# Patient Record
Sex: FEMALE | Race: ASIAN | NOT HISPANIC OR LATINO | URBAN - METROPOLITAN AREA
[De-identification: names, ages, dates, MRNs, and addresses within clinical notes are randomized per-mention and may not be internally consistent; named-entity substitution may affect disease eponyms.]

---

## 2018-01-09 ENCOUNTER — INPATIENT (INPATIENT)
Facility: HOSPITAL | Age: 58
LOS: 10 days | Discharge: HOME | End: 2018-01-20
Attending: HOSPITALIST | Admitting: HOSPITALIST
Payer: COMMERCIAL

## 2018-01-09 VITALS
HEIGHT: 62 IN | SYSTOLIC BLOOD PRESSURE: 148 MMHG | RESPIRATION RATE: 22 BRPM | WEIGHT: 160.06 LBS | HEART RATE: 140 BPM | TEMPERATURE: 102 F | DIASTOLIC BLOOD PRESSURE: 82 MMHG | OXYGEN SATURATION: 93 %

## 2018-01-09 DIAGNOSIS — A41.9 SEPSIS, UNSPECIFIED ORGANISM: ICD-10-CM

## 2018-01-09 DIAGNOSIS — F20.89 OTHER SCHIZOPHRENIA: ICD-10-CM

## 2018-01-09 DIAGNOSIS — R06.89 OTHER ABNORMALITIES OF BREATHING: ICD-10-CM

## 2018-01-09 DIAGNOSIS — R74.8 ABNORMAL LEVELS OF OTHER SERUM ENZYMES: ICD-10-CM

## 2018-01-09 DIAGNOSIS — J96.01 ACUTE RESPIRATORY FAILURE WITH HYPOXIA: ICD-10-CM

## 2018-01-09 LAB
ALBUMIN SERPL ELPH-MCNC: 2.9 G/DL — LOW (ref 3.3–5)
ALP SERPL-CCNC: 88 U/L — SIGNIFICANT CHANGE UP (ref 40–120)
ALT FLD-CCNC: 14 U/L — SIGNIFICANT CHANGE UP (ref 12–78)
ANION GAP SERPL CALC-SCNC: 4 MMOL/L — LOW (ref 5–17)
APPEARANCE UR: CLEAR — SIGNIFICANT CHANGE UP
APTT BLD: 29.2 SEC — SIGNIFICANT CHANGE UP (ref 27.5–37.4)
AST SERPL-CCNC: 26 U/L — SIGNIFICANT CHANGE UP (ref 15–37)
BACTERIA # UR AUTO: ABNORMAL
BASE EXCESS BLDA CALC-SCNC: 1.3 MMOL/L — SIGNIFICANT CHANGE UP (ref -2–2)
BASE EXCESS BLDA CALC-SCNC: 1.4 MMOL/L — SIGNIFICANT CHANGE UP (ref -2–2)
BASE EXCESS BLDA CALC-SCNC: 4.6 MMOL/L — HIGH (ref -2–2)
BASOPHILS # BLD AUTO: 0.1 K/UL — SIGNIFICANT CHANGE UP (ref 0–0.2)
BASOPHILS NFR BLD AUTO: 1.3 % — SIGNIFICANT CHANGE UP (ref 0–2)
BILIRUB SERPL-MCNC: 0.5 MG/DL — SIGNIFICANT CHANGE UP (ref 0.2–1.2)
BILIRUB UR-MCNC: NEGATIVE — SIGNIFICANT CHANGE UP
BLOOD GAS COMMENTS: SIGNIFICANT CHANGE UP
BLOOD GAS SOURCE: SIGNIFICANT CHANGE UP
BUN SERPL-MCNC: 19 MG/DL — SIGNIFICANT CHANGE UP (ref 7–23)
CALCIUM SERPL-MCNC: 7.7 MG/DL — LOW (ref 8.5–10.1)
CHLORIDE SERPL-SCNC: 100 MMOL/L — SIGNIFICANT CHANGE UP (ref 96–108)
CK MB BLD-MCNC: 1.7 % — SIGNIFICANT CHANGE UP (ref 0–3.5)
CK MB CFR SERPL CALC: 3.7 NG/ML — HIGH (ref 0.5–3.6)
CK SERPL-CCNC: 212 U/L — HIGH (ref 26–192)
CO2 SERPL-SCNC: 34 MMOL/L — HIGH (ref 22–31)
COLOR SPEC: YELLOW — SIGNIFICANT CHANGE UP
CREAT SERPL-MCNC: 0.81 MG/DL — SIGNIFICANT CHANGE UP (ref 0.5–1.3)
DIFF PNL FLD: ABNORMAL
EOSINOPHIL # BLD AUTO: 0 K/UL — SIGNIFICANT CHANGE UP (ref 0–0.5)
EOSINOPHIL NFR BLD AUTO: 0 % — SIGNIFICANT CHANGE UP (ref 0–6)
EPI CELLS # UR: SIGNIFICANT CHANGE UP
FLUAV SPEC QL CULT: NEGATIVE — SIGNIFICANT CHANGE UP
FLUBV AG SPEC QL IA: NEGATIVE — SIGNIFICANT CHANGE UP
GLUCOSE SERPL-MCNC: 173 MG/DL — HIGH (ref 70–99)
GLUCOSE UR QL: NEGATIVE MG/DL — SIGNIFICANT CHANGE UP
HCO3 BLDA-SCNC: 27 MMOL/L — SIGNIFICANT CHANGE UP (ref 21–29)
HCO3 BLDA-SCNC: 30 MMOL/L — HIGH (ref 21–29)
HCO3 BLDA-SCNC: 33 MMOL/L — HIGH (ref 21–29)
HCT VFR BLD CALC: 37.1 % — SIGNIFICANT CHANGE UP (ref 34.5–45)
HGB BLD-MCNC: 11.6 G/DL — SIGNIFICANT CHANGE UP (ref 11.5–15.5)
HOROWITZ INDEX BLDA+IHG-RTO: 35 — SIGNIFICANT CHANGE UP
HOROWITZ INDEX BLDA+IHG-RTO: 40 — SIGNIFICANT CHANGE UP
HOROWITZ INDEX BLDA+IHG-RTO: 40 — SIGNIFICANT CHANGE UP
INR BLD: 1.3 RATIO — HIGH (ref 0.88–1.16)
KETONES UR-MCNC: NEGATIVE — SIGNIFICANT CHANGE UP
LACTATE SERPL-SCNC: 1.6 MMOL/L — SIGNIFICANT CHANGE UP (ref 0.7–2)
LEUKOCYTE ESTERASE UR-ACNC: NEGATIVE — SIGNIFICANT CHANGE UP
LYMPHOCYTES # BLD AUTO: 1 K/UL — SIGNIFICANT CHANGE UP (ref 1–3.3)
LYMPHOCYTES # BLD AUTO: 11.3 % — LOW (ref 13–44)
MCHC RBC-ENTMCNC: 29.9 PG — SIGNIFICANT CHANGE UP (ref 27–34)
MCHC RBC-ENTMCNC: 31.4 GM/DL — LOW (ref 32–36)
MCV RBC AUTO: 95.4 FL — SIGNIFICANT CHANGE UP (ref 80–100)
MONOCYTES # BLD AUTO: 0.7 K/UL — SIGNIFICANT CHANGE UP (ref 0–0.9)
MONOCYTES NFR BLD AUTO: 7.7 % — SIGNIFICANT CHANGE UP (ref 2–14)
NEUTROPHILS # BLD AUTO: 7.1 K/UL — SIGNIFICANT CHANGE UP (ref 1.8–7.4)
NEUTROPHILS NFR BLD AUTO: 79.6 % — HIGH (ref 43–77)
NITRITE UR-MCNC: NEGATIVE — SIGNIFICANT CHANGE UP
NT-PROBNP SERPL-SCNC: 2330 PG/ML — HIGH (ref 0–125)
PCO2 BLDA: 49 MMHG — HIGH (ref 32–46)
PCO2 BLDA: 74 MMHG — CRITICAL HIGH (ref 32–46)
PCO2 BLDA: 79 MMHG — CRITICAL HIGH (ref 32–46)
PH BLD: 7.23 — LOW (ref 7.35–7.45)
PH BLD: 7.25 — LOW (ref 7.35–7.45)
PH BLD: 7.36 — SIGNIFICANT CHANGE UP (ref 7.35–7.45)
PH UR: 6 — SIGNIFICANT CHANGE UP (ref 5–8)
PLATELET # BLD AUTO: 152 K/UL — SIGNIFICANT CHANGE UP (ref 150–400)
PO2 BLDA: 110 MMHG — HIGH (ref 74–108)
PO2 BLDA: 146 MMHG — HIGH (ref 74–108)
PO2 BLDA: 164 MMHG — HIGH (ref 74–108)
POTASSIUM SERPL-MCNC: 3.5 MMOL/L — SIGNIFICANT CHANGE UP (ref 3.5–5.3)
POTASSIUM SERPL-SCNC: 3.5 MMOL/L — SIGNIFICANT CHANGE UP (ref 3.5–5.3)
PROT SERPL-MCNC: 7.7 GM/DL — SIGNIFICANT CHANGE UP (ref 6–8.3)
PROT UR-MCNC: 30 MG/DL
PROTHROM AB SERPL-ACNC: 14.3 SEC — HIGH (ref 9.8–12.7)
RBC # BLD: 3.89 M/UL — SIGNIFICANT CHANGE UP (ref 3.8–5.2)
RBC # FLD: 14.6 % — SIGNIFICANT CHANGE UP (ref 11–15)
RBC CASTS # UR COMP ASSIST: ABNORMAL /HPF (ref 0–4)
SAO2 % BLDA: 95 % — SIGNIFICANT CHANGE UP (ref 92–96)
SAO2 % BLDA: 97 % — HIGH (ref 92–96)
SAO2 % BLDA: 98 % — HIGH (ref 92–96)
SODIUM SERPL-SCNC: 138 MMOL/L — SIGNIFICANT CHANGE UP (ref 135–145)
SP GR SPEC: 1.02 — SIGNIFICANT CHANGE UP (ref 1.01–1.02)
TROPONIN I SERPL-MCNC: 1.29 NG/ML — HIGH (ref 0.01–0.04)
TROPONIN I SERPL-MCNC: 1.5 NG/ML — HIGH (ref 0.01–0.04)
UROBILINOGEN FLD QL: NEGATIVE MG/DL — SIGNIFICANT CHANGE UP
WBC # BLD: 9 K/UL — SIGNIFICANT CHANGE UP (ref 3.8–10.5)
WBC # FLD AUTO: 9 K/UL — SIGNIFICANT CHANGE UP (ref 3.8–10.5)
WBC UR QL: SIGNIFICANT CHANGE UP

## 2018-01-09 PROCEDURE — 71250 CT THORAX DX C-: CPT | Mod: 26

## 2018-01-09 PROCEDURE — 71045 X-RAY EXAM CHEST 1 VIEW: CPT | Mod: 26

## 2018-01-09 PROCEDURE — 99285 EMERGENCY DEPT VISIT HI MDM: CPT

## 2018-01-09 PROCEDURE — 99223 1ST HOSP IP/OBS HIGH 75: CPT

## 2018-01-09 PROCEDURE — 93010 ELECTROCARDIOGRAM REPORT: CPT

## 2018-01-09 RX ORDER — ARIPIPRAZOLE 15 MG/1
10 TABLET ORAL
Qty: 0 | Refills: 0 | Status: DISCONTINUED | OUTPATIENT
Start: 2018-01-09 | End: 2018-01-20

## 2018-01-09 RX ORDER — ARIPIPRAZOLE 15 MG/1
10 TABLET ORAL
Qty: 0 | Refills: 0 | Status: DISCONTINUED | OUTPATIENT
Start: 2018-01-09 | End: 2018-01-09

## 2018-01-09 RX ORDER — SERTRALINE 25 MG/1
100 TABLET, FILM COATED ORAL
Qty: 0 | Refills: 0 | Status: DISCONTINUED | OUTPATIENT
Start: 2018-01-09 | End: 2018-01-20

## 2018-01-09 RX ORDER — IPRATROPIUM/ALBUTEROL SULFATE 18-103MCG
3 AEROSOL WITH ADAPTER (GRAM) INHALATION
Qty: 0 | Refills: 0 | Status: COMPLETED | OUTPATIENT
Start: 2018-01-09 | End: 2018-01-09

## 2018-01-09 RX ORDER — ENOXAPARIN SODIUM 100 MG/ML
40 INJECTION SUBCUTANEOUS DAILY
Qty: 0 | Refills: 0 | Status: DISCONTINUED | OUTPATIENT
Start: 2018-01-09 | End: 2018-01-20

## 2018-01-09 RX ORDER — ACETAMINOPHEN 500 MG
975 TABLET ORAL ONCE
Qty: 0 | Refills: 0 | Status: COMPLETED | OUTPATIENT
Start: 2018-01-09 | End: 2018-01-09

## 2018-01-09 RX ORDER — PIPERACILLIN AND TAZOBACTAM 4; .5 G/20ML; G/20ML
3.38 INJECTION, POWDER, LYOPHILIZED, FOR SOLUTION INTRAVENOUS EVERY 8 HOURS
Qty: 0 | Refills: 0 | Status: DISCONTINUED | OUTPATIENT
Start: 2018-01-09 | End: 2018-01-13

## 2018-01-09 RX ORDER — DIVALPROEX SODIUM 500 MG/1
500 TABLET, DELAYED RELEASE ORAL
Qty: 0 | Refills: 0 | Status: DISCONTINUED | OUTPATIENT
Start: 2018-01-09 | End: 2018-01-20

## 2018-01-09 RX ORDER — VANCOMYCIN HCL 1 G
1000 VIAL (EA) INTRAVENOUS EVERY 12 HOURS
Qty: 0 | Refills: 0 | Status: DISCONTINUED | OUTPATIENT
Start: 2018-01-09 | End: 2018-01-11

## 2018-01-09 RX ORDER — ASPIRIN/CALCIUM CARB/MAGNESIUM 324 MG
325 TABLET ORAL ONCE
Qty: 0 | Refills: 0 | Status: COMPLETED | OUTPATIENT
Start: 2018-01-09 | End: 2018-01-09

## 2018-01-09 RX ORDER — SODIUM CHLORIDE 9 MG/ML
3 INJECTION INTRAMUSCULAR; INTRAVENOUS; SUBCUTANEOUS ONCE
Qty: 0 | Refills: 0 | Status: COMPLETED | OUTPATIENT
Start: 2018-01-09 | End: 2018-01-09

## 2018-01-09 RX ORDER — AZITHROMYCIN 500 MG/1
TABLET, FILM COATED ORAL
Qty: 0 | Refills: 0 | Status: DISCONTINUED | OUTPATIENT
Start: 2018-01-09 | End: 2018-01-11

## 2018-01-09 RX ORDER — SODIUM CHLORIDE 9 MG/ML
3000 INJECTION INTRAMUSCULAR; INTRAVENOUS; SUBCUTANEOUS ONCE
Qty: 0 | Refills: 0 | Status: COMPLETED | OUTPATIENT
Start: 2018-01-09 | End: 2018-01-09

## 2018-01-09 RX ORDER — VANCOMYCIN HCL 1 G
1000 VIAL (EA) INTRAVENOUS ONCE
Qty: 0 | Refills: 0 | Status: COMPLETED | OUTPATIENT
Start: 2018-01-09 | End: 2018-01-09

## 2018-01-09 RX ORDER — PIPERACILLIN AND TAZOBACTAM 4; .5 G/20ML; G/20ML
3.38 INJECTION, POWDER, LYOPHILIZED, FOR SOLUTION INTRAVENOUS ONCE
Qty: 0 | Refills: 0 | Status: COMPLETED | OUTPATIENT
Start: 2018-01-09 | End: 2018-01-09

## 2018-01-09 RX ORDER — BENZTROPINE MESYLATE 1 MG
2 TABLET ORAL
Qty: 0 | Refills: 0 | Status: DISCONTINUED | OUTPATIENT
Start: 2018-01-09 | End: 2018-01-20

## 2018-01-09 RX ORDER — ASPIRIN/CALCIUM CARB/MAGNESIUM 324 MG
81 TABLET ORAL DAILY
Qty: 0 | Refills: 0 | Status: DISCONTINUED | OUTPATIENT
Start: 2018-01-09 | End: 2018-01-20

## 2018-01-09 RX ORDER — AZITHROMYCIN 500 MG/1
500 TABLET, FILM COATED ORAL ONCE
Qty: 0 | Refills: 0 | Status: COMPLETED | OUTPATIENT
Start: 2018-01-09 | End: 2018-01-09

## 2018-01-09 RX ORDER — AZITHROMYCIN 500 MG/1
500 TABLET, FILM COATED ORAL EVERY 24 HOURS
Qty: 0 | Refills: 0 | Status: DISCONTINUED | OUTPATIENT
Start: 2018-01-10 | End: 2018-01-11

## 2018-01-09 RX ADMIN — SERTRALINE 100 MILLIGRAM(S): 25 TABLET, FILM COATED ORAL at 17:32

## 2018-01-09 RX ADMIN — ARIPIPRAZOLE 10 MILLIGRAM(S): 15 TABLET ORAL at 17:31

## 2018-01-09 RX ADMIN — DIVALPROEX SODIUM 500 MILLIGRAM(S): 500 TABLET, DELAYED RELEASE ORAL at 17:32

## 2018-01-09 RX ADMIN — PIPERACILLIN AND TAZOBACTAM 200 GRAM(S): 4; .5 INJECTION, POWDER, LYOPHILIZED, FOR SOLUTION INTRAVENOUS at 12:24

## 2018-01-09 RX ADMIN — Medication 3 MILLILITER(S): at 12:00

## 2018-01-09 RX ADMIN — SODIUM CHLORIDE 3 MILLILITER(S): 9 INJECTION INTRAMUSCULAR; INTRAVENOUS; SUBCUTANEOUS at 12:16

## 2018-01-09 RX ADMIN — Medication 250 MILLIGRAM(S): at 13:01

## 2018-01-09 RX ADMIN — Medication 975 MILLIGRAM(S): at 12:13

## 2018-01-09 RX ADMIN — Medication 975 MILLIGRAM(S): at 12:56

## 2018-01-09 RX ADMIN — Medication 3 MILLILITER(S): at 11:45

## 2018-01-09 RX ADMIN — Medication 3 MILLILITER(S): at 12:14

## 2018-01-09 RX ADMIN — SODIUM CHLORIDE 3000 MILLILITER(S): 9 INJECTION INTRAMUSCULAR; INTRAVENOUS; SUBCUTANEOUS at 11:45

## 2018-01-09 RX ADMIN — Medication 325 MILLIGRAM(S): at 13:01

## 2018-01-09 RX ADMIN — Medication 2 MILLIGRAM(S): at 17:32

## 2018-01-09 RX ADMIN — PIPERACILLIN AND TAZOBACTAM 25 GRAM(S): 4; .5 INJECTION, POWDER, LYOPHILIZED, FOR SOLUTION INTRAVENOUS at 22:36

## 2018-01-09 RX ADMIN — AZITHROMYCIN 255 MILLIGRAM(S): 500 TABLET, FILM COATED ORAL at 17:12

## 2018-01-09 NOTE — ED ADULT NURSE NOTE - OBJECTIVE STATEMENT
received pt to bed 18 alert and oriented times 4. complaining of persistent cough and fever. code sepsis initiated. labs obtained and sent. fluids started. pt medicated for fever. placed on monitor. respiratory treatment started.

## 2018-01-09 NOTE — H&P ADULT - HISTORY OF PRESENT ILLNESS
56yo female with pmh schizophrenia, h/o chronic lung infection, h/o TB and untreated for a long time with rt lung collapse, presents with 10 days of not feeling well, worsening of chronic cough and sob. Pt at baseline mental status.  Pt lives with sister.  Per family, pt also with chronic diarrhea for years, unchanged. Pt baseline hypoxic.     	ROS: + fever, no chills. No photophobia/eye pain/changes in vision, No ear pain/sore throat/dysphagia, No chest pain/palpitations. + SOB/cough, no stridor. No abdominal pain, N/V/D, no black/bloody bm. No dysuria/frequency/discharge, No headache. No Dizziness.  No rash.  No numbness/tingling/weakness.

## 2018-01-09 NOTE — CONSULT NOTE ADULT - ATTENDING COMMENTS
57 yoF hx schizophrenia, hx TB (chronic R lung collapse/bronchiectasis) admitted with PNA and acute/chronic hypercapnic resp failure. Pt awake and following commands. Is on bipap and now reports that her breathing is significantly improved. Would cont abx for PNA. Cont BD tx. Check sputum cx and bcx

## 2018-01-09 NOTE — H&P ADULT - NSHPREVIEWOFSYSTEMS_GEN_ALL_CORE
CONSTITUTIONAL: POSITIVE fever, no weight loss, or fatigue  EYES: No eye pain, visual disturbances, or discharge  ENMT:  No difficulty hearing, tinnitus, vertigo; No sinus or throat pain  NECK: No pain or stiffness  RESPIRATORY: see history of present illness   CARDIOVASCULAR: No chest pain, palpitations, dizziness, or leg swelling  GASTROINTESTINAL: No abdominal or epigastric pain. No nausea, vomiting, or hematemesis; No diarrhea or constipation. No melena or hematochezia.  GENITOURINARY: No dysuria, frequency, hematuria, or incontinence  NEUROLOGICAL: No headaches, memory loss, loss of strength, numbness, or tremors  SKIN: No itching, burning, rashes, or lesions   LYMPH NODES: No enlarged glands  ENDOCRINE: No heat or cold intolerance; No hair loss  MUSCULOSKELETAL: No joint pain or swelling; No muscle, back, or extremity pain  PSYCHIATRIC: No depression, anxiety, mood swings, or difficulty sleeping  HEME/LYMPH: No easy bruising, or bleeding gums  ALLERGY AND IMMUNOLOGIC: No hives or eczema

## 2018-01-09 NOTE — CONSULT NOTE ADULT - SUBJECTIVE AND OBJECTIVE BOX
Patient is a 57y old  Female who presents with a chief complaint of short of breath (09 Jan 2018 16:16)      HPI:  56yo female with pmh schizophrenia, h/o chronic lung infection, h/o TB and untreated for a long time with rt lung collapse, presents with 10 days of not feeling well, worsening of chronic cough and sob. Pt at baseline mental status.  Pt lives with sister.  Per family, pt also with chronic diarrhea for years, unchanged. Pt baseline hypoxic.     	ROS: + fever, no chills. No photophobia/eye pain/changes in vision, No ear pain/sore throat/dysphagia, No chest pain/palpitations. + SOB/cough, no stridor. No abdominal pain, N/V/D, no black/bloody bm. No dysuria/frequency/discharge, No headache. No Dizziness.  No rash.  No numbness/tingling/weakness. (09 Jan 2018 16:16)    consultation called for abnormal ABG despite bipap use.  Pt has been complaining of worsening SOB for past week with increased sputum production.  Pt also c/o weight loss for past few months and chronic diarrhea.  Pt mentating well and awake/alert      Allergies    No Known Allergies    Intolerances        MEDICATIONS  (STANDING):  ARIPiprazole 10 milliGRAM(s) Oral two times a day  aspirin enteric coated 81 milliGRAM(s) Oral daily  azithromycin  IVPB      benztropine 2 milliGRAM(s) Oral two times a day  diVALproex  milliGRAM(s) Oral two times a day  enoxaparin Injectable 40 milliGRAM(s) SubCutaneous daily  piperacillin/tazobactam IVPB. 3.375 Gram(s) IV Intermittent every 8 hours  sertraline 100 milliGRAM(s) Oral two times a day  vancomycin  IVPB 1000 milliGRAM(s) IV Intermittent every 12 hours    MEDICATIONS  (PRN):      Daily Height in cm: 157.48 (09 Jan 2018 11:34)    Daily     Drug Dosing Weight  Height (cm): 157.48 (09 Jan 2018 11:34)  Weight (kg): 72.6 (09 Jan 2018 11:34)  BMI (kg/m2): 29.3 (09 Jan 2018 11:34)  BSA (m2): 1.74 (09 Jan 2018 11:34)    PAST MEDICAL & SURGICAL HISTORY:  Schizophrenia  Tuberculosis  Lung disease  Autism  Hypoxia  No significant past surgical history      FAMILY HISTORY:  No pertinent family history in first degree relatives      SOCIAL HISTORY:    ADVANCE DIRECTIVES:    REVIEW OF SYSTEMS:    CONSTITUTIONAL: No fever, ++weight loss, or fatigue  RESPIRATORY: +cough,+heezing, ++shortness of breath  CARDIOVASCULAR: No chest pain,  or leg swelling  GASTROINTESTINAL: No abdominal or epigastric pain. No nausea, vomiting,++diarrhea GENITOURINARY: No dysuria, frequency, hematuria, or incontinence       Vital Signs Last 24 Hrs  T(C): 37.2 (09 Jan 2018 13:21), Max: 39.1 (09 Jan 2018 11:34)  T(F): 99 (09 Jan 2018 13:21), Max: 102.3 (09 Jan 2018 11:34)  HR: 67 (09 Jan 2018 20:47) (67 - 140)  BP: 98/61 (09 Jan 2018 20:47) (93/53 - 148/82)  BP(mean): --  ABP: --  ABP(mean): --  RR: 18 (09 Jan 2018 20:37) (18 - 22)  SpO2: 100% (09 Jan 2018 20:37) (93% - 100%)      ABG - ( 09 Jan 2018 20:27 )  pH: x     /  pCO2: 49    /  pO2: 146   / HCO3: 27    / Base Excess: 1.4   /  SaO2: 98                  I&O's Detail      PHYSICAL EXAM:    GENERAL: NAD, well-groomed, well-developed  HEAD:  Atraumatic, Normocephalic  EYES: conjunctiva and sclera clear  NECK: Supple,   NERVOUS SYSTEM:  Alert & Oriented X3, Good concentration; Motor Strength 5/5 B/L upper and lower extremities; DTRs 2+ intact and symmetric  CHEST/LUNG: decreased BS right base and bilat rhonchi  HEART: Regular rate and rhythm; No murmurs,  ABDOMEN: Soft, Nontender, Nondistended; Bowel sounds present  EXTREMITIES:  No clubbing, cyanosis, or edema    LABS:  CBC Full  -  ( 09 Jan 2018 12:12 )  WBC Count : 9.0 K/uL  Hemoglobin : 11.6 g/dL  Hematocrit : 37.1 %  Platelet Count - Automated : 152 K/uL  Mean Cell Volume : 95.4 fl  Mean Cell Hemoglobin : 29.9 pg  Mean Cell Hemoglobin Concentration : 31.4 gm/dL  Auto Neutrophil # : 7.1 K/uL  Auto Lymphocyte # : 1.0 K/uL  Auto Monocyte # : 0.7 K/uL  Auto Eosinophil # : 0.0 K/uL  Auto Basophil # : 0.1 K/uL  Auto Neutrophil % : 79.6 %  Auto Lymphocyte % : 11.3 %  Auto Monocyte % : 7.7 %  Auto Eosinophil % : 0.0 %  Auto Basophil % : 1.3 %    01-09    138  |  100  |  19  ----------------------------<  173<H>  3.5   |  34<H>  |  0.81    Ca    7.7<L>      09 Jan 2018 12:12    TPro  7.7  /  Alb  2.9<L>  /  TBili  0.5  /  DBili  x   /  AST  26  /  ALT  14  /  AlkPhos  88  01-09    CAPILLARY BLOOD GLUCOSE        PT/INR - ( 09 Jan 2018 12:12 )   PT: 14.3 sec;   INR: 1.30 ratio         PTT - ( 09 Jan 2018 12:12 )  PTT:29.2 sec    CARDIAC MARKERS ( 09 Jan 2018 12:12 )  1.500 ng/mL / x     / 212 U/L / x     / 3.7 ng/mL Patient is a 57y old  Female who presents with a chief complaint of short of breath (09 Jan 2018 16:16)      HPI:  58yo female with pmh schizophrenia, h/o chronic lung infection, h/o TB and untreated for a long time with rt lung collapse, presents with 10 days of not feeling well, worsening of chronic cough and sob. Pt at baseline mental status.  Pt lives with sister.  Per family, pt also with chronic diarrhea for years, unchanged. Pt baseline hypoxic.  (09 Jan 2018 16:16)    consultation called for abnormal ABG despite bipap use.  Pt has been complaining of worsening SOB for past week with increased sputum production.  Pt also c/o weight loss for past few months and chronic diarrhea.  Pt mentating well and awake/alert      Allergies    No Known Allergies    Intolerances        MEDICATIONS  (STANDING):  ARIPiprazole 10 milliGRAM(s) Oral two times a day  aspirin enteric coated 81 milliGRAM(s) Oral daily  azithromycin  IVPB      benztropine 2 milliGRAM(s) Oral two times a day  diVALproex  milliGRAM(s) Oral two times a day  enoxaparin Injectable 40 milliGRAM(s) SubCutaneous daily  piperacillin/tazobactam IVPB. 3.375 Gram(s) IV Intermittent every 8 hours  sertraline 100 milliGRAM(s) Oral two times a day  vancomycin  IVPB 1000 milliGRAM(s) IV Intermittent every 12 hours    MEDICATIONS  (PRN):      Daily Height in cm: 157.48 (09 Jan 2018 11:34)    Daily     Drug Dosing Weight  Height (cm): 157.48 (09 Jan 2018 11:34)  Weight (kg): 72.6 (09 Jan 2018 11:34)  BMI (kg/m2): 29.3 (09 Jan 2018 11:34)  BSA (m2): 1.74 (09 Jan 2018 11:34)    PAST MEDICAL & SURGICAL HISTORY:  Schizophrenia  Tuberculosis  Lung disease  Autism  Hypoxia  No significant past surgical history      FAMILY HISTORY:  No pertinent family history in first degree relatives      SOCIAL HISTORY:    ADVANCE DIRECTIVES: FULL code    REVIEW OF SYSTEMS:    CONSTITUTIONAL: No fever, ++weight loss, or fatigue  RESPIRATORY: +cough,+heezing, ++shortness of breath  CARDIOVASCULAR: No chest pain,  or leg swelling  GASTROINTESTINAL: No abdominal or epigastric pain. No nausea, vomiting,++diarrhea GENITOURINARY: No dysuria, frequency, hematuria, or incontinence       Vital Signs Last 24 Hrs  T(C): 37.2 (09 Jan 2018 13:21), Max: 39.1 (09 Jan 2018 11:34)  T(F): 99 (09 Jan 2018 13:21), Max: 102.3 (09 Jan 2018 11:34)  HR: 67 (09 Jan 2018 20:47) (67 - 140)  BP: 98/61 (09 Jan 2018 20:47) (93/53 - 148/82)  BP(mean): --  ABP: --  ABP(mean): --  RR: 18 (09 Jan 2018 20:37) (18 - 22)  SpO2: 100% (09 Jan 2018 20:37) (93% - 100%)      ABG - ( 09 Jan 2018 20:27 )  pH: x     /  pCO2: 49    /  pO2: 146   / HCO3: 27    / Base Excess: 1.4   /  SaO2: 98                  I&O's Detail      PHYSICAL EXAM:    GENERAL: NAD, well-groomed, well-developed  HEAD:  Atraumatic, Normocephalic  EYES: conjunctiva and sclera clear  NECK: Supple,   NERVOUS SYSTEM:  Alert & Oriented X3, Good concentration; Motor Strength 5/5 B/L upper and lower extremities; DTRs 2+ intact and symmetric  CHEST/LUNG: decreased BS right base and bilat rhonchi  HEART: Regular rate and rhythm; No murmurs,  ABDOMEN: Soft, Nontender, Nondistended; Bowel sounds present  EXTREMITIES:  No clubbing, cyanosis, or edema    LABS:  CBC Full  -  ( 09 Jan 2018 12:12 )  WBC Count : 9.0 K/uL  Hemoglobin : 11.6 g/dL  Hematocrit : 37.1 %  Platelet Count - Automated : 152 K/uL  Mean Cell Volume : 95.4 fl  Mean Cell Hemoglobin : 29.9 pg  Mean Cell Hemoglobin Concentration : 31.4 gm/dL  Auto Neutrophil # : 7.1 K/uL  Auto Lymphocyte # : 1.0 K/uL  Auto Monocyte # : 0.7 K/uL  Auto Eosinophil # : 0.0 K/uL  Auto Basophil # : 0.1 K/uL  Auto Neutrophil % : 79.6 %  Auto Lymphocyte % : 11.3 %  Auto Monocyte % : 7.7 %  Auto Eosinophil % : 0.0 %  Auto Basophil % : 1.3 %    01-09    138  |  100  |  19  ----------------------------<  173<H>  3.5   |  34<H>  |  0.81    Ca    7.7<L>      09 Jan 2018 12:12    TPro  7.7  /  Alb  2.9<L>  /  TBili  0.5  /  DBili  x   /  AST  26  /  ALT  14  /  AlkPhos  88  01-09    CAPILLARY BLOOD GLUCOSE        PT/INR - ( 09 Jan 2018 12:12 )   PT: 14.3 sec;   INR: 1.30 ratio         PTT - ( 09 Jan 2018 12:12 )  PTT:29.2 sec    CARDIAC MARKERS ( 09 Jan 2018 12:12 )  1.500 ng/mL / x     / 212 U/L / x     / 3.7 ng/mL

## 2018-01-09 NOTE — ED PROVIDER NOTE - MEDICAL DECISION MAKING DETAILS
Pt hypoxic, but unclear baseline as likely normally hypoxic given history (pt discharged with pulse ox of 88%). Pt also though noted to be hypercpneic but pt is awake and well appearing. Bipap placed. Abx ordered. CT done. PT with elev trop in setting of normal EKG and low CK, though symptoms have been ongoing x 10 days, will need to be trended. Pt hypoxic, but unclear baseline as likely normally hypoxic given history (pt discharged with pulse ox of 88%). Pt also though noted to be hypercpneic but pt is awake and well appearing. Bipap placed. Abx ordered. CT done. PT with elev trop in setting of normal EKG and low CK, though symptoms have been ongoing x 10 days, will need to be trended. ICU consulted, not for ICU at this time. d/w dr. joseph for admission.

## 2018-01-09 NOTE — H&P ADULT - NSHPPHYSICALEXAM_GEN_ALL_CORE
GENERAL:  well-developed mild respiratory distress on bipap   HEAD:  Atraumatic, Normocephalic  EYES: EOMI, PERRLA, conjunctiva and sclera clear  ENMT: No tonsillar erythema, exudates, or enlargement; Moist mucous membranes, Good dentition, No lesions  NECK: Supple, No JVD, Normal thyroid  NERVOUS SYSTEM:  Alert & Oriented X3, Good concentration; Motor Strength 5/5 B/L upper and lower extremities; DTRs 2+ intact and symmetric  CHEST/LUNG: bronchial breath sounds on right -coarse sounds on left   HEART: Regular rate and rhythm; No murmurs, rubs, or gallops  ABDOMEN: Soft, Nontender, Nondistended; Bowel sounds present  EXTREMITIES:  2+ Peripheral Pulses, No clubbing, cyanosis, or edema  LYMPH: No lymphadenopathy   SKIN: No rashes or lesions

## 2018-01-09 NOTE — CONSULT NOTE ADULT - ASSESSMENT
56yo female with pmh schizophrenia, h/o chronic lung infection, h/o TB and untreated for a long time with rt lung collapse, presents with 10 days of not feeling well, worsening of chronic cough and sob.  Pt does not need ICU admission at this time.  She is hemodynamically stable and mentating.  She is protecting her airway.

## 2018-01-09 NOTE — CONSULT NOTE ADULT - SUBJECTIVE AND OBJECTIVE BOX
CHIEF COMPLAINT:  Patient is a 57y old  Female who presents with a chief complaint of short of breath (2018 16:16)      HPI:  56yo female with pmh schizophrenia, h/o chronic lung infection, h/o TB and untreated for a long time with rt lung collapse, presents with 10 days of not feeling well, worsening of chronic cough and sob. Pt at baseline mental status.  Pt lives with sister.  Per family, pt also with chronic diarrhea for years, unchanged. Pt baseline hypoxic.     ROS: + fever, no chills. No photophobia/eye pain/changes in vision, No ear pain/sore throat/dysphagia, No chest pain/palpitations. + SOB/cough, no stridor. No abdominal pain, N/V/D, no black/bloody bm. No dysuria/frequency/discharge, No headache. No Dizziness.  No rash.  No numbness/tingling/weakness. (2018 16:16)    ALLERGIES:  No Known Allergies    Home Medications:  ARIPiprazole 10 mg oral tablet, disintegratin tab(s) orally once a day (2018 12:23)  benztropine 2 mg oral tablet: 1 tab(s) orally 2 times a day (2018 12:23)  divalproex sodium 500 mg oral delayed release tablet: 1 tab(s) orally 2 times a day (2018 12:23)  sertraline 100 mg oral tablet: 1 tab(s) orally once a day (2018 12:23)    PAST MEDICAL & SURGICAL HISTORY:  Schizophrenia  Tuberculosis  Lung disease  Autism  Hypoxia  No significant past surgical history      FAMILY HISTORY:  No pertinent family history in first degree relatives      SOCIAL HISTORY:  No tobacco reported.    REVIEW OF SYSTEMS:  General:  No wt loss, fevers, chills, night sweats; positive fever  Eyes:  Good vision, no reported pain  ENT:  No sore throat, pain, runny nose, dysphagia  CV:  No pain, palpitations, hypo/hypertension  Resp:  No tachypnea, wheezing; positive SOB/cough  GI:  No pain, nausea, vomiting, diarrhea, constipation  :  No pain, bleeding, incontinence, nocturia  Muscle:  No pain, weakness  Breast:  No pain, abscess, mass, discharge  Neuro:  No weakness, tingling, memory problems  Psych:  No fatigue, insomnia, mood problems, depression  Endocrine:  No polyuria, polydipsia, cold/heat intolerance  Heme:  No petechiae, ecchymosis, easy bruisability  Skin:  No rash, tattoos, scars, edema    PHYSICAL EXAM:  Vital Signs:  Vital Signs Last 24 Hrs  T(C): 37.2 (2018 13:21), Max: 39.1 (2018 11:34)  T(F): 99 (2018 13:21), Max: 102.3 (2018 11:34)  HR: 96 (2018 13:21) (96 - 140)  BP: 105/54 (2018 13:21) (105/54 - 148/82)  RR: 20 (2018 13:21) (20 - 22)  SpO2: 100% (2018 12:52) (93% - 100%)  I&O's Summary    General:  Appears stated age, well-groomed, well-nourished, no distress  HEENT:  NC/AT, patent nares w/ pink mucosa, OP clear w/o lesions, conjunctivae clear, no thyromegaly, nodules, adenopathy, no JVD  Chest:  Full & symmetric excursion, no increased effort, breath sounds clear  Cardiovascular:  Regular rhythm, S1, S2, no murmur/rub/S3/S4, radial/pedal pulses 2+  Abdomen:  Soft, non-tender, non-distended, normoactive bowel sounds  Extremities: no edema  Skin:  No rash/erythema. Skin is warm/dry  Musculoskeletal:  N/A  Neuro/Psych:  N/A    LABORATORY:                          11.6   9.0   )-----------( 152      ( 2018 12:12 )             37.1         138  |  100  |  19  ----------------------------<  173<H>  3.5   |  34<H>  |  0.81    Ca    7.7<L>      2018 12:12    TPro  7.7  /  Alb  2.9<L>  /  TBili  0.5  /  DBili  x   /  AST  26  /  ALT  14  /  AlkPhos  88      ABG - ( 2018 14:41 )  pH: x     /  pCO2: 74    /  pO2: 110   / HCO3: 30    / Base Excess: 1.3   /  SaO2: 95            CARDIAC MARKERS ( 2018 12:12 )  1.500 ng/mL / x     / 212 U/L / x     / 3.7 ng/mL      LIVER FUNCTIONS - ( 2018 12:12 )  Alb: 2.9 g/dL / Pro: 7.7 gm/dL / ALK PHOS: 88 U/L / ALT: 14 U/L / AST: 26 U/L / GGT: x           PT/INR - ( 2018 12:12 )   PT: 14.3 sec;   INR: 1.30 ratio         PTT - ( 2018 12:12 )  PTT:29.2 sec    BNPSerum Pro-Brain Natriuretic Peptide: 2330 pg/mL (18 @ 12:12)      IMAGING:  ecg:    < from: Xray Chest 1 View AP/PA. (18 @ 12:18) >  Chronic abnormalities of the right hemithorax, grossly unchanged. Left   lung is clear. CT imaging of the chest recommended.    < end of copied text >    < from: CT Chest No Cont (18 @ 13:40) >  Groundglass and small nodular opacities throughout the left lung, likely   infectious in etiology.    Chronic volume loss of the right lung with cystic bronchiectasis.    < end of copied text >    ASSESSMENT:  56yo female with pmh schizophrenia, h/o chronic lung infection, h/o TB and untreated for a long time with rt lung collapse, presents with 10 days of not feeling well, worsening of chronic cough and sob. Pt at baseline mental status.  Pt lives with sister.  Per family, pt also with chronic diarrhea for years, unchanged. Pt baseline hypoxic.     ROS: + fever, no chills. No photophobia/eye pain/changes in vision, No ear pain/sore throat/dysphagia, No chest pain/palpitations. + SOB/cough, no stridor. No abdominal pain, N/V/D, no black/bloody bm. No dysuria/frequency/discharge, No headache. No Dizziness.  No rash.  No numbness/tingling/weakness.      PLAN:    Bipap/oxygen.    MEDICATIONS  (STANDING):  ARIPiprazole 10 milliGRAM(s) Oral two times a day  aspirin enteric coated 81 milliGRAM(s) Oral daily  azithromycin  IVPB 500 milliGRAM(s) IV Intermittent once  benztropine 2 milliGRAM(s) Oral two times a day  diVALproex  milliGRAM(s) Oral two times a day  enoxaparin Injectable 40 milliGRAM(s) SubCutaneous daily  piperacillin/tazobactam IVPB. 3.375 Gram(s) IV Intermittent every 8 hours  sertraline 100 milliGRAM(s) Oral two times a day  vancomycin  IVPB 1000 milliGRAM(s) IV Intermittent every 12 hours    F/u troponin trend.  Check Echo.  Supportive care.    Abdi Kelly MD, FACC, NATALYA ROSALES, FACP  Director, Heart Failure Services  [facility]  , Department of Cardiology  [facility] CHIEF COMPLAINT:  Patient is a 57y old  Female who presents with a chief complaint of short of breath (2018 16:16)      HPI:  56yo female with pmh schizophrenia, h/o chronic lung infection, h/o TB and untreated for a long time with rt lung collapse, presents with 10 days of not feeling well, worsening of chronic cough and sob. Pt at baseline mental status.  Pt lives with sister.  Per family, pt also with chronic diarrhea for years, unchanged. Pt baseline hypoxic.     ROS: + fever, no chills. No photophobia/eye pain/changes in vision, No ear pain/sore throat/dysphagia, No chest pain/palpitations. + SOB/cough, no stridor. No abdominal pain, N/V/D, no black/bloody bm. No dysuria/frequency/discharge, No headache. No Dizziness.  No rash.  No numbness/tingling/weakness. (2018 16:16)    ALLERGIES:  No Known Allergies    Home Medications:  ARIPiprazole 10 mg oral tablet, disintegratin tab(s) orally once a day (2018 12:23)  benztropine 2 mg oral tablet: 1 tab(s) orally 2 times a day (2018 12:23)  divalproex sodium 500 mg oral delayed release tablet: 1 tab(s) orally 2 times a day (2018 12:23)  sertraline 100 mg oral tablet: 1 tab(s) orally once a day (2018 12:23)    PAST MEDICAL & SURGICAL HISTORY:  Schizophrenia  Tuberculosis  Lung disease  Autism  Hypoxia  No significant past surgical history      FAMILY HISTORY:  No pertinent family history in first degree relatives      SOCIAL HISTORY:  No tobacco reported.    REVIEW OF SYSTEMS:  General:  No wt loss, fevers, chills, night sweats; positive fever  Eyes:  Good vision, no reported pain  ENT:  No sore throat, pain, runny nose, dysphagia  CV:  No pain, palpitations, hypo/hypertension  Resp:  No tachypnea, wheezing; positive SOB/cough  GI:  No pain, nausea, vomiting, diarrhea, constipation  :  No pain, bleeding, incontinence, nocturia  Muscle:  No pain, weakness  Breast:  No pain, abscess, mass, discharge  Neuro:  No weakness, tingling, memory problems  Psych:  No fatigue, insomnia, mood problems, depression  Endocrine:  No polyuria, polydipsia, cold/heat intolerance  Heme:  No petechiae, ecchymosis, easy bruisability  Skin:  No rash, tattoos, scars, edema    PHYSICAL EXAM:  Vital Signs:  Vital Signs Last 24 Hrs  T(C): 37.2 (2018 13:21), Max: 39.1 (2018 11:34)  T(F): 99 (2018 13:21), Max: 102.3 (2018 11:34)  HR: 96 (2018 13:21) (96 - 140)  BP: 105/54 (2018 13:21) (105/54 - 148/82)  RR: 20 (2018 13:21) (20 - 22)  SpO2: 100% (2018 12:52) (93% - 100%)  I&O's Summary    Tele: SR  General:  Appears stated age, well-groomed, well-nourished, no distress  HEENT:  NC/AT, patent nares w/ pink mucosa, OP clear w/o lesions, conjunctivae clear, no thyromegaly, nodules, adenopathy, no JVD  Chest:  Full & symmetric excursion, no increased effort, breath sounds clear  Cardiovascular:  Regular rhythm, S1, S2, no murmur/rub/S3/S4, radial/pedal pulses 2+  Abdomen:  Soft, non-tender, non-distended, normoactive bowel sounds  Extremities: no edema  Skin:  No rash/erythema. Skin is warm/dry  Musculoskeletal:  N/A  Neuro/Psych:  N/A    LABORATORY:                          11.6   9.0   )-----------( 152      ( 2018 12:12 )             37.1         138  |  100  |  19  ----------------------------<  173<H>  3.5   |  34<H>  |  0.81    Ca    7.7<L>      2018 12:12    TPro  7.7  /  Alb  2.9<L>  /  TBili  0.5  /  DBili  x   /  AST  26  /  ALT  14  /  AlkPhos  88      ABG - ( 2018 14:41 )  pH: x     /  pCO2: 74    /  pO2: 110   / HCO3: 30    / Base Excess: 1.3   /  SaO2: 95            CARDIAC MARKERS ( 2018 12:12 )  1.500 ng/mL / x     / 212 U/L / x     / 3.7 ng/mL      LIVER FUNCTIONS - ( 2018 12:12 )  Alb: 2.9 g/dL / Pro: 7.7 gm/dL / ALK PHOS: 88 U/L / ALT: 14 U/L / AST: 26 U/L / GGT: x           PT/INR - ( 2018 12:12 )   PT: 14.3 sec;   INR: 1.30 ratio         PTT - ( 2018 12:12 )  PTT:29.2 sec    BNPSerum Pro-Brain Natriuretic Peptide: 2330 pg/mL (18 @ 12:12)      IMAGING:  ecg: SR, wnL.    < from: Xray Chest 1 View AP/PA. (18 @ 12:18) >  Chronic abnormalities of the right hemithorax, grossly unchanged. Left   lung is clear. CT imaging of the chest recommended.    < end of copied text >    < from: CT Chest No Cont (18 @ 13:40) >  Groundglass and small nodular opacities throughout the left lung, likely   infectious in etiology.    Chronic volume loss of the right lung with cystic bronchiectasis.    < end of copied text >    ASSESSMENT:  56yo female with pmh schizophrenia, h/o chronic lung infection, h/o TB and untreated for a long time with rt lung collapse, presents with 10 days of not feeling well, worsening of chronic cough and sob. Pt at baseline mental status.  Pt lives with sister.  Per family, pt also with chronic diarrhea for years, unchanged. Pt baseline hypoxic.     ROS: + fever, no chills. No photophobia/eye pain/changes in vision, No ear pain/sore throat/dysphagia, No chest pain/palpitations. + SOB/cough, no stridor. No abdominal pain, N/V/D, no black/bloody bm. No dysuria/frequency/discharge, No headache. No Dizziness.  No rash.  No numbness/tingling/weakness.    Likely ischemic demand troponin release and no clear evidence of ACS at present.    PLAN:    Bipap/oxygen.    MEDICATIONS  (STANDING):  ARIPiprazole 10 milliGRAM(s) Oral two times a day  aspirin enteric coated 81 milliGRAM(s) Oral daily  azithromycin  IVPB 500 milliGRAM(s) IV Intermittent once  benztropine 2 milliGRAM(s) Oral two times a day  diVALproex  milliGRAM(s) Oral two times a day  enoxaparin Injectable 40 milliGRAM(s) SubCutaneous daily  piperacillin/tazobactam IVPB. 3.375 Gram(s) IV Intermittent every 8 hours  sertraline 100 milliGRAM(s) Oral two times a day  vancomycin  IVPB 1000 milliGRAM(s) IV Intermittent every 12 hours    F/u troponin trend.  Check Echo.  Supportive care. d/w pt's family at bedside.    Abdi Kelly MD, FACC, FASSHERYL, NATALYA, FACP  Director, Heart Failure Services  [facility]  , Department of Cardiology  [facility]

## 2018-01-09 NOTE — H&P ADULT - ASSESSMENT
59f with history of schizo phrenia and chronic right lung collapse presents with fever and short of breath on bipap     IMPROVE VTE Individual Risk Assessment          RISK                                                          Points    [  ] Previous VTE                                                3    [  ] Thrombophilia                                             2    [  ] Lower limb paralysis                                    2        (unable to hold up >15 seconds)      [  ] Current Cancer                                             2         (within 6 months)    [  x] Immobilization > 24 hrs                              1    [  ] ICU/CCU stay > 24 hours                            1    [  ] Age > 60                                                    1    IMPROVE VTE Score __1_______

## 2018-01-09 NOTE — H&P ADULT - NSHPLABSRESULTS_GEN_ALL_CORE
11.6   9.0   )-----------( 152      ( 09 Jan 2018 12:12 )             37.1   01-09    138  |  100  |  19  ----------------------------<  173<H>  3.5   |  34<H>  |  0.81    Ca    7.7<L>      09 Jan 2018 12:12    TPro  7.7  /  Alb  2.9<L>  /  TBili  0.5  /  DBili  x   /  AST  26  /  ALT  14  /  AlkPhos  88  01-09  < from: CT Chest No Cont (01.09.18 @ 13:40) >    Groundglass and small nodular opacities throughout the left lung, likely   infectious in etiology.    < from: Xray Chest 1 View AP/PA. (01.09.18 @ 12:18) >    Chronic abnormalities of the right hemithorax, grossly unchanged. Left   lung is clear. CT imaging of the chest recommended.

## 2018-01-09 NOTE — ED PROVIDER NOTE - PHYSICAL EXAMINATION
Gen: Alert, Well appearing. NAD    Head: NC, AT, PERRL, EOMI, normal lids/conjunctiva   ENT: Bilateral TM WNL, normal hearing, patent oropharynx without erythema/exudate, uvula midline  Neck: supple, no tenderness/meningismus/JVD   Pulm: diffuse wheeze  CV: + tachy, no M/R/G, +dist pulses   Abd: soft, NT/ND, +BS, no guarding/rebound tenderness  Mskel: no edema/erythema/cyanosis   Skin: no rash   Neuro: Alert, no sensory/motor deficits, CN 2-12 intact Gen: Alert, Well appearing. NAD    Head: NC, AT, PERRL, EOMI, normal lids/conjunctiva   ENT: Bilateral TM WNL, normal hearing, patent oropharynx without erythema/exudate, uvula midline  Neck: supple, no tenderness/meningismus/JVD   Pulm: dec ae on rt, lt diffuse wheeze  CV: + tachy, no M/R/G, +dist pulses   Abd: soft, NT/ND, +BS, no guarding/rebound tenderness  Mskel: no edema/erythema/cyanosis   Skin: no rash   Neuro: Alert, no sensory/motor deficits, CN 2-12 intact

## 2018-01-09 NOTE — ED PROVIDER NOTE - OBJECTIVE STATEMENT
56yo female with pmh schizophrenia, h/o chronic lung infection, h/o TB and ? pthx with surgery presents with 10 days of not feeling well, worsening of chronic cough and sob. Pt at baseline mental status.  Pt lives with sister.  Per family, pt also with chronic diarrhea for years, unchanged.    ROS: + fever, no chills. No photophobia/eye pain/changes in vision, No ear pain/sore throat/dysphagia, No chest pain/palpitations. + SOB/cough, no stridor. No abdominal pain, N/V/D, no black/bloody bm. No dysuria/frequency/discharge, No headache. No Dizziness.  No rash.  No numbness/tingling/weakness. 56yo female with pmh schizophrenia, h/o chronic lung infection, h/o TB and untreated for a long time with rt lung collapse, presents with 10 days of not feeling well, worsening of chronic cough and sob. Pt at baseline mental status.  Pt lives with sister.  Per family, pt also with chronic diarrhea for years, unchanged. Pt baseline hypoxic.     ROS: + fever, no chills. No photophobia/eye pain/changes in vision, No ear pain/sore throat/dysphagia, No chest pain/palpitations. + SOB/cough, no stridor. No abdominal pain, N/V/D, no black/bloody bm. No dysuria/frequency/discharge, No headache. No Dizziness.  No rash.  No numbness/tingling/weakness.

## 2018-01-09 NOTE — ED PROVIDER NOTE - CARE PLAN
Principal Discharge DX:	Sepsis Principal Discharge DX:	Sepsis  Secondary Diagnosis:	Pneumonia Principal Discharge DX:	Sepsis  Secondary Diagnosis:	Pneumonia  Secondary Diagnosis:	Hypercapnemia

## 2018-01-09 NOTE — CONSULT NOTE ADULT - PROBLEM SELECTOR RECOMMENDATION 9
acute respiratory failure due to hypercapnia.  Would continue bipap at adjusted settings of 12/6/40% and repeat ABG.  Treat for possibility of infection with BS iv abx. check procalcitonin and pancultures.  check urine legionella,

## 2018-01-10 DIAGNOSIS — R74.8 ABNORMAL LEVELS OF OTHER SERUM ENZYMES: ICD-10-CM

## 2018-01-10 DIAGNOSIS — J96.02 ACUTE RESPIRATORY FAILURE WITH HYPERCAPNIA: ICD-10-CM

## 2018-01-10 LAB
ANION GAP SERPL CALC-SCNC: 6 MMOL/L — SIGNIFICANT CHANGE UP (ref 5–17)
BASE EXCESS BLDA CALC-SCNC: 5 MMOL/L — HIGH (ref -2–2)
BLOOD GAS COMMENTS: SIGNIFICANT CHANGE UP
BLOOD GAS COMMENTS: SIGNIFICANT CHANGE UP
BLOOD GAS SOURCE: SIGNIFICANT CHANGE UP
BUN SERPL-MCNC: 14 MG/DL — SIGNIFICANT CHANGE UP (ref 7–23)
CALCIUM SERPL-MCNC: 8 MG/DL — LOW (ref 8.5–10.1)
CHLORIDE SERPL-SCNC: 102 MMOL/L — SIGNIFICANT CHANGE UP (ref 96–108)
CO2 SERPL-SCNC: 33 MMOL/L — HIGH (ref 22–31)
CREAT SERPL-MCNC: 0.58 MG/DL — SIGNIFICANT CHANGE UP (ref 0.5–1.3)
GLUCOSE SERPL-MCNC: 98 MG/DL — SIGNIFICANT CHANGE UP (ref 70–99)
HCO3 BLDA-SCNC: 34 MMOL/L — HIGH (ref 21–29)
HCT VFR BLD CALC: 38.1 % — SIGNIFICANT CHANGE UP (ref 34.5–45)
HGB BLD-MCNC: 11.9 G/DL — SIGNIFICANT CHANGE UP (ref 11.5–15.5)
HOROWITZ INDEX BLDA+IHG-RTO: 35 — SIGNIFICANT CHANGE UP
MCHC RBC-ENTMCNC: 30.5 PG — SIGNIFICANT CHANGE UP (ref 27–34)
MCHC RBC-ENTMCNC: 31.3 GM/DL — LOW (ref 32–36)
MCV RBC AUTO: 97.3 FL — SIGNIFICANT CHANGE UP (ref 80–100)
PCO2 BLDA: 77 MMHG — CRITICAL HIGH (ref 32–46)
PH BLD: 7.26 — LOW (ref 7.35–7.45)
PLATELET # BLD AUTO: 122 K/UL — LOW (ref 150–400)
PO2 BLDA: 90 MMHG — SIGNIFICANT CHANGE UP (ref 74–108)
POTASSIUM SERPL-MCNC: 4.4 MMOL/L — SIGNIFICANT CHANGE UP (ref 3.5–5.3)
POTASSIUM SERPL-SCNC: 4.4 MMOL/L — SIGNIFICANT CHANGE UP (ref 3.5–5.3)
RBC # BLD: 3.92 M/UL — SIGNIFICANT CHANGE UP (ref 3.8–5.2)
RBC # FLD: 15 % — SIGNIFICANT CHANGE UP (ref 11–15)
SAO2 % BLDA: 95 % — SIGNIFICANT CHANGE UP (ref 92–96)
SODIUM SERPL-SCNC: 141 MMOL/L — SIGNIFICANT CHANGE UP (ref 135–145)
TROPONIN I SERPL-MCNC: 0.52 NG/ML — HIGH (ref 0.01–0.04)
TROPONIN I SERPL-MCNC: 0.73 NG/ML — HIGH (ref 0.01–0.04)
WBC # BLD: 7.3 K/UL — SIGNIFICANT CHANGE UP (ref 3.8–10.5)
WBC # FLD AUTO: 7.3 K/UL — SIGNIFICANT CHANGE UP (ref 3.8–10.5)

## 2018-01-10 PROCEDURE — 99233 SBSQ HOSP IP/OBS HIGH 50: CPT

## 2018-01-10 PROCEDURE — 99291 CRITICAL CARE FIRST HOUR: CPT

## 2018-01-10 RX ADMIN — DIVALPROEX SODIUM 500 MILLIGRAM(S): 500 TABLET, DELAYED RELEASE ORAL at 05:57

## 2018-01-10 RX ADMIN — PIPERACILLIN AND TAZOBACTAM 25 GRAM(S): 4; .5 INJECTION, POWDER, LYOPHILIZED, FOR SOLUTION INTRAVENOUS at 21:44

## 2018-01-10 RX ADMIN — DIVALPROEX SODIUM 500 MILLIGRAM(S): 500 TABLET, DELAYED RELEASE ORAL at 20:58

## 2018-01-10 RX ADMIN — ARIPIPRAZOLE 10 MILLIGRAM(S): 15 TABLET ORAL at 05:57

## 2018-01-10 RX ADMIN — ARIPIPRAZOLE 10 MILLIGRAM(S): 15 TABLET ORAL at 21:43

## 2018-01-10 RX ADMIN — PIPERACILLIN AND TAZOBACTAM 25 GRAM(S): 4; .5 INJECTION, POWDER, LYOPHILIZED, FOR SOLUTION INTRAVENOUS at 07:42

## 2018-01-10 RX ADMIN — ENOXAPARIN SODIUM 40 MILLIGRAM(S): 100 INJECTION SUBCUTANEOUS at 11:54

## 2018-01-10 RX ADMIN — Medication 81 MILLIGRAM(S): at 11:54

## 2018-01-10 RX ADMIN — AZITHROMYCIN 255 MILLIGRAM(S): 500 TABLET, FILM COATED ORAL at 16:36

## 2018-01-10 RX ADMIN — Medication 2 MILLIGRAM(S): at 20:58

## 2018-01-10 RX ADMIN — Medication 250 MILLIGRAM(S): at 05:57

## 2018-01-10 RX ADMIN — SERTRALINE 100 MILLIGRAM(S): 25 TABLET, FILM COATED ORAL at 21:09

## 2018-01-10 RX ADMIN — SERTRALINE 100 MILLIGRAM(S): 25 TABLET, FILM COATED ORAL at 05:57

## 2018-01-10 RX ADMIN — PIPERACILLIN AND TAZOBACTAM 25 GRAM(S): 4; .5 INJECTION, POWDER, LYOPHILIZED, FOR SOLUTION INTRAVENOUS at 15:36

## 2018-01-10 RX ADMIN — Medication 250 MILLIGRAM(S): at 21:44

## 2018-01-10 RX ADMIN — Medication 2 MILLIGRAM(S): at 05:57

## 2018-01-10 NOTE — PROGRESS NOTE ADULT - SUBJECTIVE AND OBJECTIVE BOX
Patient is a 57y old  Female who presents with a chief complaint of short of breath (2018 16:16)       OVERNIGHT EVENTS:    MEDICATIONS  (STANDING):  ARIPiprazole 10 milliGRAM(s) Oral two times a day  aspirin enteric coated 81 milliGRAM(s) Oral daily  azithromycin  IVPB 500 milliGRAM(s) IV Intermittent every 24 hours  azithromycin  IVPB      benztropine 2 milliGRAM(s) Oral two times a day  diVALproex  milliGRAM(s) Oral two times a day  enoxaparin Injectable 40 milliGRAM(s) SubCutaneous daily  piperacillin/tazobactam IVPB. 3.375 Gram(s) IV Intermittent every 8 hours  sertraline 100 milliGRAM(s) Oral two times a day  vancomycin  IVPB 1000 milliGRAM(s) IV Intermittent every 12 hours    MEDICATIONS  (PRN):         Vital Signs Last 24 Hrs  T(C): 36.5 (10 Tin 2018 11:13), Max: 37.2 (2018 22:36)  T(F): 97.7 (10 Tin 2018 11:13), Max: 98.9 (2018 22:36)  HR: 85 (10 Tin 2018 13:25) (67 - 87)  BP: 114/77 (10 Tin 2018 11:13) (93/53 - 132/70)  BP(mean): --  RR: 18 (10 Tin 2018 11:13) (16 - 24)  SpO2: 98% (10 Tin 2018 13:25) (95% - 100%)    PHYSICAL EXAM:  GENERAL: NAD, well-groomed, well-developed, sister Freddie at bedside, on BIPAP  HEAD:  Atraumatic, Normocephalic  EYES: EOMI, PERRLA, conjunctiva and sclera clear  ENMT: No tonsillar erythema, exudates, or enlargement; Moist mucous membranes   NECK: Supple, No JVD   NERVOUS SYSTEM: lethargic, opens her eyes to verbal stimuli ,responds to Qs for short span  CHEST/LUNG: +wheezing , decreased BS Right lung  HEART: Regular rate and rhythm; No murmurs, rubs, or gallops  ABDOMEN: Soft, Nontender, Nondistended; Bowel sounds present  EXTREMITIES:  2+ Peripheral Pulses, No clubbing, cyanosis, or edema  LYMPH: No lymphadenopathy noted  SKIN: No rashes or lesions    LABS:                        11.9   7.3   )-----------( 122      ( 10 Tin 2018 04:22 )             38.1     01-10    141  |  102  |  14  ----------------------------<  98  4.4   |  33<H>  |  0.58    Ca    8.0<L>      10 Tin 2018 04:22    TPro  7.7  /  Alb  2.9<L>  /  TBili  0.5  /  DBili  x   /  AST  26  /  ALT  14  /  AlkPhos  88  09    PT/INR - ( 2018 12:12 )   PT: 14.3 sec;   INR: 1.30 ratio         PTT - ( 2018 12:12 )  PTT:29.2 sec   cardiac markers Troponin .733  CK --  Troponin 1.290  CK --    Urinalysis Basic - ( 2018 22:14 )    Color: Yellow / Appearance: Clear / S.020 / pH: x  Gluc: x / Ketone: Negative  / Bili: Negative / Urobili: Negative mg/dL   Blood: x / Protein: 30 mg/dL / Nitrite: Negative   Leuk Esterase: Negative / RBC: 11-25 /HPF / WBC 0-2   Sq Epi: x / Non Sq Epi: Occasional / Bacteria: Occasional      CAPILLARY BLOOD GLUCOSE        Cultures    RADIOLOGY & ADDITIONAL TESTS:    Imaging Personally Reviewed:  [ x] YES  [ ] NO    Consultant(s) Notes Reviewed:  [ x] YES  [ ] NO    Care Discussed with Consultants/Other Providers [x ] YES  [ ] NO

## 2018-01-10 NOTE — CONSULT NOTE ADULT - ASSESSMENT
RSV+  Low suspicion for bacterial coinfection/superinfection  Respiratory failure, with R lung probably nonfunctional  ABG remains worrisome.   Hx TB, no suspicion for active disease.      Suggestions--  Hope to limit systemic antibiotics  Contact precautions for RSV  No role for antiviral Rx with ribavirin  Close follow up of respiratory status  Follow up culture data    Thank you for the courtesy of this referral.    Manuel Mccarty MD  [phone]

## 2018-01-10 NOTE — PROGRESS NOTE ADULT - ASSESSMENT
56yo female w/schizophrenia, h/o TB and untreated for a long time, R lung collapse, bronchiectasis, now admitted for acute on chronic hypercapnic respiratory failure. Possible fluid overload. Patient also RSV positive which may have triggered acute decompensation. Less likely bacterial PNA.    Neuro: Continue home meds for schizophrenia    Resp: Acute on chronic hypercapnic respiratory failure.  - Continue BiPAP, wean as tolerated  - Currently no indication for intubation, however will intubate if respiratory status worsens  - Albuterol PRN    CV: NSTEMI, vs demand ischemia. Troponins downtrending. Possible fluid overload  - Diurese as tolerated goal net negative 1L    ID: Will continue empiric antibiotics for now, check procalcitonin    Renal: LOC    GI: LOC    FEN: NPO, replete lytes PRN    PPx: DVT prophylaxis    Attending critical care time 40 minutes

## 2018-01-10 NOTE — PROGRESS NOTE ADULT - ASSESSMENT
58yo female with pmh schizophrenia, h/o chronic lung infection, h/o TB and untreated for a long time with rt lung collapse, presents with 10 days of not feeling well, worsening of chronic cough and sob.

## 2018-01-10 NOTE — CONSULT NOTE ADULT - SUBJECTIVE AND OBJECTIVE BOX
[facility], Division of Infectious Diseases  NASIR Nj A. Lee    JADEN, GUY  57y, Female  [id number]    HPI--  Patient is a non-historian presently on NIPPV, does not respond to questions/commands appropriately. No family at bedside. Patient brought to hospital with shortness of breath and cough.   Patient with distant history tuberculosis with chronic atelectasis/scarring as a consequence, chronic diarrhea as well of unclear etiology.    Patient admitted with concern of pneumonia.  Now known to be + for RSV.  Patient with hypercarbic respiratory failure, placed on NIPPV. Patient evaluated by ICU and not deemed an ICU candidate.      PMH/PSH--  Schizophrenia  Tuberculosis  Lung disease  Autism  Hypoxia  No significant past surgical history      Allergies-- NKDA per chart      Medications--  Antibiotics: azithromycin  IVPB 500 milliGRAM(s) IV Intermittent every 24 hours  azithromycin  IVPB      piperacillin/tazobactam IVPB. 3.375 Gram(s) IV Intermittent every 8 hours  vancomycin  IVPB 1000 milliGRAM(s) IV Intermittent every 12 hours    Immunologic:   Other: ARIPiprazole  aspirin enteric coated  benztropine  diVALproex DR  enoxaparin Injectable  sertraline    Social History--  EtOH: .unknown  Tobacco: unknown  Drug Use: unknown    Family/Marital History--  No pertinent family history in first degree relatives    Remainder not relevant to clinical concern.    Travel/Environmental/Occupational History:  Unknown    Review of Systems:  Review of systems unable secondary to clinical condition.     Physical Exam--  Vital Signs: T(F): 97.7 (01-10-18 @ 11:13), Max: 99 (01-09-18 @ 13:21)  HR: 87 (01-10-18 @ 11:13)  BP: 114/77 (01-10-18 @ 11:13)  RR: 18 (01-10-18 @ 11:13)  SpO2: 97% (01-10-18 @ 11:13)  Wt(kg): --  General: Nontoxic-appearing Female in no acute distress.  HEENT: Pupils/EOM unable secondary to patient compliance. Oropharynx/dentition unable secondary to patient compliance. Anicteric. Conjunctiva pink and moist.  Neck: Not rigid. No sense of mass.  Nodes: None palpable.  Lungs: Rhonchi bilaterally.  Heart: Regular rate and rhythm. No Murmur. No rub. No gallop. No palpable thrill.  Abdomen: Bowel sounds present and normoactive. Soft. Nondistended. Nontender. No sense of mass. No organomegaly.  Back: No spinal tenderness. No costovertebral angle tenderness.   Extremities: No cyanosis or clubbing. No edema.   Skin: Warm. Dry. Good turgor.   Psychiatric: Unable      Laboratory & Imaging Data--  CBC                        11.9   7.3   )-----------( 122      ( 10 Tin 2018 04:22 )             38.1   WBC Count: 9.0 K/uL (01.09.18 @ 12:12)        Chemistries  01-10    141  |  102  |  14  ----------------------------<  98  4.4   |  33<H>  |  0.58    Ca    8.0<L>      10 Tin 2018 04:22    TPro  7.7  /  Alb  2.9<L>  /  TBili  0.5  /  DBili  x   /  AST  26  /  ALT  14  /  AlkPhos  88  01-09    Blood Gas Profile - Arterial in AM (01.10.18 @ 10:35)    Blood Gas Source: Arterial    Blood Gas Comments: 10 min.    pH, Blood: 7.26    pCO2, Arterial: 77: TYPE:(C=Critical, N=Notification, A=Abnormal) c  TESTS: _pco2  DATE/TIME CALLED: _01/10/18 10:39  CALLED TO: cindy trejo RN  READ BACK (2 Patient Identifiers)(Y/N): _y/v  READ BACK VALUES (Y/N): _y/v  CALLED BY: candis darby rt mmHg    pO2, Arterial: 90 mmHg    HCO3, Arterial: 34 mmol/L    Base Excess, Arterial: 5.0 mmol/L    Oxygen Saturation, Arterial: 95 %    FIO2, Arterial: 35.0    Rapid Respiratory Viral Panel (01.09.18 @ 13:47)    Rapid RVP Result: Detected: This Respiratory Panel uses polymerase chain reaction (PCR) to detect for  adenovirus; coronavirus (HKU1, NL63, 229E, OC43); human metapneumovirus  (hMPV); human enterovirus/rhinovirus (Entero/RV); influenza A; influenza  A/H1; influenza A/H3; influenza A/H1-2009; influenza B; parainfluenza  viruses 1, 2, 3, 4; respiratory syncytial virus; Mycoplasma pneumoniae;  and Chlamydophila pneumoniae.    Resp Syncytial Virus (RapRVP): Detected    CT Chest (personally reviewed) < from: CT Chest No Cont (01.09.18 @ 13:40) >  IMPRESSION:     Groundglass and small nodular opacities throughout the left lung, likely   infectious in etiology.    Chronic volume loss of the right lung with cystic bronchiectasis.  < end of copied text >      Culture Data  None as of yet

## 2018-01-10 NOTE — PROGRESS NOTE ADULT - PROBLEM SELECTOR PLAN 1
- On BIPAP now  - ICU was consulted yesterday   - as per pt's sister Freddie and her brother (oncologist in [place])[phone] pt has been  SOB at baseline for a while, and also more lethargic lately .  she does not see a Pulmonologist outpt , family opting for full code   - ABG= hypercapnea, RVP +, doubt bacterial superimposed infection, PCT pending  -ID on board, c/w current abx , f/u PCT, Blood Cx  -Pulm was consulted yesterday by , were again notified today

## 2018-01-11 DIAGNOSIS — F20.9 SCHIZOPHRENIA, UNSPECIFIED: ICD-10-CM

## 2018-01-11 DIAGNOSIS — A15.9 RESPIRATORY TUBERCULOSIS UNSPECIFIED: ICD-10-CM

## 2018-01-11 DIAGNOSIS — B97.4 RESPIRATORY SYNCYTIAL VIRUS AS THE CAUSE OF DISEASES CLASSIFIED ELSEWHERE: ICD-10-CM

## 2018-01-11 LAB
ALBUMIN SERPL ELPH-MCNC: 2.6 G/DL — LOW (ref 3.3–5)
ALP SERPL-CCNC: 72 U/L — SIGNIFICANT CHANGE UP (ref 40–120)
ALT FLD-CCNC: 19 U/L — SIGNIFICANT CHANGE UP (ref 12–78)
ANION GAP SERPL CALC-SCNC: 7 MMOL/L — SIGNIFICANT CHANGE UP (ref 5–17)
AST SERPL-CCNC: 21 U/L — SIGNIFICANT CHANGE UP (ref 15–37)
BASE EXCESS BLDA CALC-SCNC: 10.3 MMOL/L — HIGH (ref -2–2)
BILIRUB SERPL-MCNC: 0.4 MG/DL — SIGNIFICANT CHANGE UP (ref 0.2–1.2)
BLOOD GAS COMMENTS: SIGNIFICANT CHANGE UP
BLOOD GAS COMMENTS: SIGNIFICANT CHANGE UP
BLOOD GAS SOURCE: SIGNIFICANT CHANGE UP
BUN SERPL-MCNC: 9 MG/DL — SIGNIFICANT CHANGE UP (ref 7–23)
CALCIUM SERPL-MCNC: 8.2 MG/DL — LOW (ref 8.5–10.1)
CHLORIDE SERPL-SCNC: 99 MMOL/L — SIGNIFICANT CHANGE UP (ref 96–108)
CO2 SERPL-SCNC: 34 MMOL/L — HIGH (ref 22–31)
CREAT SERPL-MCNC: 0.42 MG/DL — LOW (ref 0.5–1.3)
CULTURE RESULTS: NO GROWTH — SIGNIFICANT CHANGE UP
GLUCOSE SERPL-MCNC: 86 MG/DL — SIGNIFICANT CHANGE UP (ref 70–99)
HCO3 BLDA-SCNC: 40 MMOL/L — HIGH (ref 21–29)
HCT VFR BLD CALC: 38.3 % — SIGNIFICANT CHANGE UP (ref 34.5–45)
HGB BLD-MCNC: 11.8 G/DL — SIGNIFICANT CHANGE UP (ref 11.5–15.5)
HOROWITZ INDEX BLDA+IHG-RTO: 36 — SIGNIFICANT CHANGE UP
LEGIONELLA AG UR QL: NEGATIVE — SIGNIFICANT CHANGE UP
MAGNESIUM SERPL-MCNC: 1.9 MG/DL — SIGNIFICANT CHANGE UP (ref 1.6–2.6)
MCHC RBC-ENTMCNC: 29.7 PG — SIGNIFICANT CHANGE UP (ref 27–34)
MCHC RBC-ENTMCNC: 30.7 GM/DL — LOW (ref 32–36)
MCV RBC AUTO: 96.7 FL — SIGNIFICANT CHANGE UP (ref 80–100)
PCO2 BLDA: 90 MMHG — CRITICAL HIGH (ref 32–46)
PH BLD: 7.27 — LOW (ref 7.35–7.45)
PHOSPHATE SERPL-MCNC: 2.3 MG/DL — LOW (ref 2.5–4.5)
PLATELET # BLD AUTO: 131 K/UL — LOW (ref 150–400)
PO2 BLDA: 204 MMHG — HIGH (ref 74–108)
POTASSIUM SERPL-MCNC: 4.3 MMOL/L — SIGNIFICANT CHANGE UP (ref 3.5–5.3)
POTASSIUM SERPL-SCNC: 4.3 MMOL/L — SIGNIFICANT CHANGE UP (ref 3.5–5.3)
PROCALCITONIN SERPL-MCNC: 0.15 NG/ML — HIGH (ref 0–0.04)
PROT SERPL-MCNC: 7.2 GM/DL — SIGNIFICANT CHANGE UP (ref 6–8.3)
RBC # BLD: 3.96 M/UL — SIGNIFICANT CHANGE UP (ref 3.8–5.2)
RBC # FLD: 14.6 % — SIGNIFICANT CHANGE UP (ref 11–15)
SAO2 % BLDA: 97 % — HIGH (ref 92–96)
SODIUM SERPL-SCNC: 140 MMOL/L — SIGNIFICANT CHANGE UP (ref 135–145)
SPECIMEN SOURCE: SIGNIFICANT CHANGE UP
TROPONIN I SERPL-MCNC: 0.33 NG/ML — HIGH (ref 0.01–0.04)
VANCOMYCIN TROUGH SERPL-MCNC: 15.6 UG/ML — SIGNIFICANT CHANGE UP (ref 10–20)
VANCOMYCIN TROUGH SERPL-MCNC: 9 UG/ML — LOW (ref 10–20)
WBC # BLD: 5.3 K/UL — SIGNIFICANT CHANGE UP (ref 3.8–10.5)
WBC # FLD AUTO: 5.3 K/UL — SIGNIFICANT CHANGE UP (ref 3.8–10.5)

## 2018-01-11 PROCEDURE — 99291 CRITICAL CARE FIRST HOUR: CPT

## 2018-01-11 RX ORDER — ALBUTEROL 90 UG/1
1 AEROSOL, METERED ORAL EVERY 4 HOURS
Qty: 0 | Refills: 0 | Status: COMPLETED | OUTPATIENT
Start: 2018-01-11 | End: 2018-12-10

## 2018-01-11 RX ORDER — IPRATROPIUM/ALBUTEROL SULFATE 18-103MCG
3 AEROSOL WITH ADAPTER (GRAM) INHALATION EVERY 6 HOURS
Qty: 0 | Refills: 0 | Status: DISCONTINUED | OUTPATIENT
Start: 2018-01-11 | End: 2018-01-13

## 2018-01-11 RX ORDER — NOREPINEPHRINE BITARTRATE/D5W 8 MG/250ML
0.05 PLASTIC BAG, INJECTION (ML) INTRAVENOUS
Qty: 8 | Refills: 0 | Status: DISCONTINUED | OUTPATIENT
Start: 2018-01-11 | End: 2018-01-11

## 2018-01-11 RX ORDER — INFLUENZA VIRUS VACCINE 15; 15; 15; 15 UG/.5ML; UG/.5ML; UG/.5ML; UG/.5ML
0.5 SUSPENSION INTRAMUSCULAR ONCE
Qty: 0 | Refills: 0 | Status: DISCONTINUED | OUTPATIENT
Start: 2018-01-11 | End: 2018-01-20

## 2018-01-11 RX ORDER — TIOTROPIUM BROMIDE 18 UG/1
1 CAPSULE ORAL; RESPIRATORY (INHALATION) DAILY
Qty: 0 | Refills: 0 | Status: COMPLETED | OUTPATIENT
Start: 2018-01-11 | End: 2018-12-10

## 2018-01-11 RX ORDER — NOREPINEPHRINE BITARTRATE/D5W 8 MG/250ML
0.2 PLASTIC BAG, INJECTION (ML) INTRAVENOUS
Qty: 8 | Refills: 0 | Status: DISCONTINUED | OUTPATIENT
Start: 2018-01-11 | End: 2018-01-11

## 2018-01-11 RX ORDER — VANCOMYCIN HCL 1 G
1000 VIAL (EA) INTRAVENOUS EVERY 8 HOURS
Qty: 0 | Refills: 0 | Status: DISCONTINUED | OUTPATIENT
Start: 2018-01-11 | End: 2018-01-12

## 2018-01-11 RX ADMIN — DIVALPROEX SODIUM 500 MILLIGRAM(S): 500 TABLET, DELAYED RELEASE ORAL at 17:18

## 2018-01-11 RX ADMIN — PIPERACILLIN AND TAZOBACTAM 25 GRAM(S): 4; .5 INJECTION, POWDER, LYOPHILIZED, FOR SOLUTION INTRAVENOUS at 13:34

## 2018-01-11 RX ADMIN — PIPERACILLIN AND TAZOBACTAM 25 GRAM(S): 4; .5 INJECTION, POWDER, LYOPHILIZED, FOR SOLUTION INTRAVENOUS at 06:34

## 2018-01-11 RX ADMIN — ARIPIPRAZOLE 10 MILLIGRAM(S): 15 TABLET ORAL at 17:18

## 2018-01-11 RX ADMIN — ARIPIPRAZOLE 10 MILLIGRAM(S): 15 TABLET ORAL at 06:34

## 2018-01-11 RX ADMIN — SERTRALINE 100 MILLIGRAM(S): 25 TABLET, FILM COATED ORAL at 06:35

## 2018-01-11 RX ADMIN — Medication 62.5 MILLIMOLE(S): at 09:16

## 2018-01-11 RX ADMIN — DIVALPROEX SODIUM 500 MILLIGRAM(S): 500 TABLET, DELAYED RELEASE ORAL at 06:34

## 2018-01-11 RX ADMIN — Medication 2 MILLIGRAM(S): at 17:18

## 2018-01-11 RX ADMIN — Medication 3 MILLILITER(S): at 11:10

## 2018-01-11 RX ADMIN — SERTRALINE 100 MILLIGRAM(S): 25 TABLET, FILM COATED ORAL at 17:18

## 2018-01-11 RX ADMIN — Medication 250 MILLIGRAM(S): at 17:26

## 2018-01-11 RX ADMIN — Medication 2 MILLIGRAM(S): at 06:34

## 2018-01-11 RX ADMIN — Medication 3 MILLILITER(S): at 17:34

## 2018-01-11 RX ADMIN — Medication 81 MILLIGRAM(S): at 11:52

## 2018-01-11 RX ADMIN — ENOXAPARIN SODIUM 40 MILLIGRAM(S): 100 INJECTION SUBCUTANEOUS at 11:51

## 2018-01-11 RX ADMIN — Medication 250 MILLIGRAM(S): at 10:46

## 2018-01-11 RX ADMIN — PIPERACILLIN AND TAZOBACTAM 25 GRAM(S): 4; .5 INJECTION, POWDER, LYOPHILIZED, FOR SOLUTION INTRAVENOUS at 21:36

## 2018-01-11 NOTE — PROGRESS NOTE ADULT - SUBJECTIVE AND OBJECTIVE BOX
58yo F w/schizophrenia, h/o TB w/ secondary R lung bronchiectasis admitted with acute on chronic hypercapnic respiratory failure and PNA and NSTEMI. RVP positive for RSV.    Pt intermittently lethargic this AM even though has used bipap overnight.    Has intermittent cough/phlegm  No fever.     ROS: denies CP, abd, nausea, vomiting, hemoptysis                        11.8   5.3   )-----------( 131      ( 11 Jan 2018 04:06 )             38.3   01-11    140  |  99  |  9   ----------------------------<  86  4.3   |  34<H>  |  0.42<L>    Ca    8.2<L>      11 Jan 2018 04:06  Phos  2.3     01-11  Mg     1.9     01-11    TPro  7.2  /  Alb  2.6<L>  /  TBili  0.4  /  DBili  x   /  AST  21  /  ALT  19  /  AlkPhos  72  01-11      MEDICATIONS  (STANDING):  ALBUTerol    90 MICROgram(s) HFA Inhaler 1 Puff(s) Inhalation every 4 hours  ALBUTerol/ipratropium for Nebulization 3 milliLiter(s) Nebulizer every 6 hours  ARIPiprazole 10 milliGRAM(s) Oral two times a day  aspirin enteric coated 81 milliGRAM(s) Oral daily  benztropine 2 milliGRAM(s) Oral two times a day  diVALproex  milliGRAM(s) Oral two times a day  enoxaparin Injectable 40 milliGRAM(s) SubCutaneous daily  influenza   Vaccine 0.5 milliLiter(s) IntraMuscular once  piperacillin/tazobactam IVPB. 3.375 Gram(s) IV Intermittent every 8 hours  sertraline 100 milliGRAM(s) Oral two times a day  tiotropium 18 MICROgram(s) Capsule 1 Capsule(s) Inhalation daily  vancomycin  IVPB 1000 milliGRAM(s) IV Intermittent every 8 hours    ICU Vital Signs Last 24 Hrs  T(C): 36.8 (11 Jan 2018 11:38), Max: 37.1 (10 Tin 2018 16:15)  T(F): 98.3 (11 Jan 2018 11:38), Max: 98.8 (10 Tin 2018 16:15)  HR: 65 (11 Jan 2018 13:09) (60 - 91)  BP: 59/38 (11 Jan 2018 12:30) (59/38 - 131/80)  BP(mean): 43 (11 Jan 2018 12:30) (43 - 103)  ABP: --  ABP(mean): --  RR: 25 (11 Jan 2018 12:30) (15 - 29)  SpO2: 100% (11 Jan 2018 13:09) (95% - 100%)     NAD, occ lethargic  dec BS R side, L sided exp rhonchi/wheezing  s1s2 reg no murmurs  soft nontender  no edema

## 2018-01-11 NOTE — PROGRESS NOTE ADULT - ASSESSMENT
56yo F w/schizophrenia, h/o TB w/ secondary R lung bronchiectasis admitted with acute on chronic hypercapnic respiratory failure and PNA and NSTEMI. RVP positive for RSV.    Neuro  at baseline mental status  cont psych meds    Pulm  pt with improvements and setbacks in hyerpcapnia likely due to not wearing it thru aprts of the day and mask leak  cont bipap  start BD tx  cont abx    CVS  trop trending down and pt asx  cont asa    Critical Care Time 40min

## 2018-01-11 NOTE — PROGRESS NOTE ADULT - ASSESSMENT
Respiratory failure, with R lung probably nonfunctional  RSV+  ABG with Resp acidosis likely superimposed on chronic metabolic alkalosis  Low suspicion for bacterial coinfection/superinfection  Procalcitonin not as specific or sensitive as one would hope, only minimally elevated at that.   This being said, BP remains labile and as such I have no objection to continuing antibiotics  Legionella antigen negative, would stop azithromycin  Hx TB, no suspicion for active disease.

## 2018-01-11 NOTE — PROGRESS NOTE ADULT - SUBJECTIVE AND OBJECTIVE BOX
[facility], Division of Infectious Diseases  NASIR Nj A. Lee    Name: GUY STANLEY  Age: 57y  Gender: Female  MRN: [id number]    Interval History--  Notes reviewed    Past Medical History--  Schizophrenia  Tuberculosis  Lung disease  Autism  Hypoxia  No significant past surgical history      For details regarding the patient's social history, family history, and other miscellaneous elements, please refer the initial infectious diseases consultation and/or the admitting history and physical examination for this admission.    Allergies    No Known Allergies    Intolerances        Medications--  Antibiotics:  azithromycin  IVPB 500 milliGRAM(s) IV Intermittent every 24 hours  azithromycin  IVPB      piperacillin/tazobactam IVPB. 3.375 Gram(s) IV Intermittent every 8 hours  vancomycin  IVPB 1000 milliGRAM(s) IV Intermittent every 12 hours    Immunologic:  influenza   Vaccine 0.5 milliLiter(s) IntraMuscular once    Other:  ARIPiprazole  aspirin enteric coated  benztropine  diVALproex DR  enoxaparin Injectable  sertraline  sodium phosphate IVPB      Review of Systems--  Review of systems unable secondary to clinical condition.     Physical Examination--  Vital Signs: T(F): 97.8 (01-11-18 @ 04:29), Max: 98.8 (01-10-18 @ 16:15)  HR: 64 (01-11-18 @ 07:30)  BP: 86/60 (01-11-18 @ 07:30)  RR: 15 (01-11-18 @ 07:30)  SpO2: 100% (01-11-18 @ 07:30)  Wt(kg): --  General: Nontoxic-appearing Female in no acute distress. Rouses briefly. on NIPPV  HEENT: Pupils/EOM unable secondary to patient compliance. Oropharynx/dentition unable secondary to patient compliance. Anicteric. Conjunctiva pink and moist.  Neck: Not rigid. No sense of mass.  Nodes: None palpable.  Lungs: Rhonchi bilaterally, prob mostly transmitted BS on R.   Heart: Regular rate and rhythm. No Murmur. No rub. No gallop. No palpable thrill.  Abdomen: Bowel sounds present and normoactive. Soft. Nondistended. Nontender. No sense of mass. No organomegaly.  Extremities: No cyanosis or clubbing. Trace edema.   Skin: Warm. Dry. Good turgor.   Psychiatric: Unable      Laboratory Studies--  CBC                        11.8   5.3   )-----------( 131      ( 11 Jan 2018 04:06 )             38.3       Chemistries  01-11    140  |  99  |  9   ----------------------------<  86  4.3   |  34<H>  |  0.42<L>    Ca    8.2<L>      11 Jan 2018 04:06  Phos  2.3     01-11  Mg     1.9     01-11    TPro  7.2  /  Alb  2.6<L>  /  TBili  0.4  /  DBili  x   /  AST  21  /  ALT  19  /  AlkPhos  72  01-11    Procalcitonin, Serum (01.11.18 @ 00:02)    Procalcitonin, Serum: 0.15    Culture Data  Culture - Blood (01.09.18 @ 13:51)    Specimen Source: .Blood Blood-Peripheral    Culture Results:   No growth to date.    Culture - Blood (01.09.18 @ 13:50)    Specimen Source: .Blood Blood-Peripheral    Culture Results:   No growth to date.

## 2018-01-12 LAB
ANION GAP SERPL CALC-SCNC: 4 MMOL/L — LOW (ref 5–17)
BASE EXCESS BLDA CALC-SCNC: 14.5 MMOL/L — HIGH (ref -2–2)
BLOOD GAS COMMENTS: SIGNIFICANT CHANGE UP
BLOOD GAS SOURCE: SIGNIFICANT CHANGE UP
BUN SERPL-MCNC: 7 MG/DL — SIGNIFICANT CHANGE UP (ref 7–23)
CALCIUM SERPL-MCNC: 8.3 MG/DL — LOW (ref 8.5–10.1)
CHLORIDE SERPL-SCNC: 98 MMOL/L — SIGNIFICANT CHANGE UP (ref 96–108)
CO2 SERPL-SCNC: 40 MMOL/L — HIGH (ref 22–31)
CREAT SERPL-MCNC: 0.51 MG/DL — SIGNIFICANT CHANGE UP (ref 0.5–1.3)
GLUCOSE SERPL-MCNC: 84 MG/DL — SIGNIFICANT CHANGE UP (ref 70–99)
HCO3 BLDA-SCNC: 42 MMOL/L — HIGH (ref 21–29)
HCT VFR BLD CALC: 34.4 % — LOW (ref 34.5–45)
HGB BLD-MCNC: 10.8 G/DL — LOW (ref 11.5–15.5)
HOROWITZ INDEX BLDA+IHG-RTO: 32 — SIGNIFICANT CHANGE UP
MAGNESIUM SERPL-MCNC: 2.1 MG/DL — SIGNIFICANT CHANGE UP (ref 1.6–2.6)
MCHC RBC-ENTMCNC: 30.6 PG — SIGNIFICANT CHANGE UP (ref 27–34)
MCHC RBC-ENTMCNC: 31.4 GM/DL — LOW (ref 32–36)
MCV RBC AUTO: 97.3 FL — SIGNIFICANT CHANGE UP (ref 80–100)
PCO2 BLDA: 69 MMHG — HIGH (ref 32–46)
PH BLD: 7.4 — SIGNIFICANT CHANGE UP (ref 7.35–7.45)
PHOSPHATE SERPL-MCNC: 2.5 MG/DL — SIGNIFICANT CHANGE UP (ref 2.5–4.5)
PLATELET # BLD AUTO: 143 K/UL — LOW (ref 150–400)
PO2 BLDA: 121 MMHG — HIGH (ref 74–108)
POTASSIUM SERPL-MCNC: 4.2 MMOL/L — SIGNIFICANT CHANGE UP (ref 3.5–5.3)
POTASSIUM SERPL-SCNC: 4.2 MMOL/L — SIGNIFICANT CHANGE UP (ref 3.5–5.3)
RBC # BLD: 3.54 M/UL — LOW (ref 3.8–5.2)
RBC # FLD: 14.6 % — SIGNIFICANT CHANGE UP (ref 11–15)
SAO2 % BLDA: 97 % — HIGH (ref 92–96)
SODIUM SERPL-SCNC: 142 MMOL/L — SIGNIFICANT CHANGE UP (ref 135–145)
VANCOMYCIN TROUGH SERPL-MCNC: 26.5 UG/ML — CRITICAL HIGH (ref 10–20)
WBC # BLD: 6.6 K/UL — SIGNIFICANT CHANGE UP (ref 3.8–10.5)
WBC # FLD AUTO: 6.6 K/UL — SIGNIFICANT CHANGE UP (ref 3.8–10.5)

## 2018-01-12 PROCEDURE — 99232 SBSQ HOSP IP/OBS MODERATE 35: CPT

## 2018-01-12 RX ADMIN — Medication 2 MILLIGRAM(S): at 05:19

## 2018-01-12 RX ADMIN — Medication 2 MILLIGRAM(S): at 17:51

## 2018-01-12 RX ADMIN — Medication 81 MILLIGRAM(S): at 12:28

## 2018-01-12 RX ADMIN — SERTRALINE 100 MILLIGRAM(S): 25 TABLET, FILM COATED ORAL at 17:51

## 2018-01-12 RX ADMIN — Medication 3 MILLILITER(S): at 00:49

## 2018-01-12 RX ADMIN — Medication 250 MILLIGRAM(S): at 09:02

## 2018-01-12 RX ADMIN — PIPERACILLIN AND TAZOBACTAM 25 GRAM(S): 4; .5 INJECTION, POWDER, LYOPHILIZED, FOR SOLUTION INTRAVENOUS at 22:03

## 2018-01-12 RX ADMIN — Medication 250 MILLIGRAM(S): at 17:51

## 2018-01-12 RX ADMIN — PIPERACILLIN AND TAZOBACTAM 25 GRAM(S): 4; .5 INJECTION, POWDER, LYOPHILIZED, FOR SOLUTION INTRAVENOUS at 05:19

## 2018-01-12 RX ADMIN — ARIPIPRAZOLE 10 MILLIGRAM(S): 15 TABLET ORAL at 05:19

## 2018-01-12 RX ADMIN — ARIPIPRAZOLE 10 MILLIGRAM(S): 15 TABLET ORAL at 17:51

## 2018-01-12 RX ADMIN — DIVALPROEX SODIUM 500 MILLIGRAM(S): 500 TABLET, DELAYED RELEASE ORAL at 17:51

## 2018-01-12 RX ADMIN — Medication 3 MILLILITER(S): at 05:55

## 2018-01-12 RX ADMIN — PIPERACILLIN AND TAZOBACTAM 25 GRAM(S): 4; .5 INJECTION, POWDER, LYOPHILIZED, FOR SOLUTION INTRAVENOUS at 14:16

## 2018-01-12 RX ADMIN — ENOXAPARIN SODIUM 40 MILLIGRAM(S): 100 INJECTION SUBCUTANEOUS at 12:28

## 2018-01-12 RX ADMIN — SERTRALINE 100 MILLIGRAM(S): 25 TABLET, FILM COATED ORAL at 05:19

## 2018-01-12 RX ADMIN — Medication 3 MILLILITER(S): at 11:44

## 2018-01-12 RX ADMIN — Medication 3 MILLILITER(S): at 18:46

## 2018-01-12 RX ADMIN — DIVALPROEX SODIUM 500 MILLIGRAM(S): 500 TABLET, DELAYED RELEASE ORAL at 05:19

## 2018-01-12 RX ADMIN — Medication 250 MILLIGRAM(S): at 02:36

## 2018-01-12 NOTE — DIETITIAN INITIAL EVALUATION ADULT. - PT NOT SOURCE
Schizophrenic, cognitive limitations/other (specify) cognitive limitations/alert/confused/other (specify)

## 2018-01-12 NOTE — PROGRESS NOTE ADULT - ASSESSMENT
Respiratory failure, with R lung probably nonfunctional  RSV+  ABG with Resp acidosis likely superimposed on chronic metabolic alkalosis  Low suspicion for bacterial coinfection/superinfection  Procalcitonin not as specific or sensitive as one would hope, only minimally elevated at that.   Hx TB, no suspicion for active disease.  Hypercarbic respiratory failure the main issue here  I remain unconvinced regarding bacterial coinfection/superinfection at present  Patient has not deteriorated despite subtherapeutic levels of vancomycin. If she had MRSA pneumonia that we had treated suboptimally, I would expect that she would have been intubated by now. CT findings more consitent with RSV rather than bacterial pneumonia, too.

## 2018-01-12 NOTE — PROGRESS NOTE ADULT - PROBLEM SELECTOR PLAN 2
Supportive care  Rx for any reactive airway disease as per CCM/Pulm  No role for ribavirin here  Precautions per hospital protocol

## 2018-01-12 NOTE — PROGRESS NOTE ADULT - ASSESSMENT
56yo F w/schizophrenia, h/o TB w/ secondary R lung bronchiectasis admitted with acute on chronic hypercapnic respiratory failure and PNA and NSTEMI. RVP positive for RSV.    Neuro  at baseline mental status  cont psych meds- if remains lethargic will decrease/ discontinue some psych meds    Pulm  cont bipap at night and in daytime prn  keep o2 sat approx 90% only  cont BD tx  cont abx    CVS  trop trending down and pt asx  cont asa  Cardiology eval    Critical Care Time 40min

## 2018-01-12 NOTE — PROGRESS NOTE ADULT - SUBJECTIVE AND OBJECTIVE BOX
HPI:  58yo female with pmh schizophrenia, h/o chronic lung infection, h/o TB and untreated for a long time with rt lung collapse, presents with 10 days of not feeling well, worsening of chronic cough and sob. Pt at baseline mental status.  Pt lives with sister.  Per family, pt also with chronic diarrhea for years, unchanged. Pt baseline hypoxic. SOB better. Off Bipap now.      REVIEW OF SYSTEMS:  Resting comfortably. No distress. No cp/sob at present.    PHYSICAL EXAM:  Vital Signs Last 24 Hrs  T(C): 37 (12 Jan 2018 11:33), Max: 37 (12 Jan 2018 11:33)  T(F): 98.6 (12 Jan 2018 11:33), Max: 98.6 (12 Jan 2018 11:33)  HR: 81 (12 Jan 2018 12:30) (54 - 81)  BP: 109/73 (12 Jan 2018 12:30) (87/54 - 172/125)  BP(mean): 83 (12 Jan 2018 12:30) (59 - 137)  RR: 20 (12 Jan 2018 12:30) (12 - 35)  SpO2: 83% (12 Jan 2018 12:30) (83% - 100%)    Tele: SR  General:  Appears stated age, well-groomed, well-nourished, no distress  HEENT:  NC/AT, patent nares w/ pink mucosa, OP clear w/o lesions, conjunctivae clear, no thyromegaly, nodules, adenopathy, no JVD  Chest:  Full & symmetric excursion, no increased effort, breath sounds clear  Cardiovascular:  Regular rhythm, S1, S2, no murmur/rub/S3/S4, radial/pedal pulses 2+  Abdomen:  Soft, non-tender, non-distended, normoactive bowel sounds  Extremities: no edema  Skin:  No rash/erythema. Skin is warm/dry  Musculoskeletal:  N/A  Neuro/Psych:  N/A    LABORATORY:                          10.8   6.6   )-----------( 143      ( 12 Jan 2018 03:59 )             34.4     01-12    142  |  98  |  7   ----------------------------<  84  4.2   |  40<H>  |  0.51    Ca    8.3<L>      12 Jan 2018 03:59  Phos  2.5     01-12  Mg     2.1     01-12    TPro  7.2  /  Alb  2.6<L>  /  TBili  0.4  /  DBili  x   /  AST  21  /  ALT  19  /  AlkPhos  72  01-11              CARDIAC MARKERS ( 09 Jan 2018 12:12 )  1.500 ng/mL / x     / 212 U/L / x     / 3.7 ng/mL      LIVER FUNCTIONS - ( 09 Jan 2018 12:12 )  Alb: 2.9 g/dL / Pro: 7.7 gm/dL / ALK PHOS: 88 U/L / ALT: 14 U/L / AST: 26 U/L / GGT: x           PT/INR - ( 09 Jan 2018 12:12 )   PT: 14.3 sec;   INR: 1.30 ratio         PTT - ( 09 Jan 2018 12:12 )  PTT:29.2 sec    BNPSerum Pro-Brain Natriuretic Peptide: 2330 pg/mL (01-09-18 @ 12:12)      IMAGING:  ecg: SR, wnL.    < from: Xray Chest 1 View AP/PA. (01.09.18 @ 12:18) >  Chronic abnormalities of the right hemithorax, grossly unchanged. Left   lung is clear. CT imaging of the chest recommended.    < end of copied text >    < from: CT Chest No Cont (01.09.18 @ 13:40) >  Groundglass and small nodular opacities throughout the left lung, likely   infectious in etiology.    Chronic volume loss of the right lung with cystic bronchiectasis.    < end of copied text >    ASSESSMENT:  58yo female with pmh schizophrenia, h/o chronic lung infection, h/o TB and untreated for a long time with rt lung collapse, presents with 10 days of not feeling well, worsening of chronic cough and sob. Pt at baseline mental status.  Pt lives with sister.  Per family, pt also with chronic diarrhea for years, unchanged. Pt baseline hypoxic.     ROS: + fever, no chills. No photophobia/eye pain/changes in vision, No ear pain/sore throat/dysphagia, No chest pain/palpitations. + SOB/cough, no stridor. No abdominal pain, N/V/D, no black/bloody bm. No dysuria/frequency/discharge, No headache. No Dizziness.  No rash.  No numbness/tingling/weakness.    Ischemic demand troponin release and no clear evidence of ACS at present.    PLAN:    MEDICATIONS  (STANDING):  ALBUTerol    90 MICROgram(s) HFA Inhaler 1 Puff(s) Inhalation every 4 hours  ALBUTerol/ipratropium for Nebulization 3 milliLiter(s) Nebulizer every 6 hours  ARIPiprazole 10 milliGRAM(s) Oral two times a day  aspirin enteric coated 81 milliGRAM(s) Oral daily  benztropine 2 milliGRAM(s) Oral two times a day  diVALproex  milliGRAM(s) Oral two times a day  enoxaparin Injectable 40 milliGRAM(s) SubCutaneous daily  influenza   Vaccine 0.5 milliLiter(s) IntraMuscular once  piperacillin/tazobactam IVPB. 3.375 Gram(s) IV Intermittent every 8 hours  sertraline 100 milliGRAM(s) Oral two times a day  tiotropium 18 MICROgram(s) Capsule 1 Capsule(s) Inhalation daily  vancomycin  IVPB 1000 milliGRAM(s) IV Intermittent every 8 hours    Echo pending.    Abdi Kelly MD, FACC, BOBBY, NATALYA, FACP  Director, Heart Failure Services  [facility]  , Department of Cardiology  [facility] [place]

## 2018-01-12 NOTE — PROGRESS NOTE ADULT - PROBLEM SELECTOR PLAN 3
Would favor stopping vancomycin  Unless stronger support for its use emerges, would limit Zosyn to 5-7 days maximum

## 2018-01-12 NOTE — DIETITIAN INITIAL EVALUATION ADULT. - PERTINENT LABORATORY DATA
01-12 Na142 mmol/L Glu 84 mg/dL K+ 4.2 mmol/L Cr  0.51 mg/dL BUN 7 mg/dL Phos 2.5 mg/dL Alb n/a   PAB n/a

## 2018-01-12 NOTE — PROGRESS NOTE ADULT - SUBJECTIVE AND OBJECTIVE BOX
56yo F w/schizophrenia, h/o TB w/ secondary R lung bronchiectasis admitted with acute on chronic hypercapnic respiratory failure and PNA and NSTEMI. RVP positive for RSV.    Pt more awake this AM  Use bipap overnight.  Now on NC and appears comfortable so far.    Has intermittent cough/phlegm  No fever.     ROS: denies CP, abd, nausea, vomiting, hemoptysis                              10.8   6.6   )-----------( 143      ( 12 Jan 2018 03:59 )             34.4   01-12    142  |  98  |  7   ----------------------------<  84  4.2   |  40<H>  |  0.51    Ca    8.3<L>      12 Jan 2018 03:59  Phos  2.5     01-12  Mg     2.1     01-12    TPro  7.2  /  Alb  2.6<L>  /  TBili  0.4  /  DBili  x   /  AST  21  /  ALT  19  /  AlkPhos  72  01-11         MEDICATIONS  (STANDING):  ALBUTerol    90 MICROgram(s) HFA Inhaler 1 Puff(s) Inhalation every 4 hours  ALBUTerol/ipratropium for Nebulization 3 milliLiter(s) Nebulizer every 6 hours  ARIPiprazole 10 milliGRAM(s) Oral two times a day  aspirin enteric coated 81 milliGRAM(s) Oral daily  benztropine 2 milliGRAM(s) Oral two times a day  diVALproex  milliGRAM(s) Oral two times a day  enoxaparin Injectable 40 milliGRAM(s) SubCutaneous daily  influenza   Vaccine 0.5 milliLiter(s) IntraMuscular once  piperacillin/tazobactam IVPB. 3.375 Gram(s) IV Intermittent every 8 hours  sertraline 100 milliGRAM(s) Oral two times a day  tiotropium 18 MICROgram(s) Capsule 1 Capsule(s) Inhalation daily  vancomycin  IVPB 1000 milliGRAM(s) IV Intermittent every 8 hours    ICU Vital Signs Last 24 Hrs  T(C): 37 (12 Jan 2018 11:33), Max: 37 (12 Jan 2018 11:33)  T(F): 98.6 (12 Jan 2018 11:33), Max: 98.6 (12 Jan 2018 11:33)  HR: 81 (12 Jan 2018 12:30) (54 - 81)  BP: 109/73 (12 Jan 2018 12:30) (87/54 - 172/125)  BP(mean): 83 (12 Jan 2018 12:30) (59 - 137)  ABP: --  ABP(mean): --  RR: 20 (12 Jan 2018 12:30) (12 - 35)  SpO2: 83% (12 Jan 2018 12:30) (83% - 100%)           NAD, occ lethargic  dec BS R side, L sided decreased exp rhonchi/wheezing  s1s2 reg no murmurs  soft nontender  no edema

## 2018-01-12 NOTE — PROGRESS NOTE ADULT - SUBJECTIVE AND OBJECTIVE BOX
[facility], Division of Infectious Diseases  NASIR Nj A. Lee    Name: GUY STANLEY  Age: 57y  Gender: Female  MRN: [id number]    Interval History--  Notes reviewed. Remain in CCU on NIPPV. Oxygenation ok despite malpositioning of mask (I alerted staff).  Rouses briefly.  Vanco trough low, dose adjusted by CCM. Azithro stopped.    Past Medical History--  Schizophrenia  Tuberculosis  Lung disease  Autism  Hypoxia  No significant past surgical history      For details regarding the patient's social history, family history, and other miscellaneous elements, please refer the initial infectious diseases consultation and/or the admitting history and physical examination for this admission.    Allergies    No Known Allergies    Intolerances      Medications--  Antibiotics:  piperacillin/tazobactam IVPB. 3.375 Gram(s) IV Intermittent every 8 hours  vancomycin  IVPB 1000 milliGRAM(s) IV Intermittent every 8 hours    Immunologic:  influenza   Vaccine 0.5 milliLiter(s) IntraMuscular once    Other:  ALBUTerol    90 MICROgram(s) HFA Inhaler  ALBUTerol/ipratropium for Nebulization  ARIPiprazole  aspirin enteric coated  benztropine  diVALproex DR  enoxaparin Injectable  sertraline  tiotropium 18 MICROgram(s) Capsule      Review of Systems--  Review of systems unable secondary to clinical condition.     Physical Examination--  Vital Signs: T(F): 98.3 (01-12-18 @ 08:00), Max: 98.3 (01-11-18 @ 11:38)  HR: 65 (01-12-18 @ 08:00)  BP: 110/68 (01-12-18 @ 08:00)  RR: 25 (01-12-18 @ 08:00)  SpO2: 99% (01-12-18 @ 08:00)  Wt(kg): --  General: Nontoxic-appearing Female in no acute distress. Rouses briefly. on NIPPV  HEENT: Pupils/EOM unable secondary to patient compliance. Oropharynx/dentition unable secondary to patient compliance. Anicteric. Conjunctiva pink and moist.  Neck: Not rigid. No sense of mass.  Nodes: None palpable.  Lungs: Rhonchi bilaterally, prob mostly transmitted BS on R.   Heart: Regular rate and rhythm. No Murmur. No rub. No gallop. No palpable thrill.  Abdomen: Bowel sounds present and normoactive. Soft. Nondistended. Nontender. No sense of mass. No organomegaly.  Extremities: No cyanosis or clubbing. Trace edema.   Skin: Warm. Dry. Good turgor.   Psychiatric: Unable      Laboratory Studies--  CBC                        10.8   6.6   )-----------( 143      ( 12 Jan 2018 03:59 )             34.4       Chemistries  01-12    142  |  98  |  7   ----------------------------<  84  4.2   |  40<H>  |  0.51    Ca    8.3<L>      12 Jan 2018 03:59  Phos  2.5     01-12  Mg     2.1     01-12    TPro  7.2  /  Alb  2.6<L>  /  TBili  0.4  /  DBili  x   /  AST  21  /  ALT  19  /  AlkPhos  72  01-11    Vancomycin Level, Trough -Pre 4th Dose, order if dosed q6/8/12h (01.11.18 @ 11:33)    Vancomycin Level, Trough: 9.0: Vancomycin trough levels should be rapidly reached and maintained at  15-20 ug/ml for life threatening MRSA  infections such as sepsis, endocarditis, osteomyelitis and pneumonia. A  first trough level should be drawn  before the 3rd or 4th dose.  Risk of renal toxicity is increased for levels >15 ug/ml, in patients on  other nephrotoxic drugs, who are  hemodynamically unstable, have unstable renal function, or are on  Vancomycin therapy for >14 days. Renal function with  creatinine levels should be monitored for those patients. ug/mL        Culture Data  Culture - Urine (01.10.18 @ 08:38)    Specimen Source: .Urine Clean Catch (Midstream)    Culture Results:   No growth    Culture - Blood (01.09.18 @ 13:51)    Specimen Source: .Blood Blood-Peripheral    Culture Results:   No growth to date.    Culture - Blood (01.09.18 @ 13:50)    Specimen Source: .Blood Blood-Peripheral    Culture Results:   No growth to date.

## 2018-01-12 NOTE — DIETITIAN INITIAL EVALUATION ADULT. - OTHER INFO
Pt seen today due to CCU admission. Pt lives at home c her sister whom does the food shopping/cooking. Pt has frequent bowel movements at home, has never had a colostomy or followed a lactose free diet.  Pt's sister states that pt has lost weight over the years but weight has remained stable over the past couple of years. She eats Halal foods and fish. She is on a Regular diet c minimal p.o. intake as per RN. % Intake not documented.

## 2018-01-13 LAB
ANION GAP SERPL CALC-SCNC: 5 MMOL/L — SIGNIFICANT CHANGE UP (ref 5–17)
BASE EXCESS BLDA CALC-SCNC: 17.3 MMOL/L — SIGNIFICANT CHANGE UP (ref -2–2)
BLOOD GAS COMMENTS: SIGNIFICANT CHANGE UP
BLOOD GAS COMMENTS: SIGNIFICANT CHANGE UP
BLOOD GAS SOURCE: SIGNIFICANT CHANGE UP
BUN SERPL-MCNC: 7 MG/DL — SIGNIFICANT CHANGE UP (ref 7–23)
CALCIUM SERPL-MCNC: 8.5 MG/DL — SIGNIFICANT CHANGE UP (ref 8.5–10.1)
CHLORIDE SERPL-SCNC: 101 MMOL/L — SIGNIFICANT CHANGE UP (ref 96–108)
CO2 SERPL-SCNC: 41 MMOL/L — HIGH (ref 22–31)
CREAT SERPL-MCNC: 0.78 MG/DL — SIGNIFICANT CHANGE UP (ref 0.5–1.3)
GLUCOSE SERPL-MCNC: 90 MG/DL — SIGNIFICANT CHANGE UP (ref 70–99)
HCO3 BLDA-SCNC: 44 MMOL/L — HIGH (ref 21–29)
HCT VFR BLD CALC: 36 % — SIGNIFICANT CHANGE UP (ref 34.5–45)
HGB BLD-MCNC: 11.3 G/DL — LOW (ref 11.5–15.5)
HOROWITZ INDEX BLDA+IHG-RTO: 24 — SIGNIFICANT CHANGE UP
MAGNESIUM SERPL-MCNC: 2.3 MG/DL — SIGNIFICANT CHANGE UP (ref 1.6–2.6)
MCHC RBC-ENTMCNC: 29.8 PG — SIGNIFICANT CHANGE UP (ref 27–34)
MCHC RBC-ENTMCNC: 31.4 GM/DL — LOW (ref 32–36)
MCV RBC AUTO: 94.8 FL — SIGNIFICANT CHANGE UP (ref 80–100)
PCO2 BLDA: 61 MMHG — HIGH (ref 32–46)
PH BLD: 7.47 — HIGH (ref 7.35–7.45)
PHOSPHATE SERPL-MCNC: 3.1 MG/DL — SIGNIFICANT CHANGE UP (ref 2.5–4.5)
PLATELET # BLD AUTO: 170 K/UL — SIGNIFICANT CHANGE UP (ref 150–400)
PO2 BLDA: 68 MMHG — LOW (ref 74–108)
POTASSIUM SERPL-MCNC: 3.6 MMOL/L — SIGNIFICANT CHANGE UP (ref 3.5–5.3)
POTASSIUM SERPL-SCNC: 3.6 MMOL/L — SIGNIFICANT CHANGE UP (ref 3.5–5.3)
RBC # BLD: 3.8 M/UL — SIGNIFICANT CHANGE UP (ref 3.8–5.2)
RBC # FLD: 14.6 % — SIGNIFICANT CHANGE UP (ref 11–15)
SAO2 % BLDA: SIGNIFICANT CHANGE UP % (ref 92–96)
SODIUM SERPL-SCNC: 147 MMOL/L — HIGH (ref 135–145)
VANCOMYCIN TROUGH SERPL-MCNC: 30 UG/ML — CRITICAL HIGH (ref 10–20)
WBC # BLD: 6.8 K/UL — SIGNIFICANT CHANGE UP (ref 3.8–10.5)
WBC # FLD AUTO: 6.8 K/UL — SIGNIFICANT CHANGE UP (ref 3.8–10.5)

## 2018-01-13 PROCEDURE — 99291 CRITICAL CARE FIRST HOUR: CPT

## 2018-01-13 PROCEDURE — 70450 CT HEAD/BRAIN W/O DYE: CPT | Mod: 26

## 2018-01-13 PROCEDURE — 71045 X-RAY EXAM CHEST 1 VIEW: CPT | Mod: 26

## 2018-01-13 PROCEDURE — 99231 SBSQ HOSP IP/OBS SF/LOW 25: CPT

## 2018-01-13 RX ORDER — IPRATROPIUM/ALBUTEROL SULFATE 18-103MCG
3 AEROSOL WITH ADAPTER (GRAM) INHALATION EVERY 4 HOURS
Qty: 0 | Refills: 0 | Status: DISCONTINUED | OUTPATIENT
Start: 2018-01-13 | End: 2018-01-18

## 2018-01-13 RX ORDER — PIPERACILLIN AND TAZOBACTAM 4; .5 G/20ML; G/20ML
3.38 INJECTION, POWDER, LYOPHILIZED, FOR SOLUTION INTRAVENOUS EVERY 8 HOURS
Qty: 0 | Refills: 0 | Status: DISCONTINUED | OUTPATIENT
Start: 2018-01-13 | End: 2018-01-20

## 2018-01-13 RX ADMIN — SERTRALINE 100 MILLIGRAM(S): 25 TABLET, FILM COATED ORAL at 17:39

## 2018-01-13 RX ADMIN — DIVALPROEX SODIUM 500 MILLIGRAM(S): 500 TABLET, DELAYED RELEASE ORAL at 05:08

## 2018-01-13 RX ADMIN — PIPERACILLIN AND TAZOBACTAM 12.5 GRAM(S): 4; .5 INJECTION, POWDER, LYOPHILIZED, FOR SOLUTION INTRAVENOUS at 22:08

## 2018-01-13 RX ADMIN — ARIPIPRAZOLE 10 MILLIGRAM(S): 15 TABLET ORAL at 05:08

## 2018-01-13 RX ADMIN — PIPERACILLIN AND TAZOBACTAM 12.5 GRAM(S): 4; .5 INJECTION, POWDER, LYOPHILIZED, FOR SOLUTION INTRAVENOUS at 14:27

## 2018-01-13 RX ADMIN — Medication 81 MILLIGRAM(S): at 12:23

## 2018-01-13 RX ADMIN — DIVALPROEX SODIUM 500 MILLIGRAM(S): 500 TABLET, DELAYED RELEASE ORAL at 17:39

## 2018-01-13 RX ADMIN — Medication 2 MILLIGRAM(S): at 05:08

## 2018-01-13 RX ADMIN — Medication 3 MILLILITER(S): at 17:02

## 2018-01-13 RX ADMIN — Medication 3 MILLILITER(S): at 11:17

## 2018-01-13 RX ADMIN — Medication 3 MILLILITER(S): at 05:29

## 2018-01-13 RX ADMIN — PIPERACILLIN AND TAZOBACTAM 25 GRAM(S): 4; .5 INJECTION, POWDER, LYOPHILIZED, FOR SOLUTION INTRAVENOUS at 05:08

## 2018-01-13 RX ADMIN — Medication 2 MILLIGRAM(S): at 17:39

## 2018-01-13 RX ADMIN — ENOXAPARIN SODIUM 40 MILLIGRAM(S): 100 INJECTION SUBCUTANEOUS at 12:23

## 2018-01-13 RX ADMIN — Medication 3 MILLILITER(S): at 00:18

## 2018-01-13 RX ADMIN — SERTRALINE 100 MILLIGRAM(S): 25 TABLET, FILM COATED ORAL at 05:08

## 2018-01-13 RX ADMIN — Medication 3 MILLILITER(S): at 22:41

## 2018-01-13 RX ADMIN — Medication 100 MILLIGRAM(S): at 12:22

## 2018-01-13 RX ADMIN — ARIPIPRAZOLE 10 MILLIGRAM(S): 15 TABLET ORAL at 17:39

## 2018-01-13 NOTE — PROGRESS NOTE ADULT - SUBJECTIVE AND OBJECTIVE BOX
HPI:  58yo female with pmh schizophrenia, h/o chronic lung infection, h/o TB and untreated for a long time with rt lung collapse, presents with 10 days of not feeling well, worsening of chronic cough and sob. Pt at baseline mental status.  Pt lives with sister.  Per family, pt also with chronic diarrhea for years, unchanged. Pt baseline hypoxic. SOB better. Off Bipap now.      REVIEW OF SYSTEMS:  Resting comfortably. No distress. No cp/sob at present.    PHYSICAL EXAM:  Vital Signs Last 24 Hrs  Vital Signs Last 24 Hrs  T(C): 36.9 (13 Jan 2018 08:00), Max: 37.1 (13 Jan 2018 00:00)  T(F): 98.5 (13 Jan 2018 08:00), Max: 98.8 (13 Jan 2018 00:00)  HR: 67 (13 Jan 2018 16:00) (56 - 77)  BP: 123/64 (13 Jan 2018 16:00) (97/54 - 144/69)  BP(mean): 78 (13 Jan 2018 16:00) (65 - 98)  RR: 27 (13 Jan 2018 16:00) (14 - 38)  SpO2: 100% (13 Jan 2018 16:00) (89% - 100%)    General:  Appears stated age, well-groomed, well-nourished, no distress  HEENT:  NC/AT, patent nares w/ pink mucosa, OP clear w/o lesions, conjunctivae clear, no thyromegaly, nodules, adenopathy, no JVD  Chest:  Full & symmetric excursion, no increased effort, breath sounds clear  Cardiovascular:  Regular rhythm, S1, S2, no murmur/rub/S3/S4, radial/pedal pulses 2+  Abdomen:  Soft, non-tender, non-distended, normoactive bowel sounds  Extremities: no edema  Skin:  No rash/erythema. Skin is warm/dry  Musculoskeletal:  N/A  Neuro/Psych:  N/A    LABORATORY:                          11.3   6.8   )-----------( 170      ( 13 Jan 2018 04:29 )             36.0     01-13    147<H>  |  101  |  7   ----------------------------<  90  3.6   |  41<H>  |  0.78    Ca    8.5      13 Jan 2018 04:29  Phos  3.1     01-13  Mg     2.3     01-13      < from: TTE Echo Doppler w/o Cont (01.12.18 @ 19:07) >   1. Left ventricular ejection fraction, by visual estimation, is 55 to   60%.   2. Normal left ventricular internal cavity size.   3. Normal right ventricular size and function.   4. The left atrium is normal in size.   5. The right atrium is normal in size.   6. There is no evidence of pericardial effusion.   7. Structurally normal mitral valve, with normal leaflet excursion.   8. Trace mitral valve regurgitation.   9. Structurally normal tricuspid valve, with normal leaflet excursion.  10. Normal trileaflet aortic valve with normal opening.    < end of copied text >        IMAGING:  ecg: SR, wnL.    < from: Xray Chest 1 View AP/PA. (01.09.18 @ 12:18) >  Chronic abnormalities of the right hemithorax, grossly unchanged. Left   lung is clear. CT imaging of the chest recommended.    < end of copied text >    < from: CT Chest No Cont (01.09.18 @ 13:40) >  Groundglass and small nodular opacities throughout the left lung, likely   infectious in etiology.    Chronic volume loss of the right lung with cystic bronchiectasis.    < end of copied text >    ASSESSMENT:  58yo female with pmh schizophrenia, h/o chronic lung infection, h/o TB with rt lung collapse, presents with 10 days of not feeling well, worsening of chronic cough and sob.     Ischemic demand troponin release and normal EF.    PLAN:    MEDICATIONS  (STANDING):  ALBUTerol    90 MICROgram(s) HFA Inhaler 1 Puff(s) Inhalation every 4 hours  ALBUTerol/ipratropium for Nebulization 3 milliLiter(s) Nebulizer every 4 hours  ARIPiprazole 10 milliGRAM(s) Oral two times a day  aspirin enteric coated 81 milliGRAM(s) Oral daily  benztropine 2 milliGRAM(s) Oral two times a day  diVALproex  milliGRAM(s) Oral two times a day  enoxaparin Injectable 40 milliGRAM(s) SubCutaneous daily  piperacillin/tazobactam IVPB. 3.375 Gram(s) IV Intermittent every 8 hours  sertraline 100 milliGRAM(s) Oral two times a day  tiotropium 18 MICROgram(s) Capsule 1 Capsule(s) Inhalation daily    ICU care management.  Signing off, please call back prn.    Abdi Kelly MD, FACC, NATALYA ROSALES, FACP  Director, Heart Failure Services  [facility]  , Department of Cardiology  [facility]

## 2018-01-13 NOTE — PROGRESS NOTE ADULT - ASSESSMENT
57F PMH schizophrenia, h/o TB w/ secondary R lung bronchiectasis presents for fever, SOB, worsening chronic cough. Admitted with acute on chronic hypercapnic respiratory failure with hypoxic respiratory failure on non invasive ventilation, PNA and elevated troponin. RVP positive for RSV.    1. NEURO  - pt without any motor deficits, however with a more discernable left facial droop today  - will check CT head r/o CVA  - lethargy may be in part due to underlying psych meds  - blood gas checked today with improved CO2 ruling out hypercapnia as main source of lethargy    2. PULM  - supportive care for RSV infection  - continue empiric zosyn for PNA, d/c vanco as cultures negative for any staph and pt also with supra therapeutic vanco level, complete 5 day course IV antibiotics (day 4 of 5) to cover potential superimposed gram negative bacterial PNA, though this can be viral PNA due to RSV  - cont with oxygen supplementation to maintain O2 sat > 90%  - BiPAP at night and prn for chronic hypercapnia  - cont bronchodilators for wheeze, if wheeze worsens will consider steroids    3. CV  - elevated troponin now down trending  - appreciate cardiology input: trop likely in setting of demand ischemia- cont with ASA    4. Electrolytes  - mild hypernatremia  - encourage water intake with meals  - monitor lytes    5. GEN  - physical therapy, OOB to chair  - if remains stable in next 24 hours will transfer to medical floor  - DVT prophylaxis    Total Critical Care Time: 32 min

## 2018-01-13 NOTE — PROGRESS NOTE ADULT - SUBJECTIVE AND OBJECTIVE BOX
HPI:  57F PMH schizophrenia, h/o TB w/ secondary R lung bronchiectasis presents for fever, SOB, worsening chronic cough. Admitted with acute on chronic hypercapnic respiratory failure on non invasive ventilation, PNA and elevated troponin. RVP positive for RSV.    24 hr events:  follows commands, lethargic but arousable  weaned off BiPAP yesterday  pt refused BiPAP overnight  requires small amount oxygen supplementation 1L NC to maintain O2 sat > 90, desaturates on RA to the 80s      ## ROS:  [x] limited due to lethargy  CONSTITUTIONAL: No fever, no chills  EYES: denies visual changes  RESPIRATORY: + cough, no SOB  CARDIOVASCULAR: No chest pain  GASTROINTESTINAL: No abdominal pain  NEUROLOGICAL: No headaches      ## Labs:  CBC:                        11.3   6.8   )-----------( 170      ( 13 Jan 2018 04:29 )             36.0     Chem:  01-13    147<H>  |  101  |  7   ----------------------------<  90  3.6   |  41<H>  |  0.78    Ca    8.5      13 Jan 2018 04:29  Phos  3.1     01-13  Mg     2.3     01-13    Rapid Respiratory Viral Panel (01.09.18 @ 13:47)    Rapid RVP Result:  Resp Syncytial Virus (RapRVP): Detected    Culture - Urine (01.10.18 @ 08:38)    Specimen Source: .Urine Clean Catch (Midstream)    Culture Results: No growth    Culture - Blood (01.09.18 @ 13:51)    Specimen Source: .Blood Blood-Peripheral    Culture Results: No growth to date.    Culture - Blood (01.09.18 @ 13:50)    Specimen Source: .Blood Blood-Peripheral    Culture Results: No growth to date.      ## Imaging:  CT chest < from: CT Chest No Cont (01.09.18 @ 13:40) >  Groundglass and small nodular opacities throughout the left lung, likely   infectious in etiology. Chronic volume loss of the right lung with cystic bronchiectasis.    CXR < from: Xray Chest 1 View AP/PA. (01.13.18 @ 09:06) >  There is right volume loss. There is mediastinal shift to the right. Only   a portion of the upper right lung appears aerated and is very poorly   evaluated on this exam. The overall appearance is unchanged from the   prior exam.       ## Medications:  piperacillin/tazobactam IVPB. 3.375 Gram(s) IV Intermittent every 8 hours    ALBUTerol    90 MICROgram(s) HFA Inhaler 1 Puff(s) Inhalation every 4 hours  ALBUTerol/ipratropium for Nebulization 3 milliLiter(s) Nebulizer every 6 hours  guaiFENesin   Syrup  (Sugar-Free) 100 milliGRAM(s) Oral every 6 hours PRN  tiotropium 18 MICROgram(s) Capsule 1 Capsule(s) Inhalation daily      aspirin enteric coated 81 milliGRAM(s) Oral daily  enoxaparin Injectable 40 milliGRAM(s) SubCutaneous daily      ARIPiprazole 10 milliGRAM(s) Oral two times a day  benztropine 2 milliGRAM(s) Oral two times a day  diVALproex  milliGRAM(s) Oral two times a day  sertraline 100 milliGRAM(s) Oral two times a day      ## Vitals:  T(C): 36.9 (01-13-18 @ 08:00), Max: 37.1 (01-12-18 @ 15:30)  HR: 64 (01-13-18 @ 14:00) (56 - 80)  BP: 108/59 (01-13-18 @ 14:00) (97/54 - 144/69)  BP(mean): 72 (01-13-18 @ 14:00) (65 - 98)  RR: 16 (01-13-18 @ 14:00) (14 - 38)  SpO2: 98% (01-13-18 @ 14:00) (89% - 100%)  Wt(kg): 74.4  ABG: ABG - ( 13 Jan 2018 08:59 )  pH: 7.47   /  pCO2: 61    /  pO2: 68    / HCO3: 44    / Base Excess: 17.3  /  SaO2: ?91.7         01-12 @ 07:01  -  01-13 @ 07:00  --------------------------------------------------------  IN: 1090 mL / OUT: 2250 mL / NET: -1160 mL    01-13 @ 07:01 - 01-13 @ 15:20  --------------------------------------------------------  IN: 200 mL / OUT: 300 mL / NET: -100 mL        ## P/E:  Gen: out of bed sitting in chair, no distress, mildly lethargic but easily arousable  HEENT: PERRL, EOMI  Resp: bilateral expiratory wheeze, scattered rhonchi bilaterally  CVS: S1S2, RRR  Abd: soft NT/ND +BS  Ext: no c/c/e  Neuro: mildly lethargic but easily arousable, follows commands, generalized weakness but strength in upper and lower extremities symmetrical 3-4/5 motor strength; left facial droop    CENTRAL LINE: [ ] YES [x] NO      COELHO: [x] YES [ ] NO    REMOVE:  [x] YES [ ] NO  - trial of void, inserted for urinary retention    A-LINE:  [ ] YES [x] NO      GLOBAL ISSUE/BEST PRACTICE:  Analgesia: n/a  Sedation: n/a  HOB elevation: yes  Stress ulcer prophylaxis: n/a  VTE prophylaxis: lovenox  Oral Care: n/a  Glycemic control: n/a  Nutrition: regular diet    CODE STATUS: [x] full code  [ ] DNR  [ ] DNI  [ ] [facility]  Goals of care discussion: [x] yes

## 2018-01-14 LAB
ANION GAP SERPL CALC-SCNC: 6 MMOL/L — SIGNIFICANT CHANGE UP (ref 5–17)
BASE EXCESS BLDA CALC-SCNC: 16.8 MMOL/L — HIGH (ref -2–2)
BLOOD GAS COMMENTS: SIGNIFICANT CHANGE UP
BLOOD GAS COMMENTS: SIGNIFICANT CHANGE UP
BLOOD GAS SOURCE: SIGNIFICANT CHANGE UP
BUN SERPL-MCNC: 9 MG/DL — SIGNIFICANT CHANGE UP (ref 7–23)
CALCIUM SERPL-MCNC: 8.2 MG/DL — LOW (ref 8.5–10.1)
CHLORIDE SERPL-SCNC: 100 MMOL/L — SIGNIFICANT CHANGE UP (ref 96–108)
CO2 SERPL-SCNC: 40 MMOL/L — HIGH (ref 22–31)
CREAT SERPL-MCNC: 0.88 MG/DL — SIGNIFICANT CHANGE UP (ref 0.5–1.3)
CULTURE RESULTS: SIGNIFICANT CHANGE UP
CULTURE RESULTS: SIGNIFICANT CHANGE UP
GLUCOSE BLDC GLUCOMTR-MCNC: 88 MG/DL — SIGNIFICANT CHANGE UP (ref 70–99)
GLUCOSE SERPL-MCNC: 83 MG/DL — SIGNIFICANT CHANGE UP (ref 70–99)
HCO3 BLDA-SCNC: 44 MMOL/L — HIGH (ref 21–29)
HCT VFR BLD CALC: 36.4 % — SIGNIFICANT CHANGE UP (ref 34.5–45)
HGB BLD-MCNC: 11.5 G/DL — SIGNIFICANT CHANGE UP (ref 11.5–15.5)
HOROWITZ INDEX BLDA+IHG-RTO: 28 — SIGNIFICANT CHANGE UP
MAGNESIUM SERPL-MCNC: 2.4 MG/DL — SIGNIFICANT CHANGE UP (ref 1.6–2.6)
MCHC RBC-ENTMCNC: 29.9 PG — SIGNIFICANT CHANGE UP (ref 27–34)
MCHC RBC-ENTMCNC: 31.4 GM/DL — LOW (ref 32–36)
MCV RBC AUTO: 95.1 FL — SIGNIFICANT CHANGE UP (ref 80–100)
PCO2 BLDA: 64 MMHG — HIGH (ref 32–46)
PH BLD: 7.45 — SIGNIFICANT CHANGE UP (ref 7.35–7.45)
PHOSPHATE SERPL-MCNC: 4 MG/DL — SIGNIFICANT CHANGE UP (ref 2.5–4.5)
PLATELET # BLD AUTO: 152 K/UL — SIGNIFICANT CHANGE UP (ref 150–400)
PO2 BLDA: 82 MMHG — SIGNIFICANT CHANGE UP (ref 74–108)
POTASSIUM SERPL-MCNC: 3.6 MMOL/L — SIGNIFICANT CHANGE UP (ref 3.5–5.3)
POTASSIUM SERPL-SCNC: 3.6 MMOL/L — SIGNIFICANT CHANGE UP (ref 3.5–5.3)
RBC # BLD: 3.83 M/UL — SIGNIFICANT CHANGE UP (ref 3.8–5.2)
RBC # FLD: 15 % — SIGNIFICANT CHANGE UP (ref 11–15)
SAO2 % BLDA: 94 % — SIGNIFICANT CHANGE UP (ref 92–96)
SODIUM SERPL-SCNC: 146 MMOL/L — HIGH (ref 135–145)
SPECIMEN SOURCE: SIGNIFICANT CHANGE UP
SPECIMEN SOURCE: SIGNIFICANT CHANGE UP
WBC # BLD: 7.3 K/UL — SIGNIFICANT CHANGE UP (ref 3.8–10.5)
WBC # FLD AUTO: 7.3 K/UL — SIGNIFICANT CHANGE UP (ref 3.8–10.5)

## 2018-01-14 PROCEDURE — 99233 SBSQ HOSP IP/OBS HIGH 50: CPT

## 2018-01-14 RX ORDER — ACETAMINOPHEN 500 MG
650 TABLET ORAL ONCE
Qty: 0 | Refills: 0 | Status: COMPLETED | OUTPATIENT
Start: 2018-01-14 | End: 2018-01-14

## 2018-01-14 RX ADMIN — ARIPIPRAZOLE 10 MILLIGRAM(S): 15 TABLET ORAL at 05:22

## 2018-01-14 RX ADMIN — Medication 2 MILLIGRAM(S): at 18:03

## 2018-01-14 RX ADMIN — SERTRALINE 100 MILLIGRAM(S): 25 TABLET, FILM COATED ORAL at 18:04

## 2018-01-14 RX ADMIN — Medication 3 MILLILITER(S): at 05:49

## 2018-01-14 RX ADMIN — Medication 3 MILLILITER(S): at 22:09

## 2018-01-14 RX ADMIN — Medication 650 MILLIGRAM(S): at 23:22

## 2018-01-14 RX ADMIN — ENOXAPARIN SODIUM 40 MILLIGRAM(S): 100 INJECTION SUBCUTANEOUS at 12:51

## 2018-01-14 RX ADMIN — PIPERACILLIN AND TAZOBACTAM 12.5 GRAM(S): 4; .5 INJECTION, POWDER, LYOPHILIZED, FOR SOLUTION INTRAVENOUS at 21:40

## 2018-01-14 RX ADMIN — Medication 81 MILLIGRAM(S): at 12:51

## 2018-01-14 RX ADMIN — SERTRALINE 100 MILLIGRAM(S): 25 TABLET, FILM COATED ORAL at 05:22

## 2018-01-14 RX ADMIN — Medication 2 MILLIGRAM(S): at 05:22

## 2018-01-14 RX ADMIN — ARIPIPRAZOLE 10 MILLIGRAM(S): 15 TABLET ORAL at 18:03

## 2018-01-14 RX ADMIN — DIVALPROEX SODIUM 500 MILLIGRAM(S): 500 TABLET, DELAYED RELEASE ORAL at 18:03

## 2018-01-14 RX ADMIN — PIPERACILLIN AND TAZOBACTAM 12.5 GRAM(S): 4; .5 INJECTION, POWDER, LYOPHILIZED, FOR SOLUTION INTRAVENOUS at 13:05

## 2018-01-14 RX ADMIN — Medication 3 MILLILITER(S): at 18:21

## 2018-01-14 RX ADMIN — DIVALPROEX SODIUM 500 MILLIGRAM(S): 500 TABLET, DELAYED RELEASE ORAL at 05:22

## 2018-01-14 RX ADMIN — Medication 100 MILLIGRAM(S): at 22:27

## 2018-01-14 RX ADMIN — Medication 3 MILLILITER(S): at 10:24

## 2018-01-14 RX ADMIN — PIPERACILLIN AND TAZOBACTAM 12.5 GRAM(S): 4; .5 INJECTION, POWDER, LYOPHILIZED, FOR SOLUTION INTRAVENOUS at 05:17

## 2018-01-14 RX ADMIN — Medication 3 MILLILITER(S): at 14:27

## 2018-01-14 RX ADMIN — Medication 3 MILLILITER(S): at 01:33

## 2018-01-14 RX ADMIN — Medication 650 MILLIGRAM(S): at 22:40

## 2018-01-14 NOTE — PHYSICAL THERAPY INITIAL EVALUATION ADULT - LEVEL OF CONSCIOUSNESS, REHAB EVAL
alert/confused Pt. initiated attention but has difficulty sustaining attention. Pt. is able to follow one-step commands but inconsistently. Pt. may functional automatically in over-learned behaviors. Pt. does not initiate activities and may wander if left unsupervised./confused/alert

## 2018-01-14 NOTE — PHYSICAL THERAPY INITIAL EVALUATION ADULT - PLANNED THERAPY INTERVENTIONS, PT EVAL
balance training/bed mobility training/gait training/stair negotiation/transfer training/strengthening

## 2018-01-14 NOTE — PHYSICAL THERAPY INITIAL EVALUATION ADULT - ORIENTATION, REHAB EVAL
not oriented to person, place, time or situation/pt able to recall name & month of birthday but unable to answer day/year. PT reoriented pt to current date, location, & reason for hospital admission. Pt with history of Autism.

## 2018-01-14 NOTE — PHYSICAL THERAPY INITIAL EVALUATION ADULT - ADDITIONAL COMMENTS
PT spoke with pt's sister Freddie over the phone to obtain social history/prior level of function ([phone]). Pt lives with her mother & sister in a private home, 2 entry steps (no rails). Pt sleeps on 1st floor inside home. Prior to admission pt was independent with all functional mobility including community ambulation without a device. Pt is right hand dominant. Pt able to perform all ADL's, cooking, & cleaning prior to admission. No home O2 prior to admission. Goal of therapy (per sister) is for pt to return to her prior level of function & return home.

## 2018-01-14 NOTE — PROGRESS NOTE ADULT - ASSESSMENT
57F PMH schizophrenia, h/o TB w/ secondary R lung bronchiectasis presents for fever, SOB, worsening chronic cough. Admitted with acute on chronic hypercapnic respiratory failure with hypoxic respiratory failure on non invasive ventilation, PNA and elevated troponin. RVP positive for RSV.    1. NEURO  - pt without any motor deficits, left facial droop ?baseline  (CT head negative for acute infarct)  - lethargy may be in part due to underlying psych meds but is easily arousable and follows commands  - blood gas checked today with improved CO2 ruling out hypercapnia as main source of lethargy    2. PULM  - supportive care for RSV infection  - continue empiric zosyn for PNA, d/c antibiotics after today's course to complete 5 day course IV antibiotics (day 5 of 5) covering potential superimposed gram negative bacterial PNA, though this can be viral PNA due to RSV  - cont with oxygen supplementation to maintain O2 sat > 90%, wean to room air as tolerates  - BiPAP at night and prn for chronic hypercapnia  - cont bronchodilators for wheeze, frequency increased to q4 hours yesterday with improvement today    3. CV  - elevated troponin now down trending  - appreciate cardiology input: trop likely in setting of demand ischemia- cont with ASA    4. Electrolytes  - mild hypernatremia  - cont to encourage water intake with meals  - monitor lytes    5. GEN  - physical therapy, OOB to chair  - DVT prophylaxis  - stable for transfer to medical floor

## 2018-01-14 NOTE — CHART NOTE - NSCHARTNOTEFT_GEN_A_CORE
[facility] NP NOTE    HPI    58yo female w/schizophrenia, h/o TB and untreated for a long time, R lung collapse, bronchiectasis, now admitted for acute on chronic hypercapnic respiratory failure. Possible fluid overload. Patient also RSV positive which may have triggered acute decompensation.  Admitted to ICU for acute on chronic hypercapnic respiratory failure with R lung probably nonfunctional with RSV+ Initially on BiPAP, weaned to  BiPAP at night and prn for chronic hypercapnia now . Continue albuterol now q4 hours and PRN,  requires small amount oxygen supplementation 1L NC to maintain O2 sat > 90, desaturates on RA to the 80s. NSTEMI, vs demand ischemia. Troponins downtrending. Cardiology on case Dr. Kelly.  Diurese as tolerated goal net negative.   Patient hx of schizophrenia, continue Abilify, Cogentin, Depakote and Zoloft. Patient remains stable for transfer to medicine service.    Report given to Dr. uGillaume, hospitalist service.    Marta Altamirano, AYAD-C  critical care

## 2018-01-14 NOTE — PHYSICAL THERAPY INITIAL EVALUATION ADULT - MODIFIED CLINICAL TEST OF SENSORY INTEGRATION IN BALANCE TEST
Barthel Index: Feeding Score _5/10__, Bathing Score _0/5__, Grooming Score _0/5__, Dressing Score _5/10__, Bowels Score __10/10_, Bladder Score _10/10__, Toilet Score _5/10__, Transfers Score _10/15__, Mobility Score __0/15_, Stairs Score _0/10__,     Total Score ___45/100

## 2018-01-14 NOTE — PROGRESS NOTE ADULT - SUBJECTIVE AND OBJECTIVE BOX
HPI:  56yo female with pmh schizophrenia, h/o chronic lung infection, h/o TB and untreated for a long time with rt lung collapse, presents with 10 days of not feeling well, worsening of chronic cough and sob. Pt at baseline mental status.  Pt lives with sister.  Per family, pt also with chronic diarrhea for years, unchanged. Pt baseline hypoxic.     	ROS: + fever, no chills. No photophobia/eye pain/changes in vision, No ear pain/sore throat/dysphagia, No chest pain/palpitations. + SOB/cough, no stridor. No abdominal pain, N/V/D, no black/bloody bm. No dysuria/frequency/discharge, No headache. No Dizziness.  No rash.  No numbness/tingling/weakness. (09 Jan 2018 16:16)      24 hr events:      ## ROS:  [ ] unable to obtain  CONSTITUTIONAL: No fever, weight loss, or fatigue  EYES: No eye pain, visual disturbances, or discharge  ENMT:  No difficulty hearing, tinnitus, vertigo; No sinus or throat pain  NECK: No pain or stiffness  RESPIRATORY: No cough, wheezing, chills or hemoptysis; No shortness of breath  CARDIOVASCULAR: No chest pain, palpitations, dizziness, or leg swelling  GASTROINTESTINAL: No abdominal or epigastric pain. No nausea, vomiting, or hematemesis; No diarrhea or constipation. No melena or hematochezia.  GENITOURINARY: No dysuria, frequency, hematuria, or incontinence  NEUROLOGICAL: No headaches, memory loss, loss of strength, numbness, or tremors  SKIN: No itching, burning, rashes, or lesions   LYMPH NODES: No enlarged glands  ENDOCRINE: No heat or cold intolerance; No hair loss  MUSCULOSKELETAL: No joint pain or swelling; No muscle, back, or extremity pain  PSYCHIATRIC: No depression, anxiety, mood swings, or difficulty sleeping  HEME/LYMPH: No easy bruising, or bleeding gums  ALLERGY AND IMMUNOLOGIC: No hives or eczema    ## Labs:  CBC:                        11.5   7.3   )-----------( 152      ( 14 Jan 2018 04:25 )             36.4     Chem:  01-14    146<H>  |  100  |  9   ----------------------------<  83  3.6   |  40<H>  |  0.88    Ca    8.2<L>      14 Jan 2018 04:25  Phos  4.0     01-14  Mg     2.4     01-14      Coags:          ## Imaging:    ## Medications:  piperacillin/tazobactam IVPB. 3.375 Gram(s) IV Intermittent every 8 hours      ALBUTerol    90 MICROgram(s) HFA Inhaler 1 Puff(s) Inhalation every 4 hours  ALBUTerol/ipratropium for Nebulization 3 milliLiter(s) Nebulizer every 4 hours  guaiFENesin   Syrup  (Sugar-Free) 100 milliGRAM(s) Oral every 6 hours PRN  tiotropium 18 MICROgram(s) Capsule 1 Capsule(s) Inhalation daily      aspirin enteric coated 81 milliGRAM(s) Oral daily  enoxaparin Injectable 40 milliGRAM(s) SubCutaneous daily      ARIPiprazole 10 milliGRAM(s) Oral two times a day  benztropine 2 milliGRAM(s) Oral two times a day  diVALproex  milliGRAM(s) Oral two times a day  sertraline 100 milliGRAM(s) Oral two times a day      ## Vitals:  T(C): 36.8 (01-14-18 @ 19:28), Max: 36.8 (01-14-18 @ 08:00)  HR: 69 (01-14-18 @ 18:26) (54 - 90)  BP: 112/96 (01-14-18 @ 16:00) (101/64 - 148/66)  BP(mean): 100 (01-14-18 @ 16:00) (68 - 100)  RR: 20 (01-14-18 @ 16:00) (12 - 34)  SpO2: 96% (01-14-18 @ 18:26) (86% - 100%)  Wt(kg): --  Vent:   ABG: ABG - ( 14 Jan 2018 08:30 )  pH: x     /  pCO2: 64    /  pO2: 82    / HCO3: 44    / Base Excess: 16.8  /  SaO2: 94                    01-13 @ 07:01  -  01-14 @ 07:00  --------------------------------------------------------  IN: 1100 mL / OUT: 650 mL / NET: 450 mL    01-14 @ 07:01  - 01-14 @ 20:10  --------------------------------------------------------  IN: 530 mL / OUT: 0 mL / NET: 530 mL          ## P/E:  Gen: lying comfortably in bed in no apparent distress  HEENT: PERRL, EOMI  Resp: CTA B/L no c/r/w  CVS: S1S2 no m/r/g  Abd: soft NT/ND +BS  Ext: no c/c/e  Neuro: A&Ox3    CENTRAL LINE: [ ] YES [ ] NO  LOCATION:   DATE INSERTED:  REMOVE: [ ] YES [ ] NO      COELHO: [ ] YES [ ] NO    DATE INSERTED:  REMOVE:  [ ] YES [ ] NO      A-LINE:  [ ] YES [ ] NO  LOCATION:   DATE INSERTED:  REMOVE:  [ ] YES [ ] NO  EXPLAIN:    GLOBAL ISSUE/BEST PRACTICE:  Analgesia:  Sedation:  HOB elevation: yes  Stress ulcer prophylaxis:  VTE prophylaxis:  Oral Care:  Glycemic control:  Nutrition:    CODE STATUS: [ ] full code  [ ] DNR  [ ] DNI  [ ] [facility]  Goals of care discussion: [ ] yes HPI:  57F PMH schizophrenia, h/o TB w/ secondary R lung bronchiectasis presents for fever, SOB, worsening chronic cough. Admitted with acute on chronic hypercapnic respiratory failure on non invasive ventilation, PNA and elevated troponin. RVP positive for RSV.    24 hr events:  tolerated nocturnal BiPAP  sitting in chair today  still at times with lethargy but arousable to follow commands  refusing suctioning by staff      ## ROS:  [x] limited due to pt's lethargy  no fever, no chills  + cough  no CP  no HA  no abdominal pain  no SOB    ## Labs:  CBC:                        11.5   7.3   )-----------( 152      ( 14 Jan 2018 04:25 )             36.4     Chem:  01-14    146<H>  |  100  |  9   ----------------------------<  83  3.6   |  40<H>  |  0.88    Ca    8.2<L>      14 Jan 2018 04:25  Phos  4.0     01-14  Mg     2.4     01-14      Rapid Respiratory Viral Panel (01.09.18 @ 13:47)    Rapid RVP Result:  Resp Syncytial Virus (RapRVP): Detected    Culture - Urine (01.10.18 @ 08:38)    Specimen Source: .Urine Clean Catch (Midstream)    Culture Results: No growth    Culture - Blood (01.09.18 @ 13:51)    Specimen Source: .Blood Blood-Peripheral    Culture Results: No growth to date.    Culture - Blood (01.09.18 @ 13:50)    Specimen Source: .Blood Blood-Peripheral    Culture Results: No growth to date.        ## Imaging:  CT head < from: CT Head No Cont (01.13.18 @ 15:47) >  No gross intracranial hemorrhage, mass effect, or midline shift.  No CT evidence of acute territorial infarct.        ## Medications:  piperacillin/tazobactam IVPB. 3.375 Gram(s) IV Intermittent every 8 hours      ALBUTerol    90 MICROgram(s) HFA Inhaler 1 Puff(s) Inhalation every 4 hours  ALBUTerol/ipratropium for Nebulization 3 milliLiter(s) Nebulizer every 4 hours  guaiFENesin   Syrup  (Sugar-Free) 100 milliGRAM(s) Oral every 6 hours PRN  tiotropium 18 MICROgram(s) Capsule 1 Capsule(s) Inhalation daily      aspirin enteric coated 81 milliGRAM(s) Oral daily  enoxaparin Injectable 40 milliGRAM(s) SubCutaneous daily      ARIPiprazole 10 milliGRAM(s) Oral two times a day  benztropine 2 milliGRAM(s) Oral two times a day  diVALproex  milliGRAM(s) Oral two times a day  sertraline 100 milliGRAM(s) Oral two times a day      ## Vitals:  T(C): 36.8 (01-14-18 @ 19:28), Max: 36.8 (01-14-18 @ 08:00)  HR: 69 (01-14-18 @ 18:26) (54 - 90)  BP: 112/96 (01-14-18 @ 16:00) (101/64 - 148/66)  BP(mean): 100 (01-14-18 @ 16:00) (68 - 100)  RR: 20 (01-14-18 @ 16:00) (12 - 34)  SpO2: 96% (01-14-18 @ 18:26) (86% - 100%)  Wt(kg): 74.9  ABG: ABG - ( 14 Jan 2018 08:30 )  pH: 7.45   /  pCO2: 64    /  pO2: 82    / HCO3: 44    / Base Excess: 16.8  /  SaO2: 94            01-13 @ 07:01 - 01-14 @ 07:00  --------------------------------------------------------  IN: 1100 mL / OUT: 650 mL / NET: 450 mL    01-14 @ 07:01 - 01-14 @ 20:10  --------------------------------------------------------  IN: 530 mL / OUT: 0 mL / NET: 530 mL          ## P/E:  Gen: sitting in chair, no distress, mildly lethargic but easily arousable  HEENT: PERRL, EOMI  Resp: bilateral expiratory wheeze improved, scattered rhonchi bilaterally  CVS: S1S2, RRR  Abd: soft NT/ND +BS  Ext: no c/c/e  Neuro: mildly lethargic but easily arousable, follows commands, generalized weakness but strength in upper and lower extremities symmetrical; left facial droop    CENTRAL LINE: [ ] YES [x] NO    COELHO: [ ] YES [x] NO      A-LINE:  [ ] YES [x] NO      GLOBAL ISSUE/BEST PRACTICE:  Analgesia: n/a  Sedation: n/a  HOB elevation: yes  Stress ulcer prophylaxis: n/a  VTE prophylaxis: lovenox  Oral Care: n/a  Glycemic control: n/a  Nutrition: regular diet    CODE STATUS: [x] full code  [ ] DNR  [ ] DNI  [ ] [facility]  Goals of care discussion: [x] yes

## 2018-01-14 NOTE — PHYSICAL THERAPY INITIAL EVALUATION ADULT - IMPAIRMENTS FOUND, PT EVAL
bed mobility, transfers, stair negotiation/gait, locomotion, and balance/aerobic capacity/endurance/muscle strength

## 2018-01-15 LAB
ALBUMIN SERPL ELPH-MCNC: 2.4 G/DL — LOW (ref 3.3–5)
ALP SERPL-CCNC: 63 U/L — SIGNIFICANT CHANGE UP (ref 40–120)
ALT FLD-CCNC: 13 U/L — SIGNIFICANT CHANGE UP (ref 12–78)
ANION GAP SERPL CALC-SCNC: 6 MMOL/L — SIGNIFICANT CHANGE UP (ref 5–17)
AST SERPL-CCNC: 12 U/L — LOW (ref 15–37)
BILIRUB SERPL-MCNC: 0.4 MG/DL — SIGNIFICANT CHANGE UP (ref 0.2–1.2)
BUN SERPL-MCNC: 11 MG/DL — SIGNIFICANT CHANGE UP (ref 7–23)
CALCIUM SERPL-MCNC: 8.3 MG/DL — LOW (ref 8.5–10.1)
CHLORIDE SERPL-SCNC: 99 MMOL/L — SIGNIFICANT CHANGE UP (ref 96–108)
CO2 SERPL-SCNC: 40 MMOL/L — HIGH (ref 22–31)
CREAT SERPL-MCNC: 0.86 MG/DL — SIGNIFICANT CHANGE UP (ref 0.5–1.3)
GLUCOSE SERPL-MCNC: 115 MG/DL — HIGH (ref 70–99)
HCT VFR BLD CALC: 33.2 % — LOW (ref 34.5–45)
HCT VFR BLD CALC: 33.4 % — LOW (ref 34.5–45)
HGB BLD-MCNC: 10.1 G/DL — LOW (ref 11.5–15.5)
HGB BLD-MCNC: 10.2 G/DL — LOW (ref 11.5–15.5)
MAGNESIUM SERPL-MCNC: 2.6 MG/DL — SIGNIFICANT CHANGE UP (ref 1.6–2.6)
MCHC RBC-ENTMCNC: 29.4 PG — SIGNIFICANT CHANGE UP (ref 27–34)
MCHC RBC-ENTMCNC: 29.7 PG — SIGNIFICANT CHANGE UP (ref 27–34)
MCHC RBC-ENTMCNC: 30.3 GM/DL — LOW (ref 32–36)
MCHC RBC-ENTMCNC: 30.8 GM/DL — LOW (ref 32–36)
MCV RBC AUTO: 96.4 FL — SIGNIFICANT CHANGE UP (ref 80–100)
MCV RBC AUTO: 96.9 FL — SIGNIFICANT CHANGE UP (ref 80–100)
PHOSPHATE SERPL-MCNC: 4.9 MG/DL — HIGH (ref 2.5–4.5)
PLATELET # BLD AUTO: 165 K/UL — SIGNIFICANT CHANGE UP (ref 150–400)
PLATELET # BLD AUTO: 180 K/UL — SIGNIFICANT CHANGE UP (ref 150–400)
POTASSIUM SERPL-MCNC: 3.4 MMOL/L — LOW (ref 3.5–5.3)
POTASSIUM SERPL-SCNC: 3.4 MMOL/L — LOW (ref 3.5–5.3)
PROT SERPL-MCNC: 6.9 GM/DL — SIGNIFICANT CHANGE UP (ref 6–8.3)
RBC # BLD: 3.44 M/UL — LOW (ref 3.8–5.2)
RBC # BLD: 3.44 M/UL — LOW (ref 3.8–5.2)
RBC # FLD: 14.8 % — SIGNIFICANT CHANGE UP (ref 11–15)
RBC # FLD: 14.9 % — SIGNIFICANT CHANGE UP (ref 11–15)
SODIUM SERPL-SCNC: 145 MMOL/L — SIGNIFICANT CHANGE UP (ref 135–145)
WBC # BLD: 7.6 K/UL — SIGNIFICANT CHANGE UP (ref 3.8–10.5)
WBC # BLD: 7.8 K/UL — SIGNIFICANT CHANGE UP (ref 3.8–10.5)
WBC # FLD AUTO: 7.6 K/UL — SIGNIFICANT CHANGE UP (ref 3.8–10.5)
WBC # FLD AUTO: 7.8 K/UL — SIGNIFICANT CHANGE UP (ref 3.8–10.5)

## 2018-01-15 PROCEDURE — 99232 SBSQ HOSP IP/OBS MODERATE 35: CPT

## 2018-01-15 RX ORDER — POTASSIUM CHLORIDE 20 MEQ
20 PACKET (EA) ORAL ONCE
Qty: 0 | Refills: 0 | Status: COMPLETED | OUTPATIENT
Start: 2018-01-15 | End: 2018-01-15

## 2018-01-15 RX ORDER — POTASSIUM CHLORIDE 20 MEQ
20 PACKET (EA) ORAL ONCE
Qty: 0 | Refills: 0 | Status: DISCONTINUED | OUTPATIENT
Start: 2018-01-15 | End: 2018-01-15

## 2018-01-15 RX ADMIN — Medication 20 MILLIEQUIVALENT(S): at 06:01

## 2018-01-15 RX ADMIN — Medication 3 MILLILITER(S): at 18:05

## 2018-01-15 RX ADMIN — PIPERACILLIN AND TAZOBACTAM 12.5 GRAM(S): 4; .5 INJECTION, POWDER, LYOPHILIZED, FOR SOLUTION INTRAVENOUS at 05:01

## 2018-01-15 RX ADMIN — Medication 100 MILLIGRAM(S): at 12:12

## 2018-01-15 RX ADMIN — Medication 3 MILLILITER(S): at 09:03

## 2018-01-15 RX ADMIN — Medication 3 MILLILITER(S): at 21:10

## 2018-01-15 RX ADMIN — Medication 3 MILLILITER(S): at 01:30

## 2018-01-15 RX ADMIN — SERTRALINE 100 MILLIGRAM(S): 25 TABLET, FILM COATED ORAL at 05:01

## 2018-01-15 RX ADMIN — ARIPIPRAZOLE 10 MILLIGRAM(S): 15 TABLET ORAL at 17:29

## 2018-01-15 RX ADMIN — ENOXAPARIN SODIUM 40 MILLIGRAM(S): 100 INJECTION SUBCUTANEOUS at 12:11

## 2018-01-15 RX ADMIN — ARIPIPRAZOLE 10 MILLIGRAM(S): 15 TABLET ORAL at 05:01

## 2018-01-15 RX ADMIN — Medication 3 MILLILITER(S): at 14:02

## 2018-01-15 RX ADMIN — SERTRALINE 100 MILLIGRAM(S): 25 TABLET, FILM COATED ORAL at 17:29

## 2018-01-15 RX ADMIN — Medication 2 MILLIGRAM(S): at 05:01

## 2018-01-15 RX ADMIN — DIVALPROEX SODIUM 500 MILLIGRAM(S): 500 TABLET, DELAYED RELEASE ORAL at 05:01

## 2018-01-15 RX ADMIN — Medication 81 MILLIGRAM(S): at 12:12

## 2018-01-15 RX ADMIN — PIPERACILLIN AND TAZOBACTAM 12.5 GRAM(S): 4; .5 INJECTION, POWDER, LYOPHILIZED, FOR SOLUTION INTRAVENOUS at 14:01

## 2018-01-15 RX ADMIN — PIPERACILLIN AND TAZOBACTAM 12.5 GRAM(S): 4; .5 INJECTION, POWDER, LYOPHILIZED, FOR SOLUTION INTRAVENOUS at 21:20

## 2018-01-15 RX ADMIN — Medication 100 MILLIGRAM(S): at 05:19

## 2018-01-15 RX ADMIN — Medication 3 MILLILITER(S): at 06:02

## 2018-01-15 RX ADMIN — DIVALPROEX SODIUM 500 MILLIGRAM(S): 500 TABLET, DELAYED RELEASE ORAL at 17:29

## 2018-01-15 RX ADMIN — Medication 2 MILLIGRAM(S): at 17:29

## 2018-01-15 NOTE — PROGRESS NOTE ADULT - ASSESSMENT
57F PMH schizophrenia, h/o TB w/ secondary R lung bronchiectasis presents for fever, SOB, worsening chronic cough. Admitted with acute on chronic hypercapnic respiratory failure with hypoxic respiratory failure on non invasive ventilation, PNA and elevated troponin. RVP positive for RSV.    1. NEURO   left facial droop  - lethargy may be in part due to underlying psych meds but is easily arousable and follows commands    2. PULM  - supportive care for RSV infection  - continue empiric zosyn for PNA  - cont with oxygen supplementation to maintain O2 sat > 90%, wean to room air as tolerates  - BiPAP at night and prn for chronic hypercapnia        3 Electrolytes  - mild hypokalemia  K  suppliment   repeat   BMP   in AM   5. GEN  - physical therapy, OOB to chair  - DVT prophylaxis

## 2018-01-15 NOTE — CONSULT NOTE ADULT - CONSULT REASON
SOB/Troponin elevated
Pneumonia
SOB
chronic compensated respiratory failure   consult dictated [phone]

## 2018-01-15 NOTE — PROGRESS NOTE ADULT - SUBJECTIVE AND OBJECTIVE BOX
Patient is a 57y old  Female who presents with a chief complaint of short of breath (09 Jan 2018 16:16)      INTERVAL HPI/OVERNIGHT EVENTS:  pt   seen   in   CCU   Sitting   on   chair  Comfortably  case   D/W   Family / sister   at  bed  side   MEDICATIONS  (STANDING):  ALBUTerol    90 MICROgram(s) HFA Inhaler 1 Puff(s) Inhalation every 4 hours  ALBUTerol/ipratropium for Nebulization 3 milliLiter(s) Nebulizer every 4 hours  ARIPiprazole 10 milliGRAM(s) Oral two times a day  aspirin enteric coated 81 milliGRAM(s) Oral daily  benztropine 2 milliGRAM(s) Oral two times a day  diVALproex  milliGRAM(s) Oral two times a day  enoxaparin Injectable 40 milliGRAM(s) SubCutaneous daily  influenza   Vaccine 0.5 milliLiter(s) IntraMuscular once  piperacillin/tazobactam IVPB. 3.375 Gram(s) IV Intermittent every 8 hours  sertraline 100 milliGRAM(s) Oral two times a day  tiotropium 18 MICROgram(s) Capsule 1 Capsule(s) Inhalation daily    MEDICATIONS  (PRN):  guaiFENesin   Syrup  (Sugar-Free) 100 milliGRAM(s) Oral every 6 hours PRN Cough      Allergies    No Known Allergies    Intolerances          Vital Signs Last 24 Hrs  T(C): 36.8 (15 Tin 2018 11:54), Max: 37 (15 Tin 2018 02:30)  T(F): 98.3 (15 Tin 2018 11:54), Max: 98.6 (15 Tin 2018 02:30)  HR: 76 (15 Tin 2018 12:09) (62 - 79)  BP: 104/66 (15 Tin 2018 12:09) (103/66 - 134/80)  BP(mean): 74 (15 Tin 2018 12:09) (73 - 100)  RR: 24 (15 Tin 2018 12:09) (13 - 31)  SpO2: 99% (15 Tin 2018 12:09) (87% - 99%)    PHYSICAL EXAM:  Pt  is  Lethargic   but   Follow  verbal  Commands   Lt  facial   droop  pr   Heart - S1 , S2  +   Lung-  B/L   air   entry   ABD- obese , soft , BS +   EXT - No edema   LABS:                        10.2   7.6   )-----------( 165      ( 15 Tin 2018 04:04 )             33.2     01-15    145  |  99  |  11  ----------------------------<  115<H>  3.4<L>   |  40<H>  |  0.86    Ca    8.3<L>      15 Tin 2018 04:04  Phos  4.9     01-15  Mg     2.6     01-15    TPro  6.9  /  Alb  2.4<L>  /  TBili  0.4  /  DBili  x   /  AST  12<L>  /  ALT  13  /  AlkPhos  63  01-15        CAPILLARY BLOOD GLUCOSE        Lipid panel:       ABG - ( 14 Jan 2018 08:30 )  pH: x     /  pCO2: 64    /  pO2: 82    / HCO3: 44    / Base Excess: 16.8  /  SaO2: 94                  TSH     RADIOLOGY & ADDITIONAL TESTS:    Imaging Personally Reviewed:  [ ] YES  [ ] NO    Consultant(s) Notes Reviewed:  [ ] YES  [ ] NO    Care Discussed with Consultants/Other Providers [ ] YES  [ ] NO

## 2018-01-16 DIAGNOSIS — D64.9 ANEMIA, UNSPECIFIED: ICD-10-CM

## 2018-01-16 DIAGNOSIS — E87.6 HYPOKALEMIA: ICD-10-CM

## 2018-01-16 DIAGNOSIS — F20.9 SCHIZOPHRENIA, UNSPECIFIED: ICD-10-CM

## 2018-01-16 DIAGNOSIS — R26.81 UNSTEADINESS ON FEET: ICD-10-CM

## 2018-01-16 LAB
ANION GAP SERPL CALC-SCNC: 5 MMOL/L — SIGNIFICANT CHANGE UP (ref 5–17)
BUN SERPL-MCNC: 10 MG/DL — SIGNIFICANT CHANGE UP (ref 7–23)
CALCIUM SERPL-MCNC: 8.4 MG/DL — LOW (ref 8.5–10.1)
CHLORIDE SERPL-SCNC: 99 MMOL/L — SIGNIFICANT CHANGE UP (ref 96–108)
CO2 SERPL-SCNC: 41 MMOL/L — HIGH (ref 22–31)
CREAT SERPL-MCNC: 0.73 MG/DL — SIGNIFICANT CHANGE UP (ref 0.5–1.3)
GLUCOSE SERPL-MCNC: 97 MG/DL — SIGNIFICANT CHANGE UP (ref 70–99)
HCT VFR BLD CALC: 32.7 % — LOW (ref 34.5–45)
HGB BLD-MCNC: 10.2 G/DL — LOW (ref 11.5–15.5)
MCHC RBC-ENTMCNC: 30.3 PG — SIGNIFICANT CHANGE UP (ref 27–34)
MCHC RBC-ENTMCNC: 31.2 GM/DL — LOW (ref 32–36)
MCV RBC AUTO: 97.2 FL — SIGNIFICANT CHANGE UP (ref 80–100)
PLATELET # BLD AUTO: 161 K/UL — SIGNIFICANT CHANGE UP (ref 150–400)
POTASSIUM SERPL-MCNC: 3.4 MMOL/L — LOW (ref 3.5–5.3)
POTASSIUM SERPL-SCNC: 3.4 MMOL/L — LOW (ref 3.5–5.3)
RBC # BLD: 3.36 M/UL — LOW (ref 3.8–5.2)
RBC # FLD: 14.7 % — SIGNIFICANT CHANGE UP (ref 11–15)
SODIUM SERPL-SCNC: 145 MMOL/L — SIGNIFICANT CHANGE UP (ref 135–145)
WBC # BLD: 9.1 K/UL — SIGNIFICANT CHANGE UP (ref 3.8–10.5)
WBC # FLD AUTO: 9.1 K/UL — SIGNIFICANT CHANGE UP (ref 3.8–10.5)

## 2018-01-16 PROCEDURE — 99233 SBSQ HOSP IP/OBS HIGH 50: CPT

## 2018-01-16 RX ORDER — POTASSIUM CHLORIDE 20 MEQ
20 PACKET (EA) ORAL ONCE
Qty: 0 | Refills: 0 | Status: COMPLETED | OUTPATIENT
Start: 2018-01-16 | End: 2018-01-16

## 2018-01-16 RX ADMIN — SERTRALINE 100 MILLIGRAM(S): 25 TABLET, FILM COATED ORAL at 17:42

## 2018-01-16 RX ADMIN — DIVALPROEX SODIUM 500 MILLIGRAM(S): 500 TABLET, DELAYED RELEASE ORAL at 05:46

## 2018-01-16 RX ADMIN — Medication 3 MILLILITER(S): at 11:00

## 2018-01-16 RX ADMIN — Medication 3 MILLILITER(S): at 21:28

## 2018-01-16 RX ADMIN — PIPERACILLIN AND TAZOBACTAM 12.5 GRAM(S): 4; .5 INJECTION, POWDER, LYOPHILIZED, FOR SOLUTION INTRAVENOUS at 22:37

## 2018-01-16 RX ADMIN — Medication 3 MILLILITER(S): at 17:23

## 2018-01-16 RX ADMIN — Medication 81 MILLIGRAM(S): at 11:27

## 2018-01-16 RX ADMIN — ARIPIPRAZOLE 10 MILLIGRAM(S): 15 TABLET ORAL at 17:42

## 2018-01-16 RX ADMIN — DIVALPROEX SODIUM 500 MILLIGRAM(S): 500 TABLET, DELAYED RELEASE ORAL at 17:42

## 2018-01-16 RX ADMIN — Medication 2 MILLIGRAM(S): at 05:46

## 2018-01-16 RX ADMIN — PIPERACILLIN AND TAZOBACTAM 12.5 GRAM(S): 4; .5 INJECTION, POWDER, LYOPHILIZED, FOR SOLUTION INTRAVENOUS at 13:24

## 2018-01-16 RX ADMIN — Medication 20 MILLIEQUIVALENT(S): at 08:40

## 2018-01-16 RX ADMIN — ARIPIPRAZOLE 10 MILLIGRAM(S): 15 TABLET ORAL at 05:47

## 2018-01-16 RX ADMIN — Medication 3 MILLILITER(S): at 01:15

## 2018-01-16 RX ADMIN — Medication 3 MILLILITER(S): at 05:18

## 2018-01-16 RX ADMIN — Medication 2 MILLIGRAM(S): at 17:42

## 2018-01-16 RX ADMIN — SERTRALINE 100 MILLIGRAM(S): 25 TABLET, FILM COATED ORAL at 05:46

## 2018-01-16 RX ADMIN — Medication 3 MILLILITER(S): at 13:39

## 2018-01-16 RX ADMIN — PIPERACILLIN AND TAZOBACTAM 12.5 GRAM(S): 4; .5 INJECTION, POWDER, LYOPHILIZED, FOR SOLUTION INTRAVENOUS at 05:46

## 2018-01-16 RX ADMIN — ENOXAPARIN SODIUM 40 MILLIGRAM(S): 100 INJECTION SUBCUTANEOUS at 11:27

## 2018-01-16 NOTE — PROGRESS NOTE ADULT - ASSESSMENT
57F PMH schizophrenia, h/o TB w/ secondary R lung bronchiectasis presents for fever, SOB, worsening chronic cough. Admitted with acute on chronic hypercapnic respiratory failure on non invasive ventilation, PNA and elevated troponin. RVP positive for RSV.

## 2018-01-16 NOTE — PROGRESS NOTE ADULT - SUBJECTIVE AND OBJECTIVE BOX
Patient is a 57y old  Female who presents with a chief complaint of short of breath (09 Jan 2018 16:16)      INTERVAL HPI/OVERNIGHT EVENTS:  no overnight events. pt. sitting up in chair, c/o cough    MEDICATIONS  (STANDING):  ALBUTerol    90 MICROgram(s) HFA Inhaler 1 Puff(s) Inhalation every 4 hours  ALBUTerol/ipratropium for Nebulization 3 milliLiter(s) Nebulizer every 4 hours  ARIPiprazole 10 milliGRAM(s) Oral two times a day  aspirin enteric coated 81 milliGRAM(s) Oral daily  benztropine 2 milliGRAM(s) Oral two times a day  diVALproex  milliGRAM(s) Oral two times a day  enoxaparin Injectable 40 milliGRAM(s) SubCutaneous daily  influenza   Vaccine 0.5 milliLiter(s) IntraMuscular once  piperacillin/tazobactam IVPB. 3.375 Gram(s) IV Intermittent every 8 hours  sertraline 100 milliGRAM(s) Oral two times a day  tiotropium 18 MICROgram(s) Capsule 1 Capsule(s) Inhalation daily    MEDICATIONS  (PRN):  guaiFENesin   Syrup  (Sugar-Free) 100 milliGRAM(s) Oral every 6 hours PRN Cough      Allergies    No Known Allergies    Intolerances        REVIEW OF SYSTEMS:  difficult to assess as not a good historian    Vital Signs Last 24 Hrs  T(C): 36.8 (16 Jan 2018 15:44), Max: 37.1 (15 Tin 2018 19:27)  T(F): 98.3 (16 Jan 2018 15:44), Max: 98.8 (15 Tin 2018 19:27)  HR: 83 (16 Jan 2018 14:00) (71 - 87)  BP: 107/57 (16 Jan 2018 12:44) (95/60 - 144/61)  BP(mean): 69 (16 Jan 2018 12:44) (65 - 91)  RR: 20 (16 Jan 2018 12:44) (16 - 29)  SpO2: 97% (16 Jan 2018 14:00) (93% - 100%)    PHYSICAL EXAM:  GENERAL: NAD, sitting up in chair  HEAD:  Atraumatic, Normocephalic  EYES: EOMI, PERRLA, conjunctiva and sclera clear  ENMT: No tonsillar erythema, exudates, or enlargement; Moist mucous membranes, Good dentition, No lesions  NECK: Supple, No JVD, Normal thyroid  NERVOUS SYSTEM:  Alert & Oriented X3, Good concentration; Motor Strength 5/5 B/L upper and lower extremities; DTRs 2+ intact and symmetric  CHEST/LUNG: Clear to percussion bilaterally; No rales, rhonchi, wheezing, or rubs  HEART: Regular rate and rhythm; No murmurs, rubs, or gallops  ABDOMEN: Soft, Nontender, Nondistended; Bowel sounds present  EXTREMITIES:  2+ Peripheral Pulses, No clubbing, cyanosis, or edema  LYMPH: No lymphadenopathy noted  SKIN: No rashes or lesions    LABS:                        10.2   9.1   )-----------( 161      ( 16 Jan 2018 04:39 )             32.7     01-16    145  |  99  |  10  ----------------------------<  97  3.4<L>   |  41<H>  |  0.73    Ca    8.4<L>      16 Jan 2018 04:39  Phos  4.9     01-15  Mg     2.6     01-15    TPro  6.9  /  Alb  2.4<L>  /  TBili  0.4  /  DBili  x   /  AST  12<L>  /  ALT  13  /  AlkPhos  63  01-15        CAPILLARY BLOOD GLUCOSE          RADIOLOGY & ADDITIONAL TESTS:    Imaging Personally Reviewed:  [x ] YES  [ ] NO    Consultant(s) Notes Reviewed:  [x ] YES  [ ] NO    Care Discussed with Consultants/Other Providers [x ] YES  [ ] NO

## 2018-01-16 NOTE — PROGRESS NOTE ADULT - SUBJECTIVE AND OBJECTIVE BOX
Interval history :c/o shortness of breath and cough still + ,no fever     Antibiotics:  piperacillin/tazobactam IVPB. 3.375 Gram(s) IV Intermittent every 8 hours      Immunologic:  influenza   Vaccine 0.5 milliLiter(s) IntraMuscular once          Review of Systems--  Review of systems unable secondary to clinical condition.     Physical Examination--  Vital Signs: Tmax    General: Nontoxic-appearing Female in no acute distress.  HEENT: Pupils/EOM unable secondary to patient compliance. Neck: Not rigid. No sense of mass.  Nodes: None palpable.  Lungs: occasional Rhonchi bilaterally,  Heart: Regular rate and rhythm. No Murmur. No rub. No gallop. No palpable thrill.  Abdomen: Bowel sounds present and normoactive. Soft. Nondistended. Nontender. No sense of mass. No organomegaly.  Extremities: No cyanosis or clubbing. Trace edema.   Skin: Warm. Dry. Good turgor.   Psychiatric: Unable      Laboratory Studies--    WBC 9.1  cr 0.73    Culture Results:   No growth to date.

## 2018-01-16 NOTE — PROGRESS NOTE ADULT - SUBJECTIVE AND OBJECTIVE BOX
patient feeling better  Vital Signs Last 24 Hrs  T(C): 36.8 (16 Jan 2018 15:44), Max: 37.1 (15 Tin 2018 23:28)  T(F): 98.3 (16 Jan 2018 15:44), Max: 98.7 (15 Tin 2018 23:28)  HR: 82 (16 Jan 2018 18:32) (71 - 87)  BP: 104/63 (16 Jan 2018 17:39) (95/60 - 144/61)  BP(mean): 71 (16 Jan 2018 17:39) (65 - 83)  RR: 17 (16 Jan 2018 17:39) (16 - 22)  SpO2: 96% (16 Jan 2018 18:32) (91% - 100%)  Physical Exam  patient lying in bed in no respiratory distress  HEENT - EOMI, PERRLA ,neck supple , No JVD  lungs - bilateral ronchi   COR- [id number] , no murmer  Abd- soft, BS+, no tenderness, no guarding   ext- ecc neg, good pulses  Neuro - Alert , oriented X3   Skin- No rashes   MEDICATIONS  (STANDING):  ALBUTerol    90 MICROgram(s) HFA Inhaler 1 Puff(s) Inhalation every 4 hours  ALBUTerol/ipratropium for Nebulization 3 milliLiter(s) Nebulizer every 4 hours  ARIPiprazole 10 milliGRAM(s) Oral two times a day  aspirin enteric coated 81 milliGRAM(s) Oral daily  benztropine 2 milliGRAM(s) Oral two times a day  diVALproex  milliGRAM(s) Oral two times a day  enoxaparin Injectable 40 milliGRAM(s) SubCutaneous daily  influenza   Vaccine 0.5 milliLiter(s) IntraMuscular once  piperacillin/tazobactam IVPB. 3.375 Gram(s) IV Intermittent every 8 hours  sertraline 100 milliGRAM(s) Oral two times a day  tiotropium 18 MICROgram(s) Capsule 1 Capsule(s) Inhalation daily    MEDICATIONS  (PRN):  guaiFENesin   Syrup  (Sugar-Free) 100 milliGRAM(s) Oral every 6 hours PRN Cough                        10.2   9.1   )-----------( 161      ( 16 Jan 2018 04:39 )             32.7   01-16    145  |  99  |  10  ----------------------------<  97  3.4<L>   |  41<H>  |  0.73    Ca    8.4<L>      16 Jan 2018 04:39  Phos  4.9     01-15  Mg     2.6     01-15    TPro  6.9  /  Alb  2.4<L>  /  TBili  0.4  /  DBili  x   /  AST  12<L>  /  ALT  13  /  AlkPhos  63  01-15

## 2018-01-16 NOTE — PROGRESS NOTE ADULT - PROBLEM SELECTOR PLAN 1
2/2 RSV, gram neg pna, resolving, c/w bipap at night   fu pulm recs  c/w nebs, spiriva, robitussin, oxygen support

## 2018-01-16 NOTE — PROGRESS NOTE ADULT - ASSESSMENT
RSV infection   Pneumonia   chronic respiratory acidosis   bronchiectasis  chronic fibrosis (R) lung    Recommendations    continue zosyn   bronchodilators   oxygen via Nasal Cannula   BIPAP PRN   will f/u

## 2018-01-17 DIAGNOSIS — J18.9 PNEUMONIA, UNSPECIFIED ORGANISM: ICD-10-CM

## 2018-01-17 DIAGNOSIS — Z00.00 ENCOUNTER FOR GENERAL ADULT MEDICAL EXAMINATION WITHOUT ABNORMAL FINDINGS: ICD-10-CM

## 2018-01-17 LAB
ANION GAP SERPL CALC-SCNC: 4 MMOL/L — LOW (ref 5–17)
BASE EXCESS BLDA CALC-SCNC: 12.1 MMOL/L — HIGH (ref -2–2)
BASE EXCESS BLDA CALC-SCNC: 13.7 MMOL/L — HIGH (ref -2–2)
BASE EXCESS BLDA CALC-SCNC: 14.2 MMOL/L — HIGH (ref -2–2)
BASOPHILS # BLD AUTO: 0 K/UL — SIGNIFICANT CHANGE UP (ref 0–0.2)
BASOPHILS NFR BLD AUTO: 0.5 % — SIGNIFICANT CHANGE UP (ref 0–2)
BLOOD GAS COMMENTS: SIGNIFICANT CHANGE UP
BLOOD GAS SOURCE: SIGNIFICANT CHANGE UP
BUN SERPL-MCNC: 10 MG/DL — SIGNIFICANT CHANGE UP (ref 7–23)
CALCIUM SERPL-MCNC: 8.8 MG/DL — SIGNIFICANT CHANGE UP (ref 8.5–10.1)
CHLORIDE SERPL-SCNC: 100 MMOL/L — SIGNIFICANT CHANGE UP (ref 96–108)
CO2 SERPL-SCNC: 38 MMOL/L — HIGH (ref 22–31)
CREAT SERPL-MCNC: 0.83 MG/DL — SIGNIFICANT CHANGE UP (ref 0.5–1.3)
EOSINOPHIL # BLD AUTO: 0.2 K/UL — SIGNIFICANT CHANGE UP (ref 0–0.5)
EOSINOPHIL NFR BLD AUTO: 2.1 % — SIGNIFICANT CHANGE UP (ref 0–6)
FERRITIN SERPL-MCNC: 64 NG/ML — SIGNIFICANT CHANGE UP (ref 15–150)
GLUCOSE BLDC GLUCOMTR-MCNC: 88 MG/DL — SIGNIFICANT CHANGE UP (ref 70–99)
GLUCOSE SERPL-MCNC: 92 MG/DL — SIGNIFICANT CHANGE UP (ref 70–99)
HCO3 BLDA-SCNC: 38 MMOL/L — HIGH (ref 21–29)
HCO3 BLDA-SCNC: 40 MMOL/L — HIGH (ref 21–29)
HCO3 BLDA-SCNC: 42 MMOL/L — HIGH (ref 21–29)
HCT VFR BLD CALC: 36.4 % — SIGNIFICANT CHANGE UP (ref 34.5–45)
HGB BLD-MCNC: 11.1 G/DL — LOW (ref 11.5–15.5)
HOROWITZ INDEX BLDA+IHG-RTO: 21 — SIGNIFICANT CHANGE UP
HOROWITZ INDEX BLDA+IHG-RTO: 35 — SIGNIFICANT CHANGE UP
HOROWITZ INDEX BLDA+IHG-RTO: 36 — SIGNIFICANT CHANGE UP
IRON SATN MFR SERPL: 14 % — SIGNIFICANT CHANGE UP (ref 14–50)
IRON SATN MFR SERPL: 46 UG/DL — SIGNIFICANT CHANGE UP (ref 30–160)
LYMPHOCYTES # BLD AUTO: 1.8 K/UL — SIGNIFICANT CHANGE UP (ref 1–3.3)
LYMPHOCYTES # BLD AUTO: 18.6 % — SIGNIFICANT CHANGE UP (ref 13–44)
MAGNESIUM SERPL-MCNC: 2 MG/DL — SIGNIFICANT CHANGE UP (ref 1.6–2.6)
MCHC RBC-ENTMCNC: 29.6 PG — SIGNIFICANT CHANGE UP (ref 27–34)
MCHC RBC-ENTMCNC: 30.6 GM/DL — LOW (ref 32–36)
MCV RBC AUTO: 96.6 FL — SIGNIFICANT CHANGE UP (ref 80–100)
MONOCYTES # BLD AUTO: 0.8 K/UL — SIGNIFICANT CHANGE UP (ref 0–0.9)
MONOCYTES NFR BLD AUTO: 8 % — SIGNIFICANT CHANGE UP (ref 2–14)
NEUTROPHILS # BLD AUTO: 6.8 K/UL — SIGNIFICANT CHANGE UP (ref 1.8–7.4)
NEUTROPHILS NFR BLD AUTO: 70.8 % — SIGNIFICANT CHANGE UP (ref 43–77)
PCO2 BLDA: 45 MMHG — SIGNIFICANT CHANGE UP (ref 32–46)
PCO2 BLDA: 73 MMHG — CRITICAL HIGH (ref 32–46)
PCO2 BLDA: 74 MMHG — CRITICAL HIGH (ref 32–46)
PH BLD: 7.35 — SIGNIFICANT CHANGE UP (ref 7.35–7.45)
PH BLD: 7.37 — SIGNIFICANT CHANGE UP (ref 7.35–7.45)
PH BLD: 7.54 — HIGH (ref 7.35–7.45)
PLATELET # BLD AUTO: 209 K/UL — SIGNIFICANT CHANGE UP (ref 150–400)
PO2 BLDA: 106 MMHG — SIGNIFICANT CHANGE UP (ref 74–108)
PO2 BLDA: 148 MMHG — HIGH (ref 74–108)
PO2 BLDA: 170 MMHG — HIGH (ref 74–108)
POTASSIUM SERPL-MCNC: 4.2 MMOL/L — SIGNIFICANT CHANGE UP (ref 3.5–5.3)
POTASSIUM SERPL-SCNC: 4.2 MMOL/L — SIGNIFICANT CHANGE UP (ref 3.5–5.3)
RBC # BLD: 3.76 M/UL — LOW (ref 3.8–5.2)
RBC # FLD: 14.5 % — SIGNIFICANT CHANGE UP (ref 11–15)
SAO2 % BLDA: 97 % — HIGH (ref 92–96)
SAO2 % BLDA: 98 % — HIGH (ref 92–96)
SAO2 % BLDA: 98 % — HIGH (ref 92–96)
SODIUM SERPL-SCNC: 142 MMOL/L — SIGNIFICANT CHANGE UP (ref 135–145)
TIBC SERPL-MCNC: 318 UG/DL — SIGNIFICANT CHANGE UP (ref 220–430)
UIBC SERPL-MCNC: 272 UG/DL — SIGNIFICANT CHANGE UP (ref 110–370)
WBC # BLD: 9.6 K/UL — SIGNIFICANT CHANGE UP (ref 3.8–10.5)
WBC # FLD AUTO: 9.6 K/UL — SIGNIFICANT CHANGE UP (ref 3.8–10.5)

## 2018-01-17 PROCEDURE — 71045 X-RAY EXAM CHEST 1 VIEW: CPT | Mod: 26,77

## 2018-01-17 PROCEDURE — 99233 SBSQ HOSP IP/OBS HIGH 50: CPT

## 2018-01-17 PROCEDURE — 71045 X-RAY EXAM CHEST 1 VIEW: CPT | Mod: 26

## 2018-01-17 RX ADMIN — Medication 3 MILLILITER(S): at 15:31

## 2018-01-17 RX ADMIN — Medication 3 MILLILITER(S): at 18:41

## 2018-01-17 RX ADMIN — SERTRALINE 100 MILLIGRAM(S): 25 TABLET, FILM COATED ORAL at 17:31

## 2018-01-17 RX ADMIN — PIPERACILLIN AND TAZOBACTAM 12.5 GRAM(S): 4; .5 INJECTION, POWDER, LYOPHILIZED, FOR SOLUTION INTRAVENOUS at 22:36

## 2018-01-17 RX ADMIN — Medication 2 MILLIGRAM(S): at 05:39

## 2018-01-17 RX ADMIN — Medication 3 MILLILITER(S): at 01:29

## 2018-01-17 RX ADMIN — DIVALPROEX SODIUM 500 MILLIGRAM(S): 500 TABLET, DELAYED RELEASE ORAL at 05:39

## 2018-01-17 RX ADMIN — ARIPIPRAZOLE 10 MILLIGRAM(S): 15 TABLET ORAL at 05:38

## 2018-01-17 RX ADMIN — ARIPIPRAZOLE 10 MILLIGRAM(S): 15 TABLET ORAL at 17:31

## 2018-01-17 RX ADMIN — PIPERACILLIN AND TAZOBACTAM 12.5 GRAM(S): 4; .5 INJECTION, POWDER, LYOPHILIZED, FOR SOLUTION INTRAVENOUS at 14:17

## 2018-01-17 RX ADMIN — Medication 81 MILLIGRAM(S): at 12:44

## 2018-01-17 RX ADMIN — PIPERACILLIN AND TAZOBACTAM 12.5 GRAM(S): 4; .5 INJECTION, POWDER, LYOPHILIZED, FOR SOLUTION INTRAVENOUS at 05:38

## 2018-01-17 RX ADMIN — Medication 3 MILLILITER(S): at 11:14

## 2018-01-17 RX ADMIN — Medication 40 MILLIGRAM(S): at 22:36

## 2018-01-17 RX ADMIN — ENOXAPARIN SODIUM 40 MILLIGRAM(S): 100 INJECTION SUBCUTANEOUS at 12:44

## 2018-01-17 RX ADMIN — SERTRALINE 100 MILLIGRAM(S): 25 TABLET, FILM COATED ORAL at 05:39

## 2018-01-17 RX ADMIN — Medication 2 MILLIGRAM(S): at 17:31

## 2018-01-17 RX ADMIN — Medication 3 MILLILITER(S): at 05:57

## 2018-01-17 RX ADMIN — DIVALPROEX SODIUM 500 MILLIGRAM(S): 500 TABLET, DELAYED RELEASE ORAL at 17:31

## 2018-01-17 RX ADMIN — Medication 3 MILLILITER(S): at 21:47

## 2018-01-17 NOTE — PROGRESS NOTE ADULT - ASSESSMENT
57 year old female with a PMHx notable for schizophrenia, h/o TB w/ secondary R lung bronchiectasis presents for fever, SOB, worsening chronic cough. Admitted with acute on chronic hypercapnic respiratory failure on non invasive ventilation, PNA and elevated troponin. RVP positive for RSV.

## 2018-01-17 NOTE — PROGRESS NOTE ADULT - ASSESSMENT
chronic fibrosis (R) lung   bronchiectasis   Pneumonia   hypercapnic Respiratory Failure     Recommendations    place back on BIPAP   change BIPAP settings   repeat ABG  continue bronchodilators   start on Solumedrol   continue bronchodilators   continue Antibiotics   will f/u

## 2018-01-17 NOTE — CHART NOTE - NSCHARTNOTEFT_GEN_A_CORE
While doing interdisciplinary rounds asked by RN to assess patient who is currently not responsive. Patient assessed at bedside with RN. VSSS, fingerstick 88. Patient obtunded, responsive to sternal rub, but is not answering questions or following basic commands. Patient with pneumonia, sepsis, and history of hypercarbia. Chest auscultated found to have bilateral coarse breath sounds with rhonchi. STAT ABG ordered and resulted:     Blood Gas Profile - Arterial (01.17.18 @ 10:30)    Blood Gas Source: Arterial    Blood Gas Comments: Pt on 4L n/c. Lt rad. Shane's  test +.    pH, Blood: 7.35    pCO2, Arterial: 73 mmHg    pO2, Arterial: 148 mmHg    HCO3, Arterial: 40 mmol/L    Base Excess, Arterial: 12.1 mmol/L    Oxygen Saturation, Arterial: 98 %    FIO2, Arterial: 36.0      57 year old female admitted with pneumonia, hypercarbia, and sepsis now with lethargy secondary to worsening hypercarbia.    - Medical team contacted to assess patient.   - Respiratory paged to apply BIPAP/CPAP. Recommend continuing BIPAP during the day.  - suggest repeating ABG later today to assess improvement  - continue medical management  - recommend ID and pulmonary follow up

## 2018-01-17 NOTE — PROGRESS NOTE ADULT - SUBJECTIVE AND OBJECTIVE BOX
patient was lethargic today , ABG showed increase PCO2 and she was placed on BIPAP . Now on Nasal Cannula still lethargic    Vital Signs Last 24 Hrs  T(C): 37.2 (17 Jan 2018 17:00), Max: 37.2 (17 Jan 2018 16:31)  T(F): 99 (17 Jan 2018 17:00), Max: 99 (17 Jan 2018 16:31)  HR: 76 (17 Jan 2018 18:35) (68 - 95)  BP: 110/65 (17 Jan 2018 17:00) (99/55 - 133/68)  BP(mean): --  RR: 20 (17 Jan 2018 17:00) (15 - 20)  SpO2: 96% (17 Jan 2018 18:35) (96% - 99%)  Physical Exam    HEENT - EOMI, PERRLA ,neck supple , No JVD  lungs - decreased BS (R) bilateral ronchi    COR- [id number] , no murmer  Abd- soft, BS+, no tenderness, no guarding   ext- ecc neg, good pulses  Neuro - Alert , oriented X3   Skin- No rashes   MEDICATIONS  (STANDING):  ALBUTerol    90 MICROgram(s) HFA Inhaler 1 Puff(s) Inhalation every 4 hours  ALBUTerol/ipratropium for Nebulization 3 milliLiter(s) Nebulizer every 4 hours  ARIPiprazole 10 milliGRAM(s) Oral two times a day  aspirin enteric coated 81 milliGRAM(s) Oral daily  benztropine 2 milliGRAM(s) Oral two times a day  diVALproex  milliGRAM(s) Oral two times a day  enoxaparin Injectable 40 milliGRAM(s) SubCutaneous daily  influenza   Vaccine 0.5 milliLiter(s) IntraMuscular once  methylPREDNISolone sodium succinate Injectable 40 milliGRAM(s) IV Push every 8 hours  piperacillin/tazobactam IVPB. 3.375 Gram(s) IV Intermittent every 8 hours  sertraline 100 milliGRAM(s) Oral two times a day  tiotropium 18 MICROgram(s) Capsule 1 Capsule(s) Inhalation daily    MEDICATIONS  (PRN):  guaiFENesin   Syrup  (Sugar-Free) 100 milliGRAM(s) Oral every 6 hours PRN Cough  ABG - ( 17 Jan 2018 16:27 )  pH: x     /  pCO2: 45    /  pO2: 170   / HCO3: 38    / Base Excess: 13.7  /  SaO2: 98                                    11.1   9.6   )-----------( 209      ( 17 Jan 2018 06:38 )             36.4   01-17    142  |  100  |  10  ----------------------------<  92  4.2   |  38<H>  |  0.83    Ca    8.8      17 Jan 2018 06:38  Mg     2.0     01-17

## 2018-01-17 NOTE — CHART NOTE - NSCHARTNOTEFT_GEN_A_CORE
Sepsis ruled - in, POA Pt. with fever 102.3, , RR 22 likely 2/2 RSV and gram neg pneumonia. Slowly resolving but still with resp issues and lethargy (acute metabolic encephalopathy today.

## 2018-01-17 NOTE — PROGRESS NOTE ADULT - PROBLEM SELECTOR PLAN 3
day 8 of zoysn   recheck procalcitonin again   may d/c zoysn very soon day 8 of zosyn   recheck procalcitonin again   may d/c zosyn very soon(not d/c ing today as requiring BIPAP again and more lethargic

## 2018-01-17 NOTE — PROGRESS NOTE ADULT - SUBJECTIVE AND OBJECTIVE BOX
HPI:  56yo female with pmh schizophrenia, h/o chronic lung infection, h/o TB and untreated for a long time with rt lung collapse, presents with 10 days of not feeling well, worsening of chronic cough and sob. Pt at baseline mental status.  Pt lives with sister.  Per family, pt also with chronic diarrhea for years, unchanged. Pt baseline hypoxic.     ED course: + RSV & PNA     1/17 SUBJECTIVE & OBJECTIVE: Pt seen and examined at bedside.   Chart reviewed.  Events overnight noted.  Pt lethargic; although she briefly awakens to voice.  ABG noted with worsening hypercarbia.  Placed on BIPAP     PHYSICAL EXAM:  T(C): 36.6 (01-17-18 @ 05:44), Max: 37.1 (01-16-18 @ 19:30)  HR: 80 (01-17-18 @ 06:31) (74 - 96)  BP: 133/68 (01-17-18 @ 05:44) (104/63 - 133/68)  RR: 16 (01-17-18 @ 05:44) (15 - 20)  SpO2: 97% (01-17-18 @ 06:31) (91% - 98%)    GENERAL: Lethargic, well-groomed, well-developed  HEAD:  Atraumatic, Normocephalic  EYES: EOMI, PERRLA, conjunctiva and sclera clear  ENMT: Moist mucous membranes  NECK: Supple, No JVD  NERVOUS SYSTEM:  Alert & Oriented X3, Motor Strength 5/5 B/L upper and lower extremities; DTRs 2+ intact and symmetric  CHEST/LUNG: Clear to auscultation bilaterally; No rales, rhonchi, wheezing, or rubs  HEART: Regular rate and rhythm; No murmurs, rubs, or gallops  ABDOMEN: Soft, Nontender, Nondistended; Bowel sounds present  EXTREMITIES:  2+ Peripheral Pulses, No clubbing, cyanosis, or edema        MEDICATIONS  (STANDING):  ALBUTerol    90 MICROgram(s) HFA Inhaler 1 Puff(s) Inhalation every 4 hours  ALBUTerol/ipratropium for Nebulization 3 milliLiter(s) Nebulizer every 4 hours  ARIPiprazole 10 milliGRAM(s) Oral two times a day  aspirin enteric coated 81 milliGRAM(s) Oral daily  benztropine 2 milliGRAM(s) Oral two times a day  diVALproex  milliGRAM(s) Oral two times a day  enoxaparin Injectable 40 milliGRAM(s) SubCutaneous daily  influenza   Vaccine 0.5 milliLiter(s) IntraMuscular once  piperacillin/tazobactam IVPB. 3.375 Gram(s) IV Intermittent every 8 hours  sertraline 100 milliGRAM(s) Oral two times a day  tiotropium 18 MICROgram(s) Capsule 1 Capsule(s) Inhalation daily    MEDICATIONS  (PRN):  guaiFENesin   Syrup  (Sugar-Free) 100 milliGRAM(s) Oral every 6 hours PRN Cough      LABS:                        11.1   9.6   )-----------( 209      ( 17 Jan 2018 06:38 )             36.4     01-    142  |  100  |  10  ----------------------------<  92  4.2   |  38<H>  |  0.83    Calcium    8.8      17 Jan 2018 06:38  Magnesium     2.0     01-17      Magnesium, Serum: 2.0 mg/dL (01-17 @ 06:38)    POCT Blood Glucose.: 88 mg/dL (17 Jan 2018 09:54)    POCT Blood Glucose.: 88 mg/dL (17 Jan 2018 09:54)      POCT Blood Glucose.: 88 mg/dL (17 Jan 2018 09:54)    ABG - ( 17 Jan 2018 10:30 )  pH: x     /  pCO2: 73    /  pO2: 148   / HCO3: 40    / Base Excess: 12.1  /  SaO2: 98              DVT/GI prophylaxsis   Discussed with patient @ bedside HPI:  58yo female with pmh schizophrenia, h/o chronic lung infection, h/o TB and untreated for a long time with rt lung collapse, presents with 10 days of not feeling well, worsening of chronic cough and sob. Pt at baseline mental status.  Pt lives with sister.  Per family, pt also with chronic diarrhea for years, unchanged. Pt baseline hypoxic.     ED course: + RSV & PNA     1/17 SUBJECTIVE & OBJECTIVE: Pt seen and examined at bedside.   Chart reviewed.  Events this AM noted.  Pt lethargic; although she briefly awakens to voice.  ABG noted with worsening hypercarbia.  Placed on BIPAP     PHYSICAL EXAM:  T(C): 36.6 (01-17-18 @ 05:44), Max: 37.1 (01-16-18 @ 19:30)  HR: 80 (01-17-18 @ 06:31) (74 - 96)  BP: 133/68 (01-17-18 @ 05:44) (104/63 - 133/68)  RR: 16 (01-17-18 @ 05:44) (15 - 20)  SpO2: 97% (01-17-18 @ 06:31) (91% - 98%)    GENERAL: Lethargic, well-groomed, well-developed  HEAD:  Atraumatic, Normocephalic  EYES: EOMI, PERRLA, conjunctiva and sclera clear  ENMT: Moist mucous membranes  NECK: Supple, No JVD  NERVOUS SYSTEM:  Alert & Oriented X3, Motor Strength 5/5 B/L upper and lower extremities; DTRs 2+ intact and symmetric  CHEST/LUNG: Clear to auscultation bilaterally; No rales, rhonchi, wheezing, or rubs  HEART: Regular rate and rhythm; No murmurs, rubs, or gallops  ABDOMEN: Soft, Nontender, Nondistended; Bowel sounds present  EXTREMITIES:  2+ Peripheral Pulses, No clubbing, cyanosis, or edema        MEDICATIONS  (STANDING):  ALBUTerol    90 MICROgram(s) HFA Inhaler 1 Puff(s) Inhalation every 4 hours  ALBUTerol/ipratropium for Nebulization 3 milliLiter(s) Nebulizer every 4 hours  ARIPiprazole 10 milliGRAM(s) Oral two times a day  aspirin enteric coated 81 milliGRAM(s) Oral daily  benztropine 2 milliGRAM(s) Oral two times a day  diVALproex  milliGRAM(s) Oral two times a day  enoxaparin Injectable 40 milliGRAM(s) SubCutaneous daily  influenza   Vaccine 0.5 milliLiter(s) IntraMuscular once  piperacillin/tazobactam IVPB. 3.375 Gram(s) IV Intermittent every 8 hours  sertraline 100 milliGRAM(s) Oral two times a day  tiotropium 18 MICROgram(s) Capsule 1 Capsule(s) Inhalation daily    MEDICATIONS  (PRN):  guaiFENesin   Syrup  (Sugar-Free) 100 milliGRAM(s) Oral every 6 hours PRN Cough      LABS:                        11.1   9.6   )-----------( 209      ( 17 Jan 2018 06:38 )             36.4     01-    142  |  100  |  10  ----------------------------<  92  4.2   |  38<H>  |  0.83    Calcium    8.8      17 Jan 2018 06:38  Magnesium     2.0     01-17      Magnesium, Serum: 2.0 mg/dL (01-17 @ 06:38)    POCT Blood Glucose.: 88 mg/dL (17 Jan 2018 09:54)    POCT Blood Glucose.: 88 mg/dL (17 Jan 2018 09:54)      POCT Blood Glucose.: 88 mg/dL (17 Jan 2018 09:54)    ABG - ( 17 Jan 2018 10:30 )  pH: x     /  pCO2: 73    /  pO2: 148   / HCO3: 40    / Base Excess: 12.1  /  SaO2: 98              DVT/GI prophylaxsis   Discussed with patient @ bedside

## 2018-01-17 NOTE — PROGRESS NOTE ADULT - SUBJECTIVE AND OBJECTIVE BOX
Patient is a 57y old  Female who presents with a chief complaint of short of breath (09 Jan 2018 16:16)      INTERVAL HPI / OVERNIGHT EVENTS:no new event,more lethargic though     MEDICATIONS  (STANDING):  ALBUTerol    90 MICROgram(s) HFA Inhaler 1 Puff(s) Inhalation every 4 hours  ALBUTerol/ipratropium for Nebulization 3 milliLiter(s) Nebulizer every 4 hours  ARIPiprazole 10 milliGRAM(s) Oral two times a day  aspirin enteric coated 81 milliGRAM(s) Oral daily  benztropine 2 milliGRAM(s) Oral two times a day  diVALproex  milliGRAM(s) Oral two times a day  enoxaparin Injectable 40 milliGRAM(s) SubCutaneous daily  influenza   Vaccine 0.5 milliLiter(s) IntraMuscular once  piperacillin/tazobactam IVPB. 3.375 Gram(s) IV Intermittent every 8 hours  sertraline 100 milliGRAM(s) Oral two times a day  tiotropium 18 MICROgram(s) Capsule 1 Capsule(s) Inhalation daily    MEDICATIONS  (PRN):  guaiFENesin   Syrup  (Sugar-Free) 100 milliGRAM(s) Oral every 6 hours PRN Cough      Vital Signs Last 24 Hrs  T(C): 36.6 (17 Jan 2018 05:44), Max: 37.1 (16 Jan 2018 19:30)  T(F): 97.9 (17 Jan 2018 05:44), Max: 98.8 (16 Jan 2018 19:30)  HR: 78 (17 Jan 2018 15:01) (74 - 96)  BP: 108/67 (17 Jan 2018 15:01) (104/63 - 133/68)  BP(mean): 71 (16 Jan 2018 17:39) (71 - 71)  RR: 18 (17 Jan 2018 15:01) (15 - 18)  SpO2: 97% (17 Jan 2018 15:01) (91% - 98%)    PHYSICAL EXAM:  General :NAD  Constitutional:  well-groomed, well-developed  Respiratory: rhonchi b/l  Cardiovascular: S1 and S2, RRR, no M/G/R  Gastrointestinal: BS+, soft, NT/ND  Extremities: No peripheral edema  Vascular: 2+ peripheral pulses  Skin: No rashes      LABS:                        11.1   9.6   )-----------( 209      ( 17 Jan 2018 06:38 )             36.4     01-17    142  |  100  |  10  ----------------------------<  92  4.2   |  38<H>  |  0.83    Ca    8.8      17 Jan 2018 06:38  Mg     2.0     01-17            MICROBIOLOGY:  RECENT CULTURES:        RADIOLOGY & ADDITIONAL STUDIES: Patient is a 57y old  Female who presents with a chief complaint of short of breath (09 Jan 2018 16:16)      INTERVAL HPI / OVERNIGHT EVENTS:restarted on BIPAP ,more lethargic though     MEDICATIONS  (STANDING):  ALBUTerol    90 MICROgram(s) HFA Inhaler 1 Puff(s) Inhalation every 4 hours  ALBUTerol/ipratropium for Nebulization 3 milliLiter(s) Nebulizer every 4 hours  ARIPiprazole 10 milliGRAM(s) Oral two times a day  aspirin enteric coated 81 milliGRAM(s) Oral daily  benztropine 2 milliGRAM(s) Oral two times a day  diVALproex  milliGRAM(s) Oral two times a day  enoxaparin Injectable 40 milliGRAM(s) SubCutaneous daily  influenza   Vaccine 0.5 milliLiter(s) IntraMuscular once  piperacillin/tazobactam IVPB. 3.375 Gram(s) IV Intermittent every 8 hours  sertraline 100 milliGRAM(s) Oral two times a day  tiotropium 18 MICROgram(s) Capsule 1 Capsule(s) Inhalation daily    MEDICATIONS  (PRN):  guaiFENesin   Syrup  (Sugar-Free) 100 milliGRAM(s) Oral every 6 hours PRN Cough      Vital Signs Last 24 Hrs  T(C): 36.6 (17 Jan 2018 05:44), Max: 37.1 (16 Jan 2018 19:30)  T(F): 97.9 (17 Jan 2018 05:44), Max: 98.8 (16 Jan 2018 19:30)  HR: 78 (17 Jan 2018 15:01) (74 - 96)  BP: 108/67 (17 Jan 2018 15:01) (104/63 - 133/68)  BP(mean): 71 (16 Jan 2018 17:39) (71 - 71)  RR: 18 (17 Jan 2018 15:01) (15 - 18)  SpO2: 97% (17 Jan 2018 15:01) (91% - 98%)    PHYSICAL EXAM:  General :NAD  Constitutional:  well-groomed, well-developed  Respiratory: rhonchi b/l  Cardiovascular: S1 and S2, RRR, no M/G/R  Gastrointestinal: BS+, soft, NT/ND  Extremities: No peripheral edema  Vascular: 2+ peripheral pulses  Skin: No rashes      LABS:                        11.1   9.6   )-----------( 209      ( 17 Jan 2018 06:38 )             36.4     01-17    142  |  100  |  10  ----------------------------<  92  4.2   |  38<H>  |  0.83    Ca    8.8      17 Jan 2018 06:38  Mg     2.0     01-17            MICROBIOLOGY:  RECENT CULTURES:        RADIOLOGY & ADDITIONAL STUDIES:

## 2018-01-17 NOTE — PROGRESS NOTE ADULT - PROBLEM SELECTOR PLAN 1
*1/17 Events this AM noted.  Pt lethargic; although she awakens briefly to voice.  VSSS, fingerstick 88  STAT ABG ordered and resulted:    pH, Blood: 7.35    pCO2, Arterial: 73 mmHg    pO2, Arterial: 148 mmHg    HCO3, Arterial: 40 mmol/L  Now with lethargy secondary to worsening hypercarbia.  - Respiratory paged to apply BIPAP/CPAP. Recommend continuing BIPAP during the day.  - suggest repeating ABG later today to assess for improvement

## 2018-01-18 LAB
BASE EXCESS BLDA CALC-SCNC: 10.6 MMOL/L — HIGH (ref -2–2)
BLOOD GAS COMMENTS: SIGNIFICANT CHANGE UP
BLOOD GAS COMMENTS: SIGNIFICANT CHANGE UP
BLOOD GAS SOURCE: SIGNIFICANT CHANGE UP
HCO3 BLDA-SCNC: 37 MMOL/L — HIGH (ref 21–29)
HOROWITZ INDEX BLDA+IHG-RTO: 35 — SIGNIFICANT CHANGE UP
PCO2 BLDA: 63 MMHG — HIGH (ref 32–46)
PH BLD: 7.39 — SIGNIFICANT CHANGE UP (ref 7.35–7.45)
PO2 BLDA: 97 MMHG — SIGNIFICANT CHANGE UP (ref 74–108)
PROCALCITONIN SERPL-MCNC: <0.05 NG/ML — SIGNIFICANT CHANGE UP (ref 0–0.04)
SAO2 % BLDA: 96 % — SIGNIFICANT CHANGE UP (ref 92–96)

## 2018-01-18 PROCEDURE — 99233 SBSQ HOSP IP/OBS HIGH 50: CPT

## 2018-01-18 RX ORDER — TIOTROPIUM BROMIDE 18 UG/1
1 CAPSULE ORAL; RESPIRATORY (INHALATION) DAILY
Qty: 0 | Refills: 0 | Status: DISCONTINUED | OUTPATIENT
Start: 2018-01-18 | End: 2018-01-20

## 2018-01-18 RX ORDER — ALBUTEROL 90 UG/1
1 AEROSOL, METERED ORAL EVERY 4 HOURS
Qty: 0 | Refills: 0 | Status: DISCONTINUED | OUTPATIENT
Start: 2018-01-18 | End: 2018-01-20

## 2018-01-18 RX ADMIN — Medication 81 MILLIGRAM(S): at 12:06

## 2018-01-18 RX ADMIN — PIPERACILLIN AND TAZOBACTAM 12.5 GRAM(S): 4; .5 INJECTION, POWDER, LYOPHILIZED, FOR SOLUTION INTRAVENOUS at 15:18

## 2018-01-18 RX ADMIN — Medication 40 MILLIGRAM(S): at 05:26

## 2018-01-18 RX ADMIN — Medication 3 MILLILITER(S): at 06:07

## 2018-01-18 RX ADMIN — Medication 2 MILLIGRAM(S): at 05:26

## 2018-01-18 RX ADMIN — Medication 3 MILLILITER(S): at 01:15

## 2018-01-18 RX ADMIN — ARIPIPRAZOLE 10 MILLIGRAM(S): 15 TABLET ORAL at 18:12

## 2018-01-18 RX ADMIN — ENOXAPARIN SODIUM 40 MILLIGRAM(S): 100 INJECTION SUBCUTANEOUS at 12:06

## 2018-01-18 RX ADMIN — ALBUTEROL 1 PUFF(S): 90 AEROSOL, METERED ORAL at 22:45

## 2018-01-18 RX ADMIN — SERTRALINE 100 MILLIGRAM(S): 25 TABLET, FILM COATED ORAL at 05:26

## 2018-01-18 RX ADMIN — TIOTROPIUM BROMIDE 1 CAPSULE(S): 18 CAPSULE ORAL; RESPIRATORY (INHALATION) at 15:19

## 2018-01-18 RX ADMIN — PIPERACILLIN AND TAZOBACTAM 12.5 GRAM(S): 4; .5 INJECTION, POWDER, LYOPHILIZED, FOR SOLUTION INTRAVENOUS at 05:26

## 2018-01-18 RX ADMIN — DIVALPROEX SODIUM 500 MILLIGRAM(S): 500 TABLET, DELAYED RELEASE ORAL at 18:12

## 2018-01-18 RX ADMIN — ALBUTEROL 1 PUFF(S): 90 AEROSOL, METERED ORAL at 15:17

## 2018-01-18 RX ADMIN — Medication 40 MILLIGRAM(S): at 22:40

## 2018-01-18 RX ADMIN — SERTRALINE 100 MILLIGRAM(S): 25 TABLET, FILM COATED ORAL at 18:12

## 2018-01-18 RX ADMIN — Medication 40 MILLIGRAM(S): at 15:18

## 2018-01-18 RX ADMIN — Medication 2 MILLIGRAM(S): at 18:12

## 2018-01-18 RX ADMIN — ALBUTEROL 1 PUFF(S): 90 AEROSOL, METERED ORAL at 18:06

## 2018-01-18 RX ADMIN — PIPERACILLIN AND TAZOBACTAM 12.5 GRAM(S): 4; .5 INJECTION, POWDER, LYOPHILIZED, FOR SOLUTION INTRAVENOUS at 22:40

## 2018-01-18 RX ADMIN — ARIPIPRAZOLE 10 MILLIGRAM(S): 15 TABLET ORAL at 05:26

## 2018-01-18 RX ADMIN — DIVALPROEX SODIUM 500 MILLIGRAM(S): 500 TABLET, DELAYED RELEASE ORAL at 05:26

## 2018-01-18 NOTE — PROGRESS NOTE ADULT - SUBJECTIVE AND OBJECTIVE BOX
Vital Signs Last 24 Hrs  T(C): 36.7 (18 Jan 2018 17:55), Max: 37.7 (18 Jan 2018 12:08)  T(F): 98 (18 Jan 2018 17:55), Max: 99.8 (18 Jan 2018 12:08)  HR: 86 (18 Jan 2018 18:07) (61 - 88)  BP: 137/59 (18 Jan 2018 17:55) (128/64 - 150/90)  BP(mean): --  RR: 21 (18 Jan 2018 17:55) (18 - 21)  SpO2: 95% (18 Jan 2018 18:07) (90% - 99%)  Physical Exam  patient awake today , does not appear to be in respiratory distress , but complaints she is not feeling well  HEENT - EOMI, PERRLA ,neck supple , No JVD  lungs - bilateral diffuse ronchi  COR- [id number] , no murmer  Abd- soft, BS+, no tenderness, no guarding   ext- ecc neg, good pulses  Neuro - Alert , oriented X3   Skin- No rashes   MEDICATIONS  (STANDING):  ALBUTerol    90 MICROgram(s) HFA Inhaler 1 Puff(s) Inhalation every 4 hours  ARIPiprazole 10 milliGRAM(s) Oral two times a day  aspirin enteric coated 81 milliGRAM(s) Oral daily  benztropine 2 milliGRAM(s) Oral two times a day  diVALproex  milliGRAM(s) Oral two times a day  enoxaparin Injectable 40 milliGRAM(s) SubCutaneous daily  influenza   Vaccine 0.5 milliLiter(s) IntraMuscular once  methylPREDNISolone sodium succinate Injectable 40 milliGRAM(s) IV Push every 8 hours  piperacillin/tazobactam IVPB. 3.375 Gram(s) IV Intermittent every 8 hours  sertraline 100 milliGRAM(s) Oral two times a day  tiotropium 18 MICROgram(s) Capsule 1 Capsule(s) Inhalation daily    MEDICATIONS  (PRN):  guaiFENesin   Syrup  (Sugar-Free) 100 milliGRAM(s) Oral every 6 hours PRN Cough  ABG - ( 18 Jan 2018 09:21 )  pH: x     /  pCO2: 63    /  pO2: 97    / HCO3: 37    / Base Excess: 10.6  /  SaO2: 96                                    11.1   9.6   )-----------( 209      ( 17 Jan 2018 06:38 )             36.4   01-17    142  |  100  |  10  ----------------------------<  92  4.2   |  38<H>  |  0.83    Ca    8.8      17 Jan 2018 06:38  Mg     2.0     01-17

## 2018-01-18 NOTE — PROGRESS NOTE ADULT - PROBLEM SELECTOR PLAN 3
In the setting of chronic fibrotic lung disease with cystic lesions, CXR shows right   lung  volume loss   day 9 of zosyn.   recheck procalcitonin again   may d/c zosyn very soon(not d/c ing today as requiring BIPAP again and more lethargic

## 2018-01-18 NOTE — PROGRESS NOTE ADULT - ASSESSMENT
57 year old female with a PMHx notable for schizophrenia, h/o TB w/ secondary R lung bronchiectasis presents for fever, SOB, worsening chronic cough. Admitted with acute on chronic hypercapnic respiratory failure  PNA and elevated troponin. RVP positive for RSV.

## 2018-01-18 NOTE — PROGRESS NOTE ADULT - SUBJECTIVE AND OBJECTIVE BOX
HPI:  56yo female with pmh schizophrenia, h/o chronic lung infection, h/o TB and untreated for a long time with rt lung collapse, presents with 10 days of not feeling well, worsening of chronic cough and sob. Pt at baseline mental status.  Pt lives with sister.  Per family, pt also with chronic diarrhea for years, unchanged. Pt baseline hypoxic.     ED course: + RSV & PNA     1/17 SUBJECTIVE & OBJECTIVE: Pt seen and examined at bedside.   Again pt was noted to be lethargic overnight and found to be hypercarbic and placed on bipap. Now sxs improved.       MEDICATIONS  (STANDING):  ALBUTerol    90 MICROgram(s) HFA Inhaler 1 Puff(s) Inhalation every 4 hours  ARIPiprazole 10 milliGRAM(s) Oral two times a day  aspirin enteric coated 81 milliGRAM(s) Oral daily  benztropine 2 milliGRAM(s) Oral two times a day  diVALproex  milliGRAM(s) Oral two times a day  enoxaparin Injectable 40 milliGRAM(s) SubCutaneous daily  influenza   Vaccine 0.5 milliLiter(s) IntraMuscular once  methylPREDNISolone sodium succinate Injectable 40 milliGRAM(s) IV Push every 8 hours  piperacillin/tazobactam IVPB. 3.375 Gram(s) IV Intermittent every 8 hours  sertraline 100 milliGRAM(s) Oral two times a day  tiotropium 18 MICROgram(s) Capsule 1 Capsule(s) Inhalation daily    MEDICATIONS  (PRN):  guaiFENesin   Syrup  (Sugar-Free) 100 milliGRAM(s) Oral every 6 hours PRN Cough    PHYSICAL EXAM:  Vital Signs Last 24 Hrs  T(C): 36.7 (18 Jan 2018 17:55), Max: 37.7 (18 Jan 2018 12:08)  T(F): 98 (18 Jan 2018 17:55), Max: 99.8 (18 Jan 2018 12:08)  HR: 86 (18 Jan 2018 18:07) (61 - 88)  BP: 137/59 (18 Jan 2018 17:55) (128/64 - 150/90)  BP(mean): --  RR: 21 (18 Jan 2018 17:55) (18 - 21)  SpO2: 95% (18 Jan 2018 18:07) (90% - 99%)    GENERAL: Lethargic, well-groomed, well-developed  HEAD:  Atraumatic, Normocephalic  EYES: EOMI, PERRLA, conjunctiva and sclera clear  ENMT: Moist mucous membranes  NECK: Supple, No JVD  NERVOUS SYSTEM:  Alert & Oriented X3, Motor Strength 5/5 B/L upper and lower extremities; DTRs 2+ intact and symmetric  CHEST/LUNG: crackles in left with decreased breath sounds in the right   HEART: Regular rate and rhythm; No murmurs, rubs, or gallops  ABDOMEN: Soft, Nontender, Nondistended; Bowel sounds present  EXTREMITIES:  2+ Peripheral Pulses, No clubbing, cyanosis, or edema          LABS:                                   11.1   9.6   )-----------( 209      ( 17 Jan 2018 06:38 )             36.4     01-17    142  |  100  |  10  ----------------------------<  92  4.2   |  38<H>  |  0.83    Ca    8.8      17 Jan 2018 06:38  Mg     2.0     01-17            ABG - ( 18 Jan 2018 09:21 )  pH: x     /  pCO2: 63    /  pO2: 97    / HCO3: 37    / Base Excess: 10.6  /  SaO2: 96

## 2018-01-18 NOTE — PROGRESS NOTE ADULT - PROBLEM SELECTOR PLAN 1
pt hypercarbic again today and responded to bipap. pt more responsive but saturating at 91 % on RA and 88 with exertion,. likely a candidate for home o2  remains afebrile. will discuss long term plan with pulmonary

## 2018-01-18 NOTE — PROGRESS NOTE ADULT - SUBJECTIVE AND OBJECTIVE BOX
Patient is a 57y old  Female who presents with a chief complaint of short of breath (09 Jan 2018 16:16)      INTERVAL HPI / OVERNIGHT EVENTS: breathing better today ,awake as well, no fever, cough present but improving     MEDICATIONS  (STANDING):  ALBUTerol    90 MICROgram(s) HFA Inhaler 1 Puff(s) Inhalation every 4 hours  ARIPiprazole 10 milliGRAM(s) Oral two times a day  aspirin enteric coated 81 milliGRAM(s) Oral daily  benztropine 2 milliGRAM(s) Oral two times a day  diVALproex  milliGRAM(s) Oral two times a day  enoxaparin Injectable 40 milliGRAM(s) SubCutaneous daily  influenza   Vaccine 0.5 milliLiter(s) IntraMuscular once  methylPREDNISolone sodium succinate Injectable 40 milliGRAM(s) IV Push every 8 hours  piperacillin/tazobactam IVPB. 3.375 Gram(s) IV Intermittent every 8 hours  sertraline 100 milliGRAM(s) Oral two times a day  tiotropium 18 MICROgram(s) Capsule 1 Capsule(s) Inhalation daily    MEDICATIONS  (PRN):  guaiFENesin   Syrup  (Sugar-Free) 100 milliGRAM(s) Oral every 6 hours PRN Cough      Vital Signs Last 24 Hrs  T(C): 37.7 (18 Jan 2018 12:08), Max: 37.7 (18 Jan 2018 12:08)  T(F): 99.8 (18 Jan 2018 12:08), Max: 99.8 (18 Jan 2018 12:08)  HR: 80 (18 Jan 2018 12:08) (61 - 81)  BP: 145/78 (18 Jan 2018 12:08) (99/55 - 150/90)  BP(mean): --  RR: 20 (18 Jan 2018 12:08) (18 - 20)  SpO2: 99% (18 Jan 2018 12:08) (95% - 99%)    PHYSICAL EXAM:  General :NAD  Constitutional:  well-groomed, well-developed  Respiratory: CTAB/L   Cardiovascular: S1 and S2, RRR, no M/G/R  Gastrointestinal: BS+, soft, NT/ND  Extremities: No peripheral edema  Vascular: 2+ peripheral pulses  Skin: No rashes      LABS:                        11.1   9.6   )-----------( 209      ( 17 Jan 2018 06:38 )             36.4     01-17    142  |  100  |  10  ----------------------------<  92  4.2   |  38<H>  |  0.83    Ca    8.8      17 Jan 2018 06:38  Mg     2.0     01-17            MICROBIOLOGY:  RECENT CULTURES:        RADIOLOGY & ADDITIONAL STUDIES:

## 2018-01-18 NOTE — PROGRESS NOTE ADULT - ASSESSMENT
chronic hypercapnic  Respiratory Failure   chronic fibrosis (R) lung   bronchiectasis     Recommendations'  saturating ok on RA   jelena BIPAP QHS   patient need to be discharged on BIPAP qHS because patient has persistent hypercapnia even on BIPAP   continue Solumedrol   bronchodilators   ABG on RA in AM   need to assess need for home oxygen upon discharge   Antibiotics as per ID

## 2018-01-19 LAB
ANION GAP SERPL CALC-SCNC: 5 MMOL/L — SIGNIFICANT CHANGE UP (ref 5–17)
BASE EXCESS BLDA CALC-SCNC: 10.1 MMOL/L — HIGH (ref -2–2)
BLOOD GAS COMMENTS: SIGNIFICANT CHANGE UP
BLOOD GAS COMMENTS: SIGNIFICANT CHANGE UP
BLOOD GAS SOURCE: SIGNIFICANT CHANGE UP
BUN SERPL-MCNC: 24 MG/DL — HIGH (ref 7–23)
CALCIUM SERPL-MCNC: 8.8 MG/DL — SIGNIFICANT CHANGE UP (ref 8.5–10.1)
CHLORIDE SERPL-SCNC: 99 MMOL/L — SIGNIFICANT CHANGE UP (ref 96–108)
CO2 SERPL-SCNC: 38 MMOL/L — HIGH (ref 22–31)
CREAT SERPL-MCNC: 0.81 MG/DL — SIGNIFICANT CHANGE UP (ref 0.5–1.3)
GLUCOSE SERPL-MCNC: 114 MG/DL — HIGH (ref 70–99)
HCO3 BLDA-SCNC: 36 MMOL/L — HIGH (ref 21–29)
HCT VFR BLD CALC: 37 % — SIGNIFICANT CHANGE UP (ref 34.5–45)
HGB BLD-MCNC: 11.3 G/DL — LOW (ref 11.5–15.5)
HOROWITZ INDEX BLDA+IHG-RTO: 21 — SIGNIFICANT CHANGE UP
MCHC RBC-ENTMCNC: 29.4 PG — SIGNIFICANT CHANGE UP (ref 27–34)
MCHC RBC-ENTMCNC: 30.6 GM/DL — LOW (ref 32–36)
MCV RBC AUTO: 96.1 FL — SIGNIFICANT CHANGE UP (ref 80–100)
PCO2 BLDA: 56 MMHG — HIGH (ref 32–46)
PH BLD: 7.42 — SIGNIFICANT CHANGE UP (ref 7.35–7.45)
PLATELET # BLD AUTO: 222 K/UL — SIGNIFICANT CHANGE UP (ref 150–400)
PO2 BLDA: 67 MMHG — LOW (ref 74–108)
POTASSIUM SERPL-MCNC: 4.8 MMOL/L — SIGNIFICANT CHANGE UP (ref 3.5–5.3)
POTASSIUM SERPL-SCNC: 4.8 MMOL/L — SIGNIFICANT CHANGE UP (ref 3.5–5.3)
RBC # BLD: 3.85 M/UL — SIGNIFICANT CHANGE UP (ref 3.8–5.2)
RBC # FLD: 14.6 % — SIGNIFICANT CHANGE UP (ref 11–15)
SAO2 % BLDA: 92 % — SIGNIFICANT CHANGE UP (ref 92–96)
SODIUM SERPL-SCNC: 142 MMOL/L — SIGNIFICANT CHANGE UP (ref 135–145)
WBC # BLD: 11.2 K/UL — HIGH (ref 3.8–10.5)
WBC # FLD AUTO: 11.2 K/UL — HIGH (ref 3.8–10.5)

## 2018-01-19 PROCEDURE — 99233 SBSQ HOSP IP/OBS HIGH 50: CPT

## 2018-01-19 RX ORDER — BUDESONIDE AND FORMOTEROL FUMARATE DIHYDRATE 160; 4.5 UG/1; UG/1
2 AEROSOL RESPIRATORY (INHALATION)
Qty: 0 | Refills: 0 | Status: DISCONTINUED | OUTPATIENT
Start: 2018-01-19 | End: 2018-01-20

## 2018-01-19 RX ADMIN — Medication 40 MILLIGRAM(S): at 05:50

## 2018-01-19 RX ADMIN — SERTRALINE 100 MILLIGRAM(S): 25 TABLET, FILM COATED ORAL at 17:19

## 2018-01-19 RX ADMIN — ENOXAPARIN SODIUM 40 MILLIGRAM(S): 100 INJECTION SUBCUTANEOUS at 12:50

## 2018-01-19 RX ADMIN — Medication 2 MILLIGRAM(S): at 17:19

## 2018-01-19 RX ADMIN — Medication 81 MILLIGRAM(S): at 12:51

## 2018-01-19 RX ADMIN — DIVALPROEX SODIUM 500 MILLIGRAM(S): 500 TABLET, DELAYED RELEASE ORAL at 05:53

## 2018-01-19 RX ADMIN — ARIPIPRAZOLE 10 MILLIGRAM(S): 15 TABLET ORAL at 18:46

## 2018-01-19 RX ADMIN — Medication 40 MILLIGRAM(S): at 14:55

## 2018-01-19 RX ADMIN — SERTRALINE 100 MILLIGRAM(S): 25 TABLET, FILM COATED ORAL at 05:51

## 2018-01-19 RX ADMIN — PIPERACILLIN AND TAZOBACTAM 12.5 GRAM(S): 4; .5 INJECTION, POWDER, LYOPHILIZED, FOR SOLUTION INTRAVENOUS at 22:27

## 2018-01-19 RX ADMIN — ARIPIPRAZOLE 10 MILLIGRAM(S): 15 TABLET ORAL at 05:51

## 2018-01-19 RX ADMIN — PIPERACILLIN AND TAZOBACTAM 12.5 GRAM(S): 4; .5 INJECTION, POWDER, LYOPHILIZED, FOR SOLUTION INTRAVENOUS at 15:28

## 2018-01-19 RX ADMIN — Medication 40 MILLIGRAM(S): at 17:18

## 2018-01-19 RX ADMIN — PIPERACILLIN AND TAZOBACTAM 12.5 GRAM(S): 4; .5 INJECTION, POWDER, LYOPHILIZED, FOR SOLUTION INTRAVENOUS at 05:50

## 2018-01-19 RX ADMIN — ALBUTEROL 1 PUFF(S): 90 AEROSOL, METERED ORAL at 05:52

## 2018-01-19 RX ADMIN — ALBUTEROL 1 PUFF(S): 90 AEROSOL, METERED ORAL at 17:17

## 2018-01-19 RX ADMIN — TIOTROPIUM BROMIDE 1 CAPSULE(S): 18 CAPSULE ORAL; RESPIRATORY (INHALATION) at 12:51

## 2018-01-19 RX ADMIN — ALBUTEROL 1 PUFF(S): 90 AEROSOL, METERED ORAL at 15:29

## 2018-01-19 RX ADMIN — DIVALPROEX SODIUM 500 MILLIGRAM(S): 500 TABLET, DELAYED RELEASE ORAL at 17:18

## 2018-01-19 RX ADMIN — Medication 2 MILLIGRAM(S): at 05:51

## 2018-01-19 NOTE — PROGRESS NOTE ADULT - PROBLEM SELECTOR PLAN 3
In the setting of chronic fibrotic lung disease with cystic lesions  d/c zosyn s/p 10 days of antibiotics  no fever   pct normal

## 2018-01-19 NOTE — PROGRESS NOTE ADULT - PROBLEM SELECTOR PROBLEM 1
Acute respiratory failure with hypoxia and hypercapnia
Acute respiratory failure with hypoxia and hypercapnia
Acute respiratory failure with hypercapnia
Acute respiratory failure with hypoxia and hypercapnia

## 2018-01-19 NOTE — PROGRESS NOTE ADULT - NSHPATTENDINGPLANDISCUSS_GEN_ALL_CORE
pt's sister Freddie and her brother (oncologist in [place])[phone]
RN and patient
RN and patient sister and brother
RN, ID

## 2018-01-19 NOTE — PROGRESS NOTE ADULT - ATTENDING COMMENTS
Call if ID input needed over the long weekend, Dr. Kamar Elizabeth is on call.    Dr. VEE Jimenez to assume care on 1/16/18.    Manuel Mccarty MD  [phone]
Discussed with sister at bedside.    Manuel Mccarty MD  [phone]
call prn   antibiotics stopped today   monitor for resp distress  on steroids  pulm on board
Call if ID input needed over the long weekend, Dr. Kamar Elizabeth is on call.    Dr. VEE Jimenez to assume care on 1/16/18.    Manuel Mccarty MD  [phone]
Call if ID input needed over the long weekend, Dr. Kamar Elizabeth is on call.    Dr. VEE Jimenez to assume care on 1/16/18.    Manuel Mccarty MD  [phone]

## 2018-01-19 NOTE — PROGRESS NOTE ADULT - SUBJECTIVE AND OBJECTIVE BOX
Patient is a 57y old  Female who presents with a chief complaint of short of breath (09 Jan 2018 16:16)      INTERVAL HPI / OVERNIGHT EVENTS: breathing somewhat better,no fever etc    MEDICATIONS  (STANDING):  ALBUTerol    90 MICROgram(s) HFA Inhaler 1 Puff(s) Inhalation every 4 hours  ARIPiprazole 10 milliGRAM(s) Oral two times a day  aspirin enteric coated 81 milliGRAM(s) Oral daily  benztropine 2 milliGRAM(s) Oral two times a day  diVALproex  milliGRAM(s) Oral two times a day  enoxaparin Injectable 40 milliGRAM(s) SubCutaneous daily  influenza   Vaccine 0.5 milliLiter(s) IntraMuscular once  methylPREDNISolone sodium succinate Injectable 40 milliGRAM(s) IV Push every 8 hours  piperacillin/tazobactam IVPB. 3.375 Gram(s) IV Intermittent every 8 hours  sertraline 100 milliGRAM(s) Oral two times a day  tiotropium 18 MICROgram(s) Capsule 1 Capsule(s) Inhalation daily    MEDICATIONS  (PRN):  guaiFENesin   Syrup  (Sugar-Free) 100 milliGRAM(s) Oral every 6 hours PRN Cough      Vital Signs Last 24 Hrs  T(C): 36.9 (19 Jan 2018 11:44), Max: 37.1 (18 Jan 2018 23:40)  T(F): 98.4 (19 Jan 2018 11:44), Max: 98.8 (18 Jan 2018 23:40)  HR: 68 (19 Jan 2018 11:44) (65 - 86)  BP: 125/75 (19 Jan 2018 11:44) (120/74 - 137/59)  BP(mean): --  RR: 6 (19 Jan 2018 11:44) (6 - 21)  SpO2: 96% (19 Jan 2018 11:44) (93% - 97%)    PHYSICAL EXAM:  General :NAD  Constitutional:  well-groomed, well-developed  Respiratory: occasional rhonchi  Cardiovascular: S1 and S2, RRR, no M/G/R  Gastrointestinal: BS+, soft, NT/ND  Extremities: No peripheral edema  Vascular: 2+ peripheral pulses  Skin: No rashes      LABS:                        11.3   11.2  )-----------( 222      ( 19 Jan 2018 06:23 )             37.0     01-19    142  |  99  |  24<H>  ----------------------------<  114<H>  4.8   |  38<H>  |  0.81    Ca    8.8      19 Jan 2018 06:23    PCT < 0.05        MICROBIOLOGY:  RECENT CULTURES:        RADIOLOGY & ADDITIONAL STUDIES:

## 2018-01-19 NOTE — PROGRESS NOTE ADULT - PROVIDER SPECIALTY LIST ADULT
Cardiology
Cardiology
Critical Care
Hospitalist
Infectious Disease
Pulmonology
Infectious Disease
Hospitalist
Infectious Disease

## 2018-01-19 NOTE — PROGRESS NOTE ADULT - PROBLEM SELECTOR PLAN 3
continue with aripiprazole, divalproex, benztropine likely gram neg pneumonia,ID to d.c abx today likely gram neg pneumonia,ID to d.c abx today  resolved

## 2018-01-19 NOTE — PROGRESS NOTE ADULT - SUBJECTIVE AND OBJECTIVE BOX
HPI:  58yo female with pmh schizophrenia, h/o chronic lung infection, h/o TB and untreated for a long time with rt lung collapse, presents with 10 days of not feeling well, worsening of chronic cough and sob. Pt at baseline mental status.  Pt lives with sister.  Per family, pt also with chronic diarrhea for years, unchanged. Pt baseline hypoxic.     ED course: + RSV & PNA     1/17 SUBJECTIVE & OBJECTIVE: Pt seen and examined at bedside.  no new episodes of lethargy pr AMS. breathing stable.       MEDICATIONS  (STANDING):  ALBUTerol    90 MICROgram(s) HFA Inhaler 1 Puff(s) Inhalation every 4 hours  ARIPiprazole 10 milliGRAM(s) Oral two times a day  aspirin enteric coated 81 milliGRAM(s) Oral daily  benztropine 2 milliGRAM(s) Oral two times a day  diVALproex  milliGRAM(s) Oral two times a day  enoxaparin Injectable 40 milliGRAM(s) SubCutaneous daily  influenza   Vaccine 0.5 milliLiter(s) IntraMuscular once  methylPREDNISolone sodium succinate Injectable 40 milliGRAM(s) IV Push every 8 hours  piperacillin/tazobactam IVPB. 3.375 Gram(s) IV Intermittent every 8 hours  sertraline 100 milliGRAM(s) Oral two times a day  tiotropium 18 MICROgram(s) Capsule 1 Capsule(s) Inhalation daily    MEDICATIONS  (PRN):  guaiFENesin   Syrup  (Sugar-Free) 100 milliGRAM(s) Oral every 6 hours PRN Cough        PHYSICAL EXAM:  Vital Signs Last 24 Hrs  T(C): 36.8 (19 Jan 2018 05:35), Max: 37.7 (18 Jan 2018 12:08)  T(F): 98.2 (19 Jan 2018 05:35), Max: 99.8 (18 Jan 2018 12:08)  HR: 65 (19 Jan 2018 05:35) (65 - 88)  BP: 120/74 (19 Jan 2018 05:35) (120/74 - 145/78)  BP(mean): --  RR: 18 (19 Jan 2018 05:35) (18 - 21)  SpO2: 95% (19 Jan 2018 05:35) (90% - 99%)    GENERAL: Lethargic, well-groomed, well-developed  HEAD:  Atraumatic, Normocephalic  EYES: EOMI, PERRLA, conjunctiva and sclera clear  ENMT: Moist mucous membranes  NECK: Supple, No JVD  NERVOUS SYSTEM:  Alert & Oriented X3, Motor Strength 5/5 B/L upper and lower extremities; DTRs 2+ intact and symmetric  CHEST/LUNG: improved crackles in left.  decreased breath sounds in the right   HEART: Regular rate and rhythm; No murmurs, rubs, or gallops  ABDOMEN: Soft, Nontender, Nondistended; Bowel sounds present  EXTREMITIES:  2+ Peripheral Pulses, No clubbing, cyanosis, or edema          LABS:                                                     11.3   11.2  )-----------( 222      ( 19 Jan 2018 06:23 )             37.0     01-19    142  |  99  |  24<H>  ----------------------------<  114<H>  4.8   |  38<H>  |  0.81    Ca    8.8      19 Jan 2018 06:23                    ABG - ( 18 Jan 2018 09:21 )  pH: x     /  pCO2: 63    /  pO2: 97    / HCO3: 37    / Base Excess: 10.6  /  SaO2: 96

## 2018-01-19 NOTE — PROGRESS NOTE ADULT - ASSESSMENT
chronic fibrosis   bronchiectasis   hypercapnic  Respiratory Failure     Recommendations    decrease Solumedrol   change to prednisone in 1-2 days   discharge patient on O2 and Trilogy= patient has persistent respiratory acidosis even on BIPAP   discharge patient on prednisone taper , symbicort , spiriva and proventil PRN   also give proventil and atrovent nebuliser to swapna for increase in shortness of breath   f/u as out patient

## 2018-01-19 NOTE — PROGRESS NOTE ADULT - SUBJECTIVE AND OBJECTIVE BOX
patient feeling better  Vital Signs Last 24 Hrs  T(C): 36.9 (19 Jan 2018 11:44), Max: 37.1 (18 Jan 2018 23:40)  T(F): 98.4 (19 Jan 2018 11:44), Max: 98.8 (18 Jan 2018 23:40)  HR: 68 (19 Jan 2018 11:44) (65 - 86)  BP: 125/75 (19 Jan 2018 11:44) (120/74 - 137/59)  BP(mean): --  RR: 6 (19 Jan 2018 11:44) (6 - 21)  SpO2: 96% (19 Jan 2018 11:44) (93% - 97%)  Physical Exam  patient lying in bed in no respiratory distress  HEENT - EOMI, PERRLA ,neck supple , No JVD  lungs -ronchi improved  COR- [id number] , no murmer  Abd- soft, BS+, no tenderness, no guarding   ext- ecc neg, good pulses  Neuro - Alert , oriented X3   Skin- No rashes   MEDICATIONS  (STANDING):  ALBUTerol    90 MICROgram(s) HFA Inhaler 1 Puff(s) Inhalation every 4 hours  ARIPiprazole 10 milliGRAM(s) Oral two times a day  aspirin enteric coated 81 milliGRAM(s) Oral daily  benztropine 2 milliGRAM(s) Oral two times a day  buDESOnide 160 MICROgram(s)/formoterol 4.5 MICROgram(s) Inhaler 2 Puff(s) Inhalation two times a day  diVALproex  milliGRAM(s) Oral two times a day  enoxaparin Injectable 40 milliGRAM(s) SubCutaneous daily  influenza   Vaccine 0.5 milliLiter(s) IntraMuscular once  methylPREDNISolone sodium succinate Injectable 40 milliGRAM(s) IV Push every 12 hours  piperacillin/tazobactam IVPB. 3.375 Gram(s) IV Intermittent every 8 hours  sertraline 100 milliGRAM(s) Oral two times a day  tiotropium 18 MICROgram(s) Capsule 1 Capsule(s) Inhalation daily    MEDICATIONS  (PRN):  guaiFENesin   Syrup  (Sugar-Free) 100 milliGRAM(s) Oral every 6 hours PRN Cough  ABG - ( 19 Jan 2018 12:07 )  pH: x     /  pCO2: 56    /  pO2: 67    / HCO3: 36    / Base Excess: 10.1  /  SaO2: 92                                    11.3   11.2  )-----------( 222      ( 19 Jan 2018 06:23 )             37.0   01-19    142  |  99  |  24<H>  ----------------------------<  114<H>  4.8   |  38<H>  |  0.81    Ca    8.8      19 Jan 2018 06:23

## 2018-01-19 NOTE — PROGRESS NOTE ADULT - ASSESSMENT
57 year old female with a PMHx notable for schizophrenia, h/o TB w/ secondary R lung bronchiectasis presents for fever, SOB, worsening chronic cough. Admitted with acute on chronic hypercapnic respiratory failure  PNA and elevated troponin. RVP positive for RSV. 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 Admitted with acute on chronic hypercapnic respiratory failure  PNA and elevated troponin. RVP positive for RSV.

## 2018-01-19 NOTE — PROGRESS NOTE ADULT - PROBLEM SELECTOR PLAN 5
DVT prophylaxsis: SQ lovenox daily monitor, check iron studies, likely AOCD, no signs of active bleed

## 2018-01-19 NOTE — PROGRESS NOTE ADULT - PROBLEM SELECTOR PLAN 4
monitor, check iron studies, likely AOCD, no signs of active bleed continue with aripiprazole, divalproex, benztropine

## 2018-01-20 ENCOUNTER — TRANSCRIPTION ENCOUNTER (OUTPATIENT)
Age: 58
End: 2018-01-20

## 2018-01-20 VITALS
DIASTOLIC BLOOD PRESSURE: 69 MMHG | TEMPERATURE: 99 F | OXYGEN SATURATION: 100 % | HEART RATE: 86 BPM | RESPIRATION RATE: 18 BRPM | SYSTOLIC BLOOD PRESSURE: 116 MMHG

## 2018-01-20 DIAGNOSIS — J44.9 CHRONIC OBSTRUCTIVE PULMONARY DISEASE, UNSPECIFIED: ICD-10-CM

## 2018-01-20 PROCEDURE — 99239 HOSP IP/OBS DSCHRG MGMT >30: CPT

## 2018-01-20 RX ORDER — ASPIRIN/CALCIUM CARB/MAGNESIUM 324 MG
1 TABLET ORAL
Qty: 0 | Refills: 0 | DISCHARGE
Start: 2018-01-20

## 2018-01-20 RX ORDER — ALBUTEROL 90 UG/1
1 AEROSOL, METERED ORAL
Qty: 1 | Refills: 3 | OUTPATIENT
Start: 2018-01-20 | End: 2018-05-19

## 2018-01-20 RX ORDER — IPRATROPIUM BROMIDE 0.2 MG/ML
2.5 SOLUTION, NON-ORAL INHALATION
Qty: 500 | Refills: 2 | OUTPATIENT
Start: 2018-01-20 | End: 2018-04-19

## 2018-01-20 RX ORDER — EPINEPHRINE 0.3 MG/.3ML
3 INJECTION INTRAMUSCULAR; SUBCUTANEOUS
Qty: 1 | Refills: 1 | OUTPATIENT
Start: 2018-01-20 | End: 2018-03-20

## 2018-01-20 RX ORDER — BUDESONIDE AND FORMOTEROL FUMARATE DIHYDRATE 160; 4.5 UG/1; UG/1
1 AEROSOL RESPIRATORY (INHALATION)
Qty: 1 | Refills: 3 | OUTPATIENT
Start: 2018-01-20 | End: 2018-05-19

## 2018-01-20 RX ORDER — TIOTROPIUM BROMIDE 18 UG/1
1 CAPSULE ORAL; RESPIRATORY (INHALATION)
Qty: 30 | Refills: 3 | OUTPATIENT
Start: 2018-01-20 | End: 2018-05-19

## 2018-01-20 RX ADMIN — ENOXAPARIN SODIUM 40 MILLIGRAM(S): 100 INJECTION SUBCUTANEOUS at 11:10

## 2018-01-20 RX ADMIN — SERTRALINE 100 MILLIGRAM(S): 25 TABLET, FILM COATED ORAL at 05:36

## 2018-01-20 RX ADMIN — PIPERACILLIN AND TAZOBACTAM 12.5 GRAM(S): 4; .5 INJECTION, POWDER, LYOPHILIZED, FOR SOLUTION INTRAVENOUS at 13:25

## 2018-01-20 RX ADMIN — Medication 2 MILLIGRAM(S): at 05:36

## 2018-01-20 RX ADMIN — TIOTROPIUM BROMIDE 1 CAPSULE(S): 18 CAPSULE ORAL; RESPIRATORY (INHALATION) at 11:10

## 2018-01-20 RX ADMIN — ALBUTEROL 1 PUFF(S): 90 AEROSOL, METERED ORAL at 13:28

## 2018-01-20 RX ADMIN — DIVALPROEX SODIUM 500 MILLIGRAM(S): 500 TABLET, DELAYED RELEASE ORAL at 05:36

## 2018-01-20 RX ADMIN — BUDESONIDE AND FORMOTEROL FUMARATE DIHYDRATE 2 PUFF(S): 160; 4.5 AEROSOL RESPIRATORY (INHALATION) at 05:45

## 2018-01-20 RX ADMIN — Medication 81 MILLIGRAM(S): at 11:09

## 2018-01-20 RX ADMIN — ALBUTEROL 1 PUFF(S): 90 AEROSOL, METERED ORAL at 05:45

## 2018-01-20 RX ADMIN — ALBUTEROL 1 PUFF(S): 90 AEROSOL, METERED ORAL at 10:00

## 2018-01-20 RX ADMIN — PIPERACILLIN AND TAZOBACTAM 12.5 GRAM(S): 4; .5 INJECTION, POWDER, LYOPHILIZED, FOR SOLUTION INTRAVENOUS at 05:35

## 2018-01-20 RX ADMIN — Medication 40 MILLIGRAM(S): at 05:44

## 2018-01-20 RX ADMIN — ARIPIPRAZOLE 10 MILLIGRAM(S): 15 TABLET ORAL at 05:42

## 2018-01-20 NOTE — DISCHARGE NOTE ADULT - MEDICATION SUMMARY - MEDICATIONS TO TAKE
I will START or STAY ON the medications listed below when I get home from the hospital:    nebulizer  -- 1 unit(s)  once a day   -- Indication: For Copd     Deltasone 20 mg oral tablet  -- 2 tab(s) by mouth once a day   -- It is very important that you take or use this exactly as directed.  Do not skip doses or discontinue unless directed by your doctor.  Obtain medical advice before taking any non-prescription drugs as some may affect the action of this medication.  Take with food or milk.    -- Indication: For Copd     aspirin 81 mg oral delayed release tablet  -- 1 tab(s) by mouth once a day  -- Indication: For Cad    divalproex sodium 500 mg oral delayed release tablet  -- 1 tab(s) by mouth 2 times a day  -- Indication: For Psych     sertraline 100 mg oral tablet  -- 1 tab(s) by mouth once a day  -- Indication: For Psych    benztropine 2 mg oral tablet  -- 1 tab(s) by mouth 2 times a day  -- Indication: For PSYCH    ARIPiprazole 10 mg oral tablet, disintegrating  -- 1 tab(s) by mouth once a day  -- Indication: For PSYCH    albuterol 90 mcg/inh inhalation aerosol  -- 1 puff(s) inhaled every 4 hours  -- Indication: For Copd     budesonide-formoterol 160 mcg-4.5 mcg/inh inhalation aerosol  -- 1 puff(s) inhaled 2 times a day   -- Indication: For Copd    tiotropium 18 mcg inhalation capsule  -- 1 cap(s) inhaled once a day  -- Indication: For Copd    albuterol 1.25 mg/3 mL (0.042%) inhalation solution  -- 3 milliliter(s) by nebulizer 3 times a day   -- For inhalation only.  It is very important that you take or use this exactly as directed.  Do not skip doses or discontinue unless directed by your doctor.  Obtain medical advice before taking any non-prescription drugs as some may affect the action of this medication.    -- Indication: For Copd    ipratropium 500 mcg/2.5 mL inhalation solution  -- 2.5 milliliter(s) by nebulizer 3 times a day   -- For inhalation only.  It is very important that you take or use this exactly as directed.  Do not skip doses or discontinue unless directed by your doctor.    -- Indication: For Copd    guaiFENesin 100 mg/5 mL oral liquid  -- 5 milliliter(s) by mouth every 6 hours, As needed, Cough  -- Indication: For Cough

## 2018-01-20 NOTE — DISCHARGE NOTE ADULT - PATIENT PORTAL LINK FT
“You can access the [employer] Patient Portal, offered by [facility], by registering with the following website: [email or web address]”

## 2018-01-20 NOTE — CHART NOTE - NSCHARTNOTEFT_GEN_A_CORE
Upon Nutritional Assessment by the Registered Dietitian your patient was determined to meet criteria / has evidence of the following diagnosis/diagnoses:          [ ]  Mild Protein Calorie Malnutrition        [ ]  Moderate Protein Calorie Malnutrition        [x ] Severe Protein Calorie Malnutrition        [ ] Unspecified Protein Calorie Malnutrition        [ ] Underweight / BMI <19        [ ] Morbid Obesity / BMI > 40      Findings as based on:  •  Comprehensive nutrition assessment and consultation  •  Calorie counts (nutrient intake analysis)  •  Food acceptance and intake status from observations by staff  •  Follow up  •  Patient education  •  Intervention secondary to interdisciplinary rounds  •   concerns      Treatment:    The following diet has been recommended: Continue Regular, Lactose restricted, kosher, c ensure enlive 8 oz three times per day (1050 aruna, 60 gm pro) . Recommend: GI consult due to frequent BM's and HX of Diarrhea.      PROVIDER Section:     By signing this assessment you are acknowledging and agree with the diagnosis/diagnoses assigned by the Registered Dietitian    Comments:

## 2018-01-20 NOTE — CHART NOTE - NSCHARTNOTEFT_GEN_A_CORE
Assessment:  Pt c acute respiratory failure c hypoxia & hypercapnia on bipap; respiratory syncytial virus infection; R/O Pneumonia; TB no concrern of active; Schixophrenia    Factors impacting intake: [x ] none [ ] nausea  [ ] vomiting [ ] diarrhea [ ] constipation  [ ]chewing problems [ ] swallowing issues  [ ] other:   Pt is alert/confused. No edema noted.  1/15 - Diet Prescription: Diet, Regular:   Lactose Restricted (Milk Sugar Intoler.)  Kosher  Supplement Feeding Modality:  Oral  Ensure Enlive Cans or Servings Per Day:  1       Frequency:  Three Times a day (01-15-18 @ 13:32)    Intake: 80 %    Current Weight: 1/15 - 75.8kg (167.1#), 1/10 77.6 kg (171 #),  % Weight Change -  2.2% loss x 5 days    Pertinent Medications: MEDICATIONS  (STANDING):  ALBUTerol    90 MICROgram(s) HFA Inhaler 1 Puff(s) Inhalation every 4 hours  ARIPiprazole 10 milliGRAM(s) Oral two times a day  aspirin enteric coated 81 milliGRAM(s) Oral daily  benztropine 2 milliGRAM(s) Oral two times a day  buDESOnide 160 MICROgram(s)/formoterol 4.5 MICROgram(s) Inhaler 2 Puff(s) Inhalation two times a day  diVALproex  milliGRAM(s) Oral two times a day  enoxaparin Injectable 40 milliGRAM(s) SubCutaneous daily  influenza   Vaccine 0.5 milliLiter(s) IntraMuscular once  methylPREDNISolone sodium succinate Injectable 40 milliGRAM(s) IV Push every 12 hours  piperacillin/tazobactam IVPB. 3.375 Gram(s) IV Intermittent every 8 hours  sertraline 100 milliGRAM(s) Oral two times a day  tiotropium 18 MICROgram(s) Capsule 1 Capsule(s) Inhalation daily    MEDICATIONS  (PRN):  guaiFENesin   Syrup  (Sugar-Free) 100 milliGRAM(s) Oral every 6 hours PRN Cough    Pertinent Labs:    CAPILLARY BLOOD GLUCOSE      1/19 - BM x 3  Skin:  WDL    Estimated Needs:   [x ] no change since previous assessment  [ ] recalculated:     Previous Nutrition Diagnosis:   [ ] Inadequate Energy Intake [ ]Inadequate Oral Intake [ ] Excessive Energy Intake   [ ] Underweight [ ] Increased Nutrient Needs [ ] Overweight/Obesity   [x ] Altered GI Function [ ] Unintended Weight Loss [ ] Food & Nutrition Related Knowledge Deficit [ ] Malnutrition [ ] Inadequate Energy Intake [ ]Inadequate Oral Intake [ ] Excessive Energy Intake   [ ] Underweight [ ] Increased Nutrient Needs [ ] Overweight/Obesity   [ ] Altered GI Function [ ] Unintended Weight Loss [ ] Food & Nutrition Related Knowledge Deficit [x] Malnutrition - Acute Severe Malnutrition     Related to: inadequate energy and protein intake    As evidenced by: 2.2% wt loss x 5 days    Nutrition Diagnosis is [x ] ongoing  [ ] resolved [ ] not applicable     New Nutrition Diagnosis: [x ]  applicable       Interventions:   Recommend  [ ] Change Diet To:  [x ] Nutrition Supplement - continue ensure enlive 8 oz three times per day (1050 aruna, 60 gm pro)   [ ] Nutrition Support  [x] Other: Recommend GI consult for multiple BM    Monitoring and Evaluation:   [ ] PO intake [ x ] Tolerance to diet prescription [ x ] weights [ x ] labs[ x ] follow up per protocol  [ ] other: Assessment:  Pt c acute respiratory failure c hypoxia & hypercapnia on bipap; respiratory syncytial virus infection; R/O Pneumonia; TB no concrern of active; Schixophrenia    Factors impacting intake: [ ] none [ ] nausea  [ ] vomiting [ ] diarrhea [ ] constipation  [ ]chewing problems [ ] swallowing issues  [x ] other: frequent stools  Pt is alert/confused. No edema noted.  NFPA performed, no physical signs of Malnutrition present @ this time.  1/15 - Diet Prescription: Diet, Regular:   Lactose Restricted (Milk Sugar Intoler.)  Kosher  Supplement Feeding Modality:  Oral  Ensure Enlive Cans or Servings Per Day:  1       Frequency:  Three Times a day (01-15-18 @ 13:32)    Intake: 25 - 100 %    Current Weight: 1/15 - 75.8kg (167.1#), 1/10 77.6 kg (171 #),  % Weight Change -  2.2% loss x 5 days    Pertinent Medications: MEDICATIONS  (STANDING):  ALBUTerol    90 MICROgram(s) HFA Inhaler 1 Puff(s) Inhalation every 4 hours  ARIPiprazole 10 milliGRAM(s) Oral two times a day  aspirin enteric coated 81 milliGRAM(s) Oral daily  benztropine 2 milliGRAM(s) Oral two times a day  buDESOnide 160 MICROgram(s)/formoterol 4.5 MICROgram(s) Inhaler 2 Puff(s) Inhalation two times a day  diVALproex  milliGRAM(s) Oral two times a day  enoxaparin Injectable 40 milliGRAM(s) SubCutaneous daily  influenza   Vaccine 0.5 milliLiter(s) IntraMuscular once  methylPREDNISolone sodium succinate Injectable 40 milliGRAM(s) IV Push every 12 hours  piperacillin/tazobactam IVPB. 3.375 Gram(s) IV Intermittent every 8 hours  sertraline 100 milliGRAM(s) Oral two times a day  tiotropium 18 MICROgram(s) Capsule 1 Capsule(s) Inhalation daily    MEDICATIONS  (PRN):  guaiFENesin   Syrup  (Sugar-Free) 100 milliGRAM(s) Oral every 6 hours PRN Cough    Pertinent Labs:    CAPILLARY BLOOD GLUCOSE      1/19 - BM x 3  Skin:  WDL    Estimated Needs:   [x ] no change since previous assessment  [ ] recalculated:     Previous Nutrition Diagnosis:   [ ] Inadequate Energy Intake [ ]Inadequate Oral Intake [ ] Excessive Energy Intake   [ ] Underweight [ ] Increased Nutrient Needs [ ] Overweight/Obesity   [x ] Altered GI Function [ ] Unintended Weight Loss [ ] Food & Nutrition Related Knowledge Deficit [ ] Malnutrition [ ] Inadequate Energy Intake [ ]Inadequate Oral Intake [ ] Excessive Energy Intake   [ ] Underweight [ ] Increased Nutrient Needs [ ] Overweight/Obesity   [ ] Altered GI Function [ ] Unintended Weight Loss [ ] Food & Nutrition Related Knowledge Deficit [x] Malnutrition - Acute Severe Malnutrition     Related to: inadequate energy and protein intake related to Schizophrenia & Frequent BM's    As evidenced by: 2.2% wt loss x 5 days    Nutrition Diagnosis is [x ] ongoing  [ ] resolved [ ] not applicable     New Nutrition Diagnosis: [x ]  applicable       Interventions:   Recommend  [ ] Change Diet To:  [x ] Nutrition Supplement - continue ensure enlive 8 oz three times per day (1050 aruna, 60 gm pro)   [ ] Nutrition Support  [x] Other: Recommend GI consult for multiple BM    Monitoring and Evaluation:   [ ] PO intake [ x ] Tolerance to diet prescription [ x ] weights [ x ] labs[ x ] follow up per protocol  [ ] other: Assessment:  Pt c acute respiratory failure c hypoxia & hypercapnia on bipap; respiratory syncytial virus infection; R/O Pneumonia; TB no concrern of active; Schixophrenia    Factors impacting intake: [ ] none [ ] nausea  [ ] vomiting [ ] diarrhea [ ] constipation  [ ]chewing problems [ ] swallowing issues  [x ] other: frequent stools  Pt is alert/confused. No edema noted.  NFPA performed, no physical signs of Malnutrition present @ this time.  1/15 - Diet Prescription: Diet, Regular:   Lactose Restricted (Milk Sugar Intoler.)  Kosher  Supplement Feeding Modality:  Oral  Ensure Enlive Cans or Servings Per Day:  1       Frequency:  Three Times a day (01-15-18 @ 13:32)    Intake: 25 - 100 %    Current Weight: 1/15 - 75.8kg (167.1#), 1/10 77.6 kg (171 #),  % Weight Change -  2.2% loss x 5 days    Pertinent Medications: MEDICATIONS  (STANDING):  ALBUTerol    90 MICROgram(s) HFA Inhaler 1 Puff(s) Inhalation every 4 hours  ARIPiprazole 10 milliGRAM(s) Oral two times a day  aspirin enteric coated 81 milliGRAM(s) Oral daily  benztropine 2 milliGRAM(s) Oral two times a day  buDESOnide 160 MICROgram(s)/formoterol 4.5 MICROgram(s) Inhaler 2 Puff(s) Inhalation two times a day  diVALproex  milliGRAM(s) Oral two times a day  enoxaparin Injectable 40 milliGRAM(s) SubCutaneous daily  influenza   Vaccine 0.5 milliLiter(s) IntraMuscular once  methylPREDNISolone sodium succinate Injectable 40 milliGRAM(s) IV Push every 12 hours  piperacillin/tazobactam IVPB. 3.375 Gram(s) IV Intermittent every 8 hours  sertraline 100 milliGRAM(s) Oral two times a day  tiotropium 18 MICROgram(s) Capsule 1 Capsule(s) Inhalation daily    MEDICATIONS  (PRN):  guaiFENesin   Syrup  (Sugar-Free) 100 milliGRAM(s) Oral every 6 hours PRN Cough    Pertinent Labs:    CAPILLARY BLOOD GLUCOSE      1/19 - BM x 3  Skin:  WDL    Estimated Needs:   [x ] no change since previous assessment  [ ] recalculated:     Previous Nutrition Diagnosis:   [ ] Inadequate Energy Intake [ ]Inadequate Oral Intake [ ] Excessive Energy Intake   [ ] Underweight [ ] Increased Nutrient Needs [ ] Overweight/Obesity   [x ] Altered GI Function [ ] Unintended Weight Loss [ ] Food & Nutrition Related Knowledge Deficit [ ] Malnutrition [ ] Inadequate Energy Intake [ ]Inadequate Oral Intake [ ] Excessive Energy Intake   [ ] Underweight [ ] Increased Nutrient Needs [ ] Overweight/Obesity   [ ] Altered GI Function [ ] Unintended Weight Loss [ ] Food & Nutrition Related Knowledge Deficit [x] Malnutrition - Acute Severe Malnutrition     Related to: inadequate energy and protein intake related to Schizophrenia & Frequent BM's    As evidenced by: 2.2% wt loss x 5 days    Nutrition Diagnosis is [x ] ongoing  [ ] resolved [ ] not applicable     New Nutrition Diagnosis: [x ]  applicable       Interventions:   Recommend  [ ] Change Diet To:  [x ] Nutrition Supplement - continue ensure enlive 8 oz three times per day (1050 aruna, 60 gm pro)   [ ] Nutrition Support  [x] Other: Recommend GI consult for multiple BM    Monitoring and Evaluation:   [ ] PO intake [ x ] Tolerance to diet prescription [ x ] weights [ x ] labs[ x ] follow up per protocol  [x ] other: High Calorie High Protein Nutrition Therapy diet literature left at bedside for family members.

## 2018-01-20 NOTE — DISCHARGE NOTE ADULT - CARE PROVIDER_API CALL
Olivia Valentino), TeleHealth  [address] [place] [address] [address]  [place] [address]  Phone: [phone]  Fax: [phone]    pcp,   Phone: (   )    -  Fax: (   )    -

## 2018-01-20 NOTE — DISCHARGE NOTE ADULT - HOSPITAL COURSE
56yo female w/schizophrenia, h/o TB and untreated for a long time, R lung collapse, bronchiectasis, now admitted for acute on chronic hypercapnic respiratory failure. Possible fluid overload. Patient also RSV positive which may have triggered acute decompensation.  Admitted to ICU for acute on chronic hypercapnic respiratory failure with R lung probably nonfunctional with RSV+ Initially on BiPAP, weaned to  BiPAP at night and prn for chronic hypercapnia now . Continue albuterol now q4 hours and PRN,  requires small amount oxygen supplementation 1L NC to maintain O2 sat > 90, desaturates on RA to the 80s. NSTEMI, vs demand ischemia. Troponins downtrending. Cardiology on case Dr. Kelly. Pt was transferred to the floor and breathing continued to improve. Finished course of abx. Pt evaluated by pulmonologist and recommends her being discharged with bipap and multiple inhalers and to follow up as an outpatient.     . Problem/Plan - 1:  ·  Problem: Acute respiratory failure with hypoxia and hypercapnia.  Plan: no new episodes of ams and tolerating bipap well at night.      pulmonary wants bipap on dc.  pt also qualifies for home 2 and supplies sent to house      Problem/Plan - 2:  ·  Problem: Sepsis, due to unspecified organism.  Plan: likely d.t pna and now resolved.      Problem/Plan - 3:  ·  Problem: Pneumonia.  Plan: likely gram neg pneumonia,IDon board and  finished course of  abx   resolved.     Problem/Plan - 4:  ·  Problem: Schizophrenia, unspecified type.  Plan: continue with aripiprazole, divalproex, benztropine.      Problem/Plan - 5:  ·  Problem: Anemia, unspecified type.  Plan: monitor, check iron studies, likely AOCD, no signs of active bleed.      Problem/Plan - 6:  Problem: Chronic obstructive pulmonary disease, unspecified COPD type.Plan: start inhalers on discharge   trilogy machine at night.

## 2018-01-20 NOTE — DISCHARGE NOTE ADULT - PLAN OF CARE
follow up with pulmonary trilogy machine at night   c/w inhaler   home o2 follow up with pcp c/w meds

## 2018-01-20 NOTE — DISCHARGE NOTE ADULT - CARE PLAN
Principal Discharge DX:	Acute respiratory failure with hypoxia and hypercapnia  Goal:	follow up with pulmonary  Assessment and plan of treatment:	trilogy machine at night   c/w inhaler   home o2  Secondary Diagnosis:	Elevated troponin  Goal:	follow up with pcp  Secondary Diagnosis:	Schizophrenia  Goal:	c/w meds

## 2018-01-23 DIAGNOSIS — D63.8 ANEMIA IN OTHER CHRONIC DISEASES CLASSIFIED ELSEWHERE: ICD-10-CM

## 2018-01-23 DIAGNOSIS — F25.9 SCHIZOAFFECTIVE DISORDER, UNSPECIFIED: ICD-10-CM

## 2018-01-23 DIAGNOSIS — J47.9 BRONCHIECTASIS, UNCOMPLICATED: ICD-10-CM

## 2018-01-23 DIAGNOSIS — J44.9 CHRONIC OBSTRUCTIVE PULMONARY DISEASE, UNSPECIFIED: ICD-10-CM

## 2018-01-23 DIAGNOSIS — J98.11 ATELECTASIS: ICD-10-CM

## 2018-01-23 DIAGNOSIS — I24.9 ACUTE ISCHEMIC HEART DISEASE, UNSPECIFIED: ICD-10-CM

## 2018-01-23 DIAGNOSIS — A41.9 SEPSIS, UNSPECIFIED ORGANISM: ICD-10-CM

## 2018-01-23 DIAGNOSIS — R33.9 RETENTION OF URINE, UNSPECIFIED: ICD-10-CM

## 2018-01-23 DIAGNOSIS — F84.0 AUTISTIC DISORDER: ICD-10-CM

## 2018-01-23 DIAGNOSIS — E87.4 MIXED DISORDER OF ACID-BASE BALANCE: ICD-10-CM

## 2018-01-23 DIAGNOSIS — J15.6 PNEUMONIA DUE TO OTHER GRAM-NEGATIVE BACTERIA: ICD-10-CM

## 2018-01-23 DIAGNOSIS — J84.10 PULMONARY FIBROSIS, UNSPECIFIED: ICD-10-CM

## 2018-01-23 DIAGNOSIS — J96.01 ACUTE RESPIRATORY FAILURE WITH HYPOXIA: ICD-10-CM

## 2018-01-23 DIAGNOSIS — E87.6 HYPOKALEMIA: ICD-10-CM

## 2018-01-23 DIAGNOSIS — R19.7 DIARRHEA, UNSPECIFIED: ICD-10-CM

## 2018-01-23 DIAGNOSIS — R29.810 FACIAL WEAKNESS: ICD-10-CM

## 2018-01-23 DIAGNOSIS — J96.22 ACUTE AND CHRONIC RESPIRATORY FAILURE WITH HYPERCAPNIA: ICD-10-CM

## 2018-01-23 DIAGNOSIS — B97.4 RESPIRATORY SYNCYTIAL VIRUS AS THE CAUSE OF DISEASES CLASSIFIED ELSEWHERE: ICD-10-CM

## 2018-01-23 DIAGNOSIS — E87.0 HYPEROSMOLALITY AND HYPERNATREMIA: ICD-10-CM

## 2018-01-23 DIAGNOSIS — G93.41 METABOLIC ENCEPHALOPATHY: ICD-10-CM

## 2018-01-23 DIAGNOSIS — E43 UNSPECIFIED SEVERE PROTEIN-CALORIE MALNUTRITION: ICD-10-CM

## 2018-05-25 ENCOUNTER — EMERGENCY (EMERGENCY)
Facility: HOSPITAL | Age: 58
LOS: 0 days | Discharge: HOME | End: 2018-05-25
Attending: EMERGENCY MEDICINE
Payer: COMMERCIAL

## 2018-05-25 VITALS
SYSTOLIC BLOOD PRESSURE: 108 MMHG | HEART RATE: 83 BPM | TEMPERATURE: 99 F | RESPIRATION RATE: 18 BRPM | OXYGEN SATURATION: 93 % | DIASTOLIC BLOOD PRESSURE: 72 MMHG

## 2018-05-25 VITALS
DIASTOLIC BLOOD PRESSURE: 62 MMHG | SYSTOLIC BLOOD PRESSURE: 115 MMHG | OXYGEN SATURATION: 94 % | TEMPERATURE: 99 F | RESPIRATION RATE: 18 BRPM | HEART RATE: 78 BPM

## 2018-05-25 DIAGNOSIS — F20.9 SCHIZOPHRENIA, UNSPECIFIED: ICD-10-CM

## 2018-05-25 DIAGNOSIS — J40 BRONCHITIS, NOT SPECIFIED AS ACUTE OR CHRONIC: ICD-10-CM

## 2018-05-25 DIAGNOSIS — Z79.51 LONG TERM (CURRENT) USE OF INHALED STEROIDS: ICD-10-CM

## 2018-05-25 DIAGNOSIS — J45.909 UNSPECIFIED ASTHMA, UNCOMPLICATED: ICD-10-CM

## 2018-05-25 DIAGNOSIS — Z79.82 LONG TERM (CURRENT) USE OF ASPIRIN: ICD-10-CM

## 2018-05-25 DIAGNOSIS — R05 COUGH: ICD-10-CM

## 2018-05-25 DIAGNOSIS — R09.02 HYPOXEMIA: ICD-10-CM

## 2018-05-25 DIAGNOSIS — F84.0 AUTISTIC DISORDER: ICD-10-CM

## 2018-05-25 DIAGNOSIS — R06.02 SHORTNESS OF BREATH: ICD-10-CM

## 2018-05-25 LAB
ALBUMIN SERPL ELPH-MCNC: 3 G/DL — LOW (ref 3.3–5)
ALP SERPL-CCNC: 87 U/L — SIGNIFICANT CHANGE UP (ref 40–120)
ALT FLD-CCNC: 19 U/L — SIGNIFICANT CHANGE UP (ref 12–78)
ANION GAP SERPL CALC-SCNC: 7 MMOL/L — SIGNIFICANT CHANGE UP (ref 5–17)
APTT BLD: 37.5 SEC — HIGH (ref 27.5–37.4)
AST SERPL-CCNC: 13 U/L — LOW (ref 15–37)
BASOPHILS # BLD AUTO: 0.03 K/UL — SIGNIFICANT CHANGE UP (ref 0–0.2)
BASOPHILS NFR BLD AUTO: 0.3 % — SIGNIFICANT CHANGE UP (ref 0–2)
BILIRUB SERPL-MCNC: 0.3 MG/DL — SIGNIFICANT CHANGE UP (ref 0.2–1.2)
BUN SERPL-MCNC: 9 MG/DL — SIGNIFICANT CHANGE UP (ref 7–23)
CALCIUM SERPL-MCNC: 8.2 MG/DL — LOW (ref 8.5–10.1)
CHLORIDE SERPL-SCNC: 106 MMOL/L — SIGNIFICANT CHANGE UP (ref 96–108)
CK MB BLD-MCNC: 1.1 % — SIGNIFICANT CHANGE UP (ref 0–3.5)
CK MB CFR SERPL CALC: 0.6 NG/ML — SIGNIFICANT CHANGE UP (ref 0.5–3.6)
CK SERPL-CCNC: 55 U/L — SIGNIFICANT CHANGE UP (ref 26–192)
CO2 SERPL-SCNC: 30 MMOL/L — SIGNIFICANT CHANGE UP (ref 22–31)
CREAT SERPL-MCNC: 0.68 MG/DL — SIGNIFICANT CHANGE UP (ref 0.5–1.3)
EOSINOPHIL # BLD AUTO: 0.12 K/UL — SIGNIFICANT CHANGE UP (ref 0–0.5)
EOSINOPHIL NFR BLD AUTO: 1.4 % — SIGNIFICANT CHANGE UP (ref 0–6)
GLUCOSE SERPL-MCNC: 80 MG/DL — SIGNIFICANT CHANGE UP (ref 70–99)
HCT VFR BLD CALC: 35.4 % — SIGNIFICANT CHANGE UP (ref 34.5–45)
HGB BLD-MCNC: 10.6 G/DL — LOW (ref 11.5–15.5)
IMM GRANULOCYTES NFR BLD AUTO: 0.3 % — SIGNIFICANT CHANGE UP (ref 0–1.5)
INR BLD: 1.02 RATIO — SIGNIFICANT CHANGE UP (ref 0.88–1.16)
LYMPHOCYTES # BLD AUTO: 3.11 K/UL — SIGNIFICANT CHANGE UP (ref 1–3.3)
LYMPHOCYTES # BLD AUTO: 36.1 % — SIGNIFICANT CHANGE UP (ref 13–44)
MCHC RBC-ENTMCNC: 28.5 PG — SIGNIFICANT CHANGE UP (ref 27–34)
MCHC RBC-ENTMCNC: 29.9 GM/DL — LOW (ref 32–36)
MCV RBC AUTO: 95.2 FL — SIGNIFICANT CHANGE UP (ref 80–100)
MONOCYTES # BLD AUTO: 0.61 K/UL — SIGNIFICANT CHANGE UP (ref 0–0.9)
MONOCYTES NFR BLD AUTO: 7.1 % — SIGNIFICANT CHANGE UP (ref 2–14)
NEUTROPHILS # BLD AUTO: 4.72 K/UL — SIGNIFICANT CHANGE UP (ref 1.8–7.4)
NEUTROPHILS NFR BLD AUTO: 54.8 % — SIGNIFICANT CHANGE UP (ref 43–77)
NRBC # BLD: 0 /100 WBCS — SIGNIFICANT CHANGE UP (ref 0–0)
NT-PROBNP SERPL-SCNC: 52 PG/ML — SIGNIFICANT CHANGE UP (ref 0–125)
PLATELET # BLD AUTO: 194 K/UL — SIGNIFICANT CHANGE UP (ref 150–400)
POTASSIUM SERPL-MCNC: 3.8 MMOL/L — SIGNIFICANT CHANGE UP (ref 3.5–5.3)
POTASSIUM SERPL-SCNC: 3.8 MMOL/L — SIGNIFICANT CHANGE UP (ref 3.5–5.3)
PROT SERPL-MCNC: 7.5 GM/DL — SIGNIFICANT CHANGE UP (ref 6–8.3)
PROTHROM AB SERPL-ACNC: 11.1 SEC — SIGNIFICANT CHANGE UP (ref 9.8–12.7)
RBC # BLD: 3.72 M/UL — LOW (ref 3.8–5.2)
RBC # FLD: 15.2 % — HIGH (ref 10.3–14.5)
SODIUM SERPL-SCNC: 143 MMOL/L — SIGNIFICANT CHANGE UP (ref 135–145)
TROPONIN I SERPL-MCNC: <.015 NG/ML — SIGNIFICANT CHANGE UP (ref 0.01–0.04)
WBC # BLD: 8.62 K/UL — SIGNIFICANT CHANGE UP (ref 3.8–10.5)
WBC # FLD AUTO: 8.62 K/UL — SIGNIFICANT CHANGE UP (ref 3.8–10.5)

## 2018-05-25 PROCEDURE — 71045 X-RAY EXAM CHEST 1 VIEW: CPT | Mod: 26

## 2018-05-25 PROCEDURE — 99284 EMERGENCY DEPT VISIT MOD MDM: CPT

## 2018-05-25 RX ORDER — AZITHROMYCIN 500 MG/1
1 TABLET, FILM COATED ORAL
Qty: 4 | Refills: 0 | OUTPATIENT
Start: 2018-05-25 | End: 2018-05-28

## 2018-05-25 RX ORDER — AZITHROMYCIN 500 MG/1
500 TABLET, FILM COATED ORAL ONCE
Qty: 0 | Refills: 0 | Status: COMPLETED | OUTPATIENT
Start: 2018-05-25 | End: 2018-05-25

## 2018-05-25 RX ADMIN — AZITHROMYCIN 500 MILLIGRAM(S): 500 TABLET, FILM COATED ORAL at 22:15

## 2018-05-25 RX ADMIN — Medication 20 MILLIGRAM(S): at 22:15

## 2018-05-25 NOTE — ED ADULT TRIAGE NOTE - CHIEF COMPLAINT QUOTE
pt BIBA for chest congestion, cough and shortness of breath since this am. history of schizophrenia and collapsed lung a few years ago. on home o2

## 2018-05-25 NOTE — ED PROVIDER NOTE - MEDICAL DECISION MAKING DETAILS
bronchitis noted to dc with follow up with PMD in 2-3 days. Pt not in distress, eating and drinking with mother.

## 2018-05-25 NOTE — ED PROVIDER NOTE - EYES NEGATIVE STATEMENT, MLM
Patient with a little change subjectively and objectively from yesterday. No bowel movement. Is taking MiraLAX. At this time I am going to sign off. Please feel free to reconsult if needed. At this point the most important issues from a neurological standpoint is to try wean off Krueger catheter, ensure good bowel motility, and increase the patient's time out of bed trying to liberalize her activity to prevent complications and begin the rehabilitation process. no discharge, no irritation, no pain, no redness, and no visual changes.

## 2018-05-25 NOTE — ED PROVIDER NOTE - OBJECTIVE STATEMENT
57 year old female with PMH of schizophrenia, asthma hx, presenting due to cough x 3-4 weeks with congestion, pt also has hx of tuberculosis, autims with collapsed lung on R without resection - R lung has remained that way. Otherwise SOB not significantly increased vs normal. No fever/chills.

## 2018-05-26 PROBLEM — A15.9 RESPIRATORY TUBERCULOSIS UNSPECIFIED: Chronic | Status: ACTIVE | Noted: 2018-01-09

## 2018-05-26 PROBLEM — F20.9 SCHIZOPHRENIA, UNSPECIFIED: Chronic | Status: ACTIVE | Noted: 2018-01-09

## 2018-10-08 ENCOUNTER — INPATIENT (INPATIENT)
Facility: HOSPITAL | Age: 58
LOS: 6 days | Discharge: HOME | End: 2018-10-15
Attending: INTERNAL MEDICINE | Admitting: INTERNAL MEDICINE
Payer: MEDICAID

## 2018-10-08 VITALS
DIASTOLIC BLOOD PRESSURE: 79 MMHG | HEIGHT: 64 IN | WEIGHT: 202.83 LBS | HEART RATE: 89 BPM | OXYGEN SATURATION: 98 % | SYSTOLIC BLOOD PRESSURE: 159 MMHG | RESPIRATION RATE: 19 BRPM | TEMPERATURE: 100 F

## 2018-10-08 DIAGNOSIS — Z79.2 LONG TERM (CURRENT) USE OF ANTIBIOTICS: ICD-10-CM

## 2018-10-08 DIAGNOSIS — J96.21 ACUTE AND CHRONIC RESPIRATORY FAILURE WITH HYPOXIA: ICD-10-CM

## 2018-10-08 DIAGNOSIS — F20.9 SCHIZOPHRENIA, UNSPECIFIED: ICD-10-CM

## 2018-10-08 DIAGNOSIS — Z79.52 LONG TERM (CURRENT) USE OF SYSTEMIC STEROIDS: ICD-10-CM

## 2018-10-08 DIAGNOSIS — J15.6 PNEUMONIA DUE TO OTHER GRAM-NEGATIVE BACTERIA: ICD-10-CM

## 2018-10-08 DIAGNOSIS — Z79.82 LONG TERM (CURRENT) USE OF ASPIRIN: ICD-10-CM

## 2018-10-08 DIAGNOSIS — F32.9 MAJOR DEPRESSIVE DISORDER, SINGLE EPISODE, UNSPECIFIED: ICD-10-CM

## 2018-10-08 DIAGNOSIS — R06.89 OTHER ABNORMALITIES OF BREATHING: ICD-10-CM

## 2018-10-08 DIAGNOSIS — Z28.21 IMMUNIZATION NOT CARRIED OUT BECAUSE OF PATIENT REFUSAL: ICD-10-CM

## 2018-10-08 DIAGNOSIS — J96.22 ACUTE AND CHRONIC RESPIRATORY FAILURE WITH HYPERCAPNIA: ICD-10-CM

## 2018-10-08 DIAGNOSIS — J18.9 PNEUMONIA, UNSPECIFIED ORGANISM: ICD-10-CM

## 2018-10-08 DIAGNOSIS — J44.1 CHRONIC OBSTRUCTIVE PULMONARY DISEASE WITH (ACUTE) EXACERBATION: ICD-10-CM

## 2018-10-08 DIAGNOSIS — I25.10 ATHEROSCLEROTIC HEART DISEASE OF NATIVE CORONARY ARTERY WITHOUT ANGINA PECTORIS: ICD-10-CM

## 2018-10-08 DIAGNOSIS — B34.1 ENTEROVIRUS INFECTION, UNSPECIFIED: ICD-10-CM

## 2018-10-08 LAB
ALBUMIN SERPL ELPH-MCNC: 2.9 G/DL — LOW (ref 3.3–5)
ALP SERPL-CCNC: 81 U/L — SIGNIFICANT CHANGE UP (ref 40–120)
ALT FLD-CCNC: 15 U/L — SIGNIFICANT CHANGE UP (ref 12–78)
ANION GAP SERPL CALC-SCNC: 5 MMOL/L — SIGNIFICANT CHANGE UP (ref 5–17)
APPEARANCE UR: CLEAR — SIGNIFICANT CHANGE UP
APTT BLD: 27.7 SEC — SIGNIFICANT CHANGE UP (ref 27.5–37.4)
AST SERPL-CCNC: 13 U/L — LOW (ref 15–37)
BASE EXCESS BLDA CALC-SCNC: 10.8 MMOL/L — HIGH (ref -2–2)
BASOPHILS # BLD AUTO: 0.02 K/UL — SIGNIFICANT CHANGE UP (ref 0–0.2)
BASOPHILS NFR BLD AUTO: 0.2 % — SIGNIFICANT CHANGE UP (ref 0–2)
BILIRUB SERPL-MCNC: 0.8 MG/DL — SIGNIFICANT CHANGE UP (ref 0.2–1.2)
BILIRUB UR-MCNC: NEGATIVE — SIGNIFICANT CHANGE UP
BLOOD GAS COMMENTS: SIGNIFICANT CHANGE UP
BLOOD GAS COMMENTS: SIGNIFICANT CHANGE UP
BLOOD GAS SOURCE: SIGNIFICANT CHANGE UP
BUN SERPL-MCNC: 13 MG/DL — SIGNIFICANT CHANGE UP (ref 7–23)
CALCIUM SERPL-MCNC: 8.2 MG/DL — LOW (ref 8.5–10.1)
CHLORIDE SERPL-SCNC: 96 MMOL/L — SIGNIFICANT CHANGE UP (ref 96–108)
CK MB CFR SERPL CALC: <1 NG/ML — SIGNIFICANT CHANGE UP (ref 0.5–3.6)
CO2 SERPL-SCNC: 38 MMOL/L — HIGH (ref 22–31)
COLOR SPEC: YELLOW — SIGNIFICANT CHANGE UP
CREAT SERPL-MCNC: 0.73 MG/DL — SIGNIFICANT CHANGE UP (ref 0.5–1.3)
DIFF PNL FLD: ABNORMAL
EOSINOPHIL # BLD AUTO: 0.03 K/UL — SIGNIFICANT CHANGE UP (ref 0–0.5)
EOSINOPHIL NFR BLD AUTO: 0.3 % — SIGNIFICANT CHANGE UP (ref 0–6)
FLUAV SPEC QL CULT: NEGATIVE — SIGNIFICANT CHANGE UP
FLUBV AG SPEC QL IA: NEGATIVE — SIGNIFICANT CHANGE UP
GLUCOSE SERPL-MCNC: 117 MG/DL — HIGH (ref 70–99)
GLUCOSE UR QL: NEGATIVE MG/DL — SIGNIFICANT CHANGE UP
HCO3 BLDA-SCNC: 38 MMOL/L — HIGH (ref 21–29)
HCT VFR BLD CALC: 35.4 % — SIGNIFICANT CHANGE UP (ref 34.5–45)
HGB BLD-MCNC: 10.5 G/DL — LOW (ref 11.5–15.5)
HOROWITZ INDEX BLDA+IHG-RTO: 100 — SIGNIFICANT CHANGE UP
IMM GRANULOCYTES NFR BLD AUTO: 0.8 % — SIGNIFICANT CHANGE UP (ref 0–1.5)
INR BLD: 1.31 RATIO — HIGH (ref 0.88–1.16)
KETONES UR-MCNC: NEGATIVE — SIGNIFICANT CHANGE UP
LACTATE SERPL-SCNC: 0.6 MMOL/L — LOW (ref 0.7–2)
LEUKOCYTE ESTERASE UR-ACNC: ABNORMAL
LYMPHOCYTES # BLD AUTO: 1.66 K/UL — SIGNIFICANT CHANGE UP (ref 1–3.3)
LYMPHOCYTES # BLD AUTO: 16.6 % — SIGNIFICANT CHANGE UP (ref 13–44)
MAGNESIUM SERPL-MCNC: 2.5 MG/DL — SIGNIFICANT CHANGE UP (ref 1.6–2.6)
MCHC RBC-ENTMCNC: 27.3 PG — SIGNIFICANT CHANGE UP (ref 27–34)
MCHC RBC-ENTMCNC: 29.7 GM/DL — LOW (ref 32–36)
MCV RBC AUTO: 91.9 FL — SIGNIFICANT CHANGE UP (ref 80–100)
MONOCYTES # BLD AUTO: 0.86 K/UL — SIGNIFICANT CHANGE UP (ref 0–0.9)
MONOCYTES NFR BLD AUTO: 8.6 % — SIGNIFICANT CHANGE UP (ref 2–14)
NEUTROPHILS # BLD AUTO: 7.35 K/UL — SIGNIFICANT CHANGE UP (ref 1.8–7.4)
NEUTROPHILS NFR BLD AUTO: 73.5 % — SIGNIFICANT CHANGE UP (ref 43–77)
NITRITE UR-MCNC: NEGATIVE — SIGNIFICANT CHANGE UP
NT-PROBNP SERPL-SCNC: 430 PG/ML — HIGH (ref 0–125)
PCO2 BLDA: 67 MMHG — HIGH (ref 32–46)
PH BLD: 7.37 — SIGNIFICANT CHANGE UP (ref 7.35–7.45)
PH UR: 6 — SIGNIFICANT CHANGE UP (ref 5–8)
PLATELET # BLD AUTO: 195 K/UL — SIGNIFICANT CHANGE UP (ref 150–400)
PO2 BLDA: 185 MMHG — HIGH (ref 74–108)
POTASSIUM SERPL-MCNC: 4 MMOL/L — SIGNIFICANT CHANGE UP (ref 3.5–5.3)
POTASSIUM SERPL-SCNC: 4 MMOL/L — SIGNIFICANT CHANGE UP (ref 3.5–5.3)
PROT SERPL-MCNC: 8 GM/DL — SIGNIFICANT CHANGE UP (ref 6–8.3)
PROT UR-MCNC: 30 MG/DL
PROTHROM AB SERPL-ACNC: 14.4 SEC — HIGH (ref 9.8–12.7)
RBC # BLD: 3.85 M/UL — SIGNIFICANT CHANGE UP (ref 3.8–5.2)
RBC # FLD: 16.4 % — HIGH (ref 10.3–14.5)
SAO2 % BLDA: 98 % — HIGH (ref 92–96)
SODIUM SERPL-SCNC: 139 MMOL/L — SIGNIFICANT CHANGE UP (ref 135–145)
SP GR SPEC: 1.01 — SIGNIFICANT CHANGE UP (ref 1.01–1.02)
TROPONIN I SERPL-MCNC: <.015 NG/ML — SIGNIFICANT CHANGE UP (ref 0.01–0.04)
UROBILINOGEN FLD QL: NEGATIVE MG/DL — SIGNIFICANT CHANGE UP
WBC # BLD: 10 K/UL — SIGNIFICANT CHANGE UP (ref 3.8–10.5)
WBC # FLD AUTO: 10 K/UL — SIGNIFICANT CHANGE UP (ref 3.8–10.5)

## 2018-10-08 PROCEDURE — 93010 ELECTROCARDIOGRAM REPORT: CPT

## 2018-10-08 PROCEDURE — 99291 CRITICAL CARE FIRST HOUR: CPT

## 2018-10-08 PROCEDURE — 99223 1ST HOSP IP/OBS HIGH 75: CPT

## 2018-10-08 PROCEDURE — 71045 X-RAY EXAM CHEST 1 VIEW: CPT | Mod: 26

## 2018-10-08 RX ORDER — PIPERACILLIN AND TAZOBACTAM 4; .5 G/20ML; G/20ML
3.38 INJECTION, POWDER, LYOPHILIZED, FOR SOLUTION INTRAVENOUS EVERY 8 HOURS
Qty: 0 | Refills: 0 | Status: DISCONTINUED | OUTPATIENT
Start: 2018-10-08 | End: 2018-10-11

## 2018-10-08 RX ORDER — DIVALPROEX SODIUM 500 MG/1
500 TABLET, DELAYED RELEASE ORAL
Qty: 0 | Refills: 0 | Status: DISCONTINUED | OUTPATIENT
Start: 2018-10-08 | End: 2018-10-15

## 2018-10-08 RX ORDER — SERTRALINE 25 MG/1
100 TABLET, FILM COATED ORAL DAILY
Qty: 0 | Refills: 0 | Status: DISCONTINUED | OUTPATIENT
Start: 2018-10-08 | End: 2018-10-15

## 2018-10-08 RX ORDER — TIOTROPIUM BROMIDE 18 UG/1
1 CAPSULE ORAL; RESPIRATORY (INHALATION) DAILY
Qty: 0 | Refills: 0 | Status: DISCONTINUED | OUTPATIENT
Start: 2018-10-08 | End: 2018-10-15

## 2018-10-08 RX ORDER — ARIPIPRAZOLE 15 MG/1
10 TABLET ORAL DAILY
Qty: 0 | Refills: 0 | Status: DISCONTINUED | OUTPATIENT
Start: 2018-10-08 | End: 2018-10-09

## 2018-10-08 RX ORDER — ACETAMINOPHEN 500 MG
650 TABLET ORAL ONCE
Qty: 0 | Refills: 0 | Status: COMPLETED | OUTPATIENT
Start: 2018-10-08 | End: 2018-10-08

## 2018-10-08 RX ORDER — PIPERACILLIN AND TAZOBACTAM 4; .5 G/20ML; G/20ML
3.38 INJECTION, POWDER, LYOPHILIZED, FOR SOLUTION INTRAVENOUS ONCE
Qty: 0 | Refills: 0 | Status: COMPLETED | OUTPATIENT
Start: 2018-10-08 | End: 2018-10-08

## 2018-10-08 RX ORDER — IPRATROPIUM/ALBUTEROL SULFATE 18-103MCG
3 AEROSOL WITH ADAPTER (GRAM) INHALATION EVERY 6 HOURS
Qty: 0 | Refills: 0 | Status: DISCONTINUED | OUTPATIENT
Start: 2018-10-08 | End: 2018-10-15

## 2018-10-08 RX ORDER — ASPIRIN/CALCIUM CARB/MAGNESIUM 324 MG
81 TABLET ORAL DAILY
Qty: 0 | Refills: 0 | Status: DISCONTINUED | OUTPATIENT
Start: 2018-10-08 | End: 2018-10-15

## 2018-10-08 RX ORDER — BENZTROPINE MESYLATE 1 MG
2 TABLET ORAL
Qty: 0 | Refills: 0 | Status: DISCONTINUED | OUTPATIENT
Start: 2018-10-08 | End: 2018-10-15

## 2018-10-08 RX ORDER — VANCOMYCIN HCL 1 G
1000 VIAL (EA) INTRAVENOUS ONCE
Qty: 0 | Refills: 0 | Status: COMPLETED | OUTPATIENT
Start: 2018-10-08 | End: 2018-10-08

## 2018-10-08 RX ORDER — ALBUTEROL 90 UG/1
1 AEROSOL, METERED ORAL EVERY 4 HOURS
Qty: 0 | Refills: 0 | Status: DISCONTINUED | OUTPATIENT
Start: 2018-10-08 | End: 2018-10-15

## 2018-10-08 RX ORDER — ACETAMINOPHEN 500 MG
325 TABLET ORAL EVERY 4 HOURS
Qty: 0 | Refills: 0 | Status: DISCONTINUED | OUTPATIENT
Start: 2018-10-08 | End: 2018-10-15

## 2018-10-08 RX ORDER — IPRATROPIUM/ALBUTEROL SULFATE 18-103MCG
3 AEROSOL WITH ADAPTER (GRAM) INHALATION ONCE
Qty: 0 | Refills: 0 | Status: COMPLETED | OUTPATIENT
Start: 2018-10-08 | End: 2018-10-08

## 2018-10-08 RX ORDER — OLANZAPINE 15 MG/1
10 TABLET, FILM COATED ORAL
Qty: 0 | Refills: 0 | Status: DISCONTINUED | OUTPATIENT
Start: 2018-10-08 | End: 2018-10-15

## 2018-10-08 RX ORDER — MAGNESIUM SULFATE 500 MG/ML
2 VIAL (ML) INJECTION ONCE
Qty: 0 | Refills: 0 | Status: COMPLETED | OUTPATIENT
Start: 2018-10-08 | End: 2018-10-08

## 2018-10-08 RX ADMIN — Medication 2 GRAM(S): at 11:15

## 2018-10-08 RX ADMIN — Medication 650 MILLIGRAM(S): at 10:28

## 2018-10-08 RX ADMIN — DIVALPROEX SODIUM 500 MILLIGRAM(S): 500 TABLET, DELAYED RELEASE ORAL at 21:54

## 2018-10-08 RX ADMIN — SERTRALINE 100 MILLIGRAM(S): 25 TABLET, FILM COATED ORAL at 21:06

## 2018-10-08 RX ADMIN — Medication 3 MILLILITER(S): at 17:18

## 2018-10-08 RX ADMIN — Medication 125 MILLIGRAM(S): at 10:36

## 2018-10-08 RX ADMIN — Medication 2 MILLIGRAM(S): at 21:05

## 2018-10-08 RX ADMIN — Medication 250 MILLIGRAM(S): at 11:24

## 2018-10-08 RX ADMIN — PIPERACILLIN AND TAZOBACTAM 3.38 GRAM(S): 4; .5 INJECTION, POWDER, LYOPHILIZED, FOR SOLUTION INTRAVENOUS at 11:24

## 2018-10-08 RX ADMIN — ARIPIPRAZOLE 10 MILLIGRAM(S): 15 TABLET ORAL at 21:55

## 2018-10-08 RX ADMIN — OLANZAPINE 10 MILLIGRAM(S): 15 TABLET, FILM COATED ORAL at 21:55

## 2018-10-08 RX ADMIN — Medication 3 MILLILITER(S): at 10:28

## 2018-10-08 RX ADMIN — Medication 50 GRAM(S): at 10:36

## 2018-10-08 RX ADMIN — Medication 650 MILLIGRAM(S): at 11:31

## 2018-10-08 RX ADMIN — PIPERACILLIN AND TAZOBACTAM 200 GRAM(S): 4; .5 INJECTION, POWDER, LYOPHILIZED, FOR SOLUTION INTRAVENOUS at 10:36

## 2018-10-08 RX ADMIN — PIPERACILLIN AND TAZOBACTAM 25 GRAM(S): 4; .5 INJECTION, POWDER, LYOPHILIZED, FOR SOLUTION INTRAVENOUS at 21:05

## 2018-10-08 RX ADMIN — Medication 40 MILLIGRAM(S): at 21:05

## 2018-10-08 NOTE — ED PROVIDER NOTE - MEDICAL DECISION MAKING DETAILS
sob, cough, fever, no other tb symptoms, abx, copd meds (possible copd documented when admitted last), bipap, tylenol. currently on airborne precautions until can be further evaluated though lower suspicion of active tb at this time.

## 2018-10-08 NOTE — H&P ADULT - NSHPLABSRESULTS_GEN_ALL_CORE
10.5   10.00 )-----------( 195      ( 08 Oct 2018 10:47 )             35.4   10-08    139  |  96  |  13  ----------------------------<  117<H>  4.0   |  38<H>  |  0.73    Ca    8.2<L>      08 Oct 2018 10:47  Mg     2.5     10-08    TPro  8.0  /  Alb  2.9<L>  /  TBili  0.8  /  DBili  x   /  AST  13<L>  /  ALT  15  /  AlkPhos  81  10-08  < from: Xray Chest 1 View- PORTABLE-Urgent (10.08.18 @ 11:12) >    Chronic post-inflammatory right hemithorax changes - are stable.  Left lung is clear.        < end of copied text >

## 2018-10-08 NOTE — ED PROVIDER NOTE - PHYSICAL EXAMINATION
Gen: respiratory distress, audible wet sounding breaths with some wheezing.   HEENT: Mucous membranes moist, pink conjunctivae, EOMI  CV: RRR, nl s1/s2.  Resp: labored, short sentences, audible wet sounding breaths.   GI: Abdomen soft, NT, ND. No rebound, no guarding  : No CVAT  Neuro: A&O x 3, moving all 4 extremities  MSK: No spine or joint tenderness to palpation  Skin: No rashes. intact and perfused.

## 2018-10-08 NOTE — H&P ADULT - NSHPREVIEWOFSYSTEMS_GEN_ALL_CORE

## 2018-10-08 NOTE — ED ADULT TRIAGE NOTE - CHIEF COMPLAINT QUOTE
patient BIBA , c/o of difficulty breathing for 2 weeks , c/o of fever and cough for 1 week , patient c/o of chest pain and back pain when cough , history of TB

## 2018-10-08 NOTE — ED ADULT NURSE NOTE - OBJECTIVE STATEMENT
PT presented to the ed c/o of fever x 2 weeks SOB, cough. Pt has hx of TB. droplet precaution initiated. Pt is hypoxic in the ed.

## 2018-10-08 NOTE — ED ADULT NURSE NOTE - NSIMPLEMENTINTERV_GEN_ALL_ED
Implemented All Universal Safety Interventions:  [place] to call system. Call bell, personal items and telephone within reach. Instruct patient to call for assistance. Room bathroom lighting operational. Non-slip footwear when patient is off stretcher. Physically safe environment: no spills, clutter or unnecessary equipment. Stretcher in lowest position, wheels locked, appropriate side rails in place.

## 2018-10-08 NOTE — CONSULT NOTE ADULT - SUBJECTIVE AND OBJECTIVE BOX
Patient is a 57y old  Female who presents with a chief complaint of difficulty breathing    HPI:  56 yo female w/schizophrenia, autism, h/o TB and untreated for a long time, R lung collapse, bronchiectasis, c/o 2 weeks of sob and cough, worse when laying down. Was admitted to ICU earlier this year on bipap for respiratory failure in setting of positive rvp. TB >10 years ago, no weight loss, night sweats, hemoptysis, denies any chest pain currently. In resp distress on eval with wet sounding breaths, improved with oxygen.  the fever began two days (08 Oct 2018 16:33)      Consultation called by ER doctor for respiratory distress.  Pt is a poor historian and unable to provide much information.  After speaking with sister Freddie Clark at [phone], pt has been not breathing well for past few days and had fever.  Denies any other complaints.  Sister concerned primarily because pt has h/o abnormal lung pathology due to TB in past.  On arrival to ER, pt was noted to be tachypneic and on verge of intubation.  Pt put on bipap and has since improved. On exam in ER, pt appeared comfortable on bipap.  Ventimask was attempted however pt's oxygen saturation was only as high as low 90's.  Pt tolerating high flow oxygen with saturation in mid to high 90's.  Sister confirms pt has COPD    Allergies    No Known Allergies    Intolerances        MEDICATIONS  (STANDING):  ALBUTerol    90 MICROgram(s) HFA Inhaler 1 Puff(s) Inhalation every 4 hours  ALBUTerol/ipratropium for Nebulization 3 milliLiter(s) Nebulizer every 6 hours  ARIPiprazole Disintegrating Tablet 10 milliGRAM(s) Oral daily  aspirin enteric coated 81 milliGRAM(s) Oral daily  benztropine 2 milliGRAM(s) Oral two times a day  diVALproex  milliGRAM(s) Oral two times a day  methylPREDNISolone sodium succinate Injectable 40 milliGRAM(s) IV Push every 8 hours  piperacillin/tazobactam IVPB. 3.375 Gram(s) IV Intermittent every 8 hours  sertraline 100 milliGRAM(s) Oral daily  tiotropium 18 MICROgram(s) Capsule 1 Capsule(s) Inhalation daily    MEDICATIONS  (PRN):  acetaminophen   Tablet .. 325 milliGRAM(s) Oral every 4 hours PRN Temp greater or equal to 38C (100.4F), Mild Pain (1 - 3)      Daily Height in cm: 162.56 (08 Oct 2018 09:39)    Daily     Drug Dosing Weight  Height (cm): 162.56 (08 Oct 2018 09:39)  Weight (kg): 92 (08 Oct 2018 09:39)  BMI (kg/m2): 34.8 (08 Oct 2018 09:39)  BSA (m2): 1.97 (08 Oct 2018 09:39)    PAST MEDICAL & SURGICAL HISTORY:  Schizophrenia  Tuberculosis  Lung disease  Autism  Hypoxia  No significant past surgical history      FAMILY HISTORY:  No pertinent family history in first degree relatives      SOCIAL HISTORY:    ADVANCE DIRECTIVES:    REVIEW OF SYSTEMS:    as stated per HPI        ICU Vital Signs Last 24 Hrs  T(C): 37.2 (08 Oct 2018 16:07), Max: 39.1 (08 Oct 2018 10:13)  T(F): 99 (08 Oct 2018 16:07), Max: 102.3 (08 Oct 2018 10:13)  HR: 72 (08 Oct 2018 16:07) (70 - 89)  BP: 123/67 (08 Oct 2018 16:07) (101/60 - 159/79)  BP(mean): --  ABP: --  ABP(mean): --  RR: 19 (08 Oct 2018 16:07) (19 - 20)  SpO2: 96% (08 Oct 2018 16:07) (96% - 100%)      ABG - ( 08 Oct 2018 11:25 )  pH, Arterial: x     pH, Blood: 7.37  /  pCO2: 67    /  pO2: 185   / HCO3: 38    / Base Excess: 10.8  /  SaO2: 98                  I&O's Detail      PHYSICAL EXAM:    GENERAL: NAD, well-groomed, well-developed  HEAD:  Atraumatic, Normocephalic  EYES: conjunctiva and sclera clear  NERVOUS SYSTEM:  Alert & Oriented X3,   CHEST/LUNG: coarse rhonchi bilaterally  HEART: Regular rate and rhythm;   ABDOMEN: Soft, Nontender, Nondistended; Bowel sounds present  EXTREMITIES: No clubbing, cyanosis, or edema      LABS:  CBC Full  -  ( 08 Oct 2018 10:47 )  WBC Count : 10.00 K/uL  Hemoglobin : 10.5 g/dL  Hematocrit : 35.4 %  Platelet Count - Automated : 195 K/uL  Mean Cell Volume : 91.9 fl  Mean Cell Hemoglobin : 27.3 pg  Mean Cell Hemoglobin Concentration : 29.7 gm/dL  Auto Neutrophil # : 7.35 K/uL  Auto Lymphocyte # : 1.66 K/uL  Auto Monocyte # : 0.86 K/uL  Auto Eosinophil # : 0.03 K/uL  Auto Basophil # : 0.02 K/uL  Auto Neutrophil % : 73.5 %  Auto Lymphocyte % : 16.6 %  Auto Monocyte % : 8.6 %  Auto Eosinophil % : 0.3 %  Auto Basophil % : 0.2 %    10-08    139  |  96  |  13  ----------------------------<  117<H>  4.0   |  38<H>  |  0.73    Ca    8.2<L>      08 Oct 2018 10:47  Mg     2.5     10-08    TPro  8.0  /  Alb  2.9<L>  /  TBili  0.8  /  DBili  x   /  AST  13<L>  /  ALT  15  /  AlkPhos  81  10-08    CAPILLARY BLOOD GLUCOSE        PT/INR - ( 08 Oct 2018 10:47 )   PT: 14.4 sec;   INR: 1.31 ratio         PTT - ( 08 Oct 2018 10:47 )  PTT:27.7 sec    CARDIAC MARKERS ( 08 Oct 2018 10:47 )  <.015 ng/mL / x     / x     / x     / <1.0 ng/mL

## 2018-10-08 NOTE — H&P ADULT - HISTORY OF PRESENT ILLNESS
58 yo female w/schizophrenia, autism, h/o TB and untreated for a long time, R lung collapse, bronchiectasis, c/o 2 weeks of sob and cough, worse when laying down. Was admitted to ICU earlier this year on bipap for respiratory failure in setting of positive rvp. TB >10 years ago, no weight loss, night sweats, hemoptysis, denies any chest pain currently. In resp distress on eval with wet sounding breaths, improved with oxygen. 58 yo female w/schizophrenia, autism, h/o TB and untreated for a long time, R lung collapse, bronchiectasis, c/o 2 weeks of sob and cough, worse when laying down. Was admitted to ICU earlier this year on bipap for respiratory failure in setting of positive rvp. TB >10 years ago, no weight loss, night sweats, hemoptysis, denies any chest pain currently. In resp distress on eval with wet sounding breaths, improved with oxygen.  the fever began two days

## 2018-10-08 NOTE — H&P ADULT - ASSESSMENT
57f with schizophrenia and history of collapsed lung with short of breath     IMPROVE VTE Individual Risk Assessment          RISK                                                          Points    [  ] Previous VTE                                                3    [  ] Thrombophilia                                             2    [  ] Lower limb paralysis                                    2        (unable to hold up >15 seconds)      [  ] Current Cancer                                             2         (within 6 months)    [  ] Immobilization > 24 hrs                              1    [  ] ICU/CCU stay > 24 hours                            1    [  ] Age > 60                                                    1    IMPROVE VTE Score _____0____

## 2018-10-08 NOTE — ED PROVIDER NOTE - PROGRESS NOTE DETAILS
Yajaira: pt significnatly improved on bipap, switched to high flow and doing well. comfortably breathing. no symptoms to suggest tb and no cavitary lung lesion to suggest tb. evaluated by Dr. Mann from ICU, feels ok for floor. Dr. York aware.

## 2018-10-08 NOTE — CONSULT NOTE ADULT - ASSESSMENT
58 yo female w/schizophrenia, COPD, autism, h/o TB , R lung collapse, bronchiectasis, c/o 2 weeks of sob and cough

## 2018-10-08 NOTE — ED PROVIDER NOTE - OBJECTIVE STATEMENT
58yo female w/schizophrenia, autism, h/o TB and untreated for a long time, R lung collapse, bronchiectasis, c/o 2 weeks of sob and cough, worse when laying down. Was admitted to ICU earlier this year on bipap for respiratory failure in setting of positive rvp. TB >10 years ago, no weight loss, night sweats, hemoptysis, denies any chest pain currently. In resp distress on eval with wet sounding breaths, improved with oxygen.     limited ros by pt's condition/historian.

## 2018-10-08 NOTE — H&P ADULT - NSHPPHYSICALEXAM_GEN_ALL_CORE
GENERAL: NAD well-developed  HEAD:  Atraumatic, Normocephalic  EYES: EOMI, PERRLA, conjunctiva and sclera clear  ENMT: No tonsillar erythema, exudates, or enlargement; Moist mucous membranes, Good dentition, No lesions  NECK: Supple, No JVD, Normal thyroid  NERVOUS SYSTEM:  Alert & Oriented X3, Good concentration; Motor Strength 5/5 B/L upper and lower extremities; DTRs 2+ intact and symmetric  CHEST/LUNG: Clear to percussion bilaterally; No rales, rhonchi, wheezing, or rubs  HEART: Regular rate and rhythm; No murmurs, rubs, or gallops  ABDOMEN: Soft, Nontender, Nondistended; Bowel sounds present  EXTREMITIES:  2+ Peripheral Pulses, No clubbing, cyanosis, or edema  LYMPH: No lymphadenopathy   SKIN: No rashes or lesions GENERAL: NAD well-developed  HEAD:  Atraumatic, Normocephalic  EYES: EOMI, PERRLA, conjunctiva and sclera clear  ENMT: No tonsillar erythema, exudates, or enlargement; Moist mucous membranes, Good dentition, No lesions  NECK: Supple, No JVD, Normal thyroid  NERVOUS SYSTEM:  Alert & Oriented X3, Good concentration; Motor Strength 5/5 B/L upper and lower extremities; DTRs 2+ intact and symmetric  CHEST/LUNG: diffuse rhonch with l>r   HEART: Regular rate and rhythm; No murmurs, rubs, or gallops  ABDOMEN: Soft, Nontender, Nondistended; Bowel sounds present  EXTREMITIES:  2+ Peripheral Pulses, No clubbing, cyanosis, or edema  LYMPH: No lymphadenopathy   SKIN: No rashes or lesions

## 2018-10-09 DIAGNOSIS — G93.41 METABOLIC ENCEPHALOPATHY: ICD-10-CM

## 2018-10-09 DIAGNOSIS — J44.1 CHRONIC OBSTRUCTIVE PULMONARY DISEASE WITH (ACUTE) EXACERBATION: ICD-10-CM

## 2018-10-09 LAB
BASE EXCESS BLDA CALC-SCNC: 13 MMOL/L — HIGH (ref -2–2)
BLOOD GAS COMMENTS: SIGNIFICANT CHANGE UP
BLOOD GAS COMMENTS: SIGNIFICANT CHANGE UP
BLOOD GAS SOURCE: SIGNIFICANT CHANGE UP
HCO3 BLDA-SCNC: 42 MMOL/L — HIGH (ref 21–29)
HOROWITZ INDEX BLDA+IHG-RTO: 40 — SIGNIFICANT CHANGE UP
PCO2 BLDA: 83 MMHG — CRITICAL HIGH (ref 32–46)
PH BLD: 7.32 — LOW (ref 7.35–7.45)
PO2 BLDA: 92 MMHG — SIGNIFICANT CHANGE UP (ref 74–108)
SAO2 % BLDA: 95 % — SIGNIFICANT CHANGE UP (ref 92–96)

## 2018-10-09 PROCEDURE — 99291 CRITICAL CARE FIRST HOUR: CPT

## 2018-10-09 PROCEDURE — 99221 1ST HOSP IP/OBS SF/LOW 40: CPT

## 2018-10-09 PROCEDURE — 99233 SBSQ HOSP IP/OBS HIGH 50: CPT

## 2018-10-09 RX ORDER — PANTOPRAZOLE SODIUM 20 MG/1
40 TABLET, DELAYED RELEASE ORAL
Qty: 0 | Refills: 0 | Status: DISCONTINUED | OUTPATIENT
Start: 2018-10-09 | End: 2018-10-15

## 2018-10-09 RX ORDER — ARIPIPRAZOLE 15 MG/1
10 TABLET ORAL DAILY
Qty: 0 | Refills: 0 | Status: DISCONTINUED | OUTPATIENT
Start: 2018-10-09 | End: 2018-10-15

## 2018-10-09 RX ORDER — BUDESONIDE AND FORMOTEROL FUMARATE DIHYDRATE 160; 4.5 UG/1; UG/1
2 AEROSOL RESPIRATORY (INHALATION)
Qty: 0 | Refills: 0 | Status: DISCONTINUED | OUTPATIENT
Start: 2018-10-09 | End: 2018-10-15

## 2018-10-09 RX ORDER — BUDESONIDE, MICRONIZED 100 %
0.5 POWDER (GRAM) MISCELLANEOUS
Qty: 0 | Refills: 0 | Status: DISCONTINUED | OUTPATIENT
Start: 2018-10-09 | End: 2018-10-09

## 2018-10-09 RX ADMIN — PIPERACILLIN AND TAZOBACTAM 25 GRAM(S): 4; .5 INJECTION, POWDER, LYOPHILIZED, FOR SOLUTION INTRAVENOUS at 12:21

## 2018-10-09 RX ADMIN — PIPERACILLIN AND TAZOBACTAM 25 GRAM(S): 4; .5 INJECTION, POWDER, LYOPHILIZED, FOR SOLUTION INTRAVENOUS at 21:54

## 2018-10-09 RX ADMIN — Medication 40 MILLIGRAM(S): at 05:06

## 2018-10-09 RX ADMIN — Medication 3 MILLILITER(S): at 11:14

## 2018-10-09 RX ADMIN — Medication 81 MILLIGRAM(S): at 12:21

## 2018-10-09 RX ADMIN — Medication 2 MILLIGRAM(S): at 17:59

## 2018-10-09 RX ADMIN — Medication 3 MILLILITER(S): at 17:15

## 2018-10-09 RX ADMIN — PANTOPRAZOLE SODIUM 40 MILLIGRAM(S): 20 TABLET, DELAYED RELEASE ORAL at 18:05

## 2018-10-09 RX ADMIN — OLANZAPINE 10 MILLIGRAM(S): 15 TABLET, FILM COATED ORAL at 05:07

## 2018-10-09 RX ADMIN — PIPERACILLIN AND TAZOBACTAM 25 GRAM(S): 4; .5 INJECTION, POWDER, LYOPHILIZED, FOR SOLUTION INTRAVENOUS at 05:06

## 2018-10-09 RX ADMIN — SERTRALINE 100 MILLIGRAM(S): 25 TABLET, FILM COATED ORAL at 12:21

## 2018-10-09 RX ADMIN — Medication 40 MILLIGRAM(S): at 17:59

## 2018-10-09 RX ADMIN — DIVALPROEX SODIUM 500 MILLIGRAM(S): 500 TABLET, DELAYED RELEASE ORAL at 05:06

## 2018-10-09 RX ADMIN — Medication 2 MILLIGRAM(S): at 05:07

## 2018-10-09 RX ADMIN — ARIPIPRAZOLE 10 MILLIGRAM(S): 15 TABLET ORAL at 15:00

## 2018-10-09 RX ADMIN — Medication 3 MILLILITER(S): at 05:32

## 2018-10-09 RX ADMIN — DIVALPROEX SODIUM 500 MILLIGRAM(S): 500 TABLET, DELAYED RELEASE ORAL at 17:59

## 2018-10-09 RX ADMIN — OLANZAPINE 10 MILLIGRAM(S): 15 TABLET, FILM COATED ORAL at 17:59

## 2018-10-09 RX ADMIN — Medication 3 MILLILITER(S): at 00:29

## 2018-10-09 NOTE — CONSULT NOTE ADULT - ATTENDING COMMENTS
pt with structural lung disease on home oxygen admitted with fever and resp failure   cont high flow oxygen and zosyn   check procalcitonin in am   viral RVP +   h/o TB ? treatment completion   no TB symptoms now
Pt is a 58 yo F with h/o asthma vs COPD (pt's brother says she never smoked), TB with resultant chronic volume loss of the R lung with cystic bronchiectasis, chronic CO2 retainer, autism and schizophrenia presents 2 to 2 weeks of sob and cough. In the ER pt febrile and hypoxic; placed on BiPAP with improvement in oxygenation. Pt given trial of Venti mask but desat'd 88-91 and then placed on nc HFO2; breathing comfortably and feels better. RVP(+) Entero/Rhinovirus. Pt decompensated by viral URI +/- bacterial component. May manage on GMF with Nebs + empiric Abx and nc HFO2.    CCT: 35 min

## 2018-10-09 NOTE — CONSULT NOTE ADULT - SUBJECTIVE AND OBJECTIVE BOX
Patient is a 57y old  Female who presents with a chief complaint of cough./fever (09 Oct 2018 16:55)  HPI:  58 yo female w/schizophrenia, autism, h/o TB and untreated for a long time, R lung collapse, bronchiectasis, c/o 2 weeks of sob and cough, worse when laying down. Was admitted to ICU earlier this year on bipap for respiratory failure in setting of positive rvp. TB >10 years ago, no weight loss, night sweats, hemoptysis, denies any chest pain currently. In resp distress on eval with wet sounding breaths, improved with oxygen.  the fever began two days (08 Oct 2018 16:33). patient was placed on BIPAP intially in ER and then was changed to High flow. patient now on high flow  feeling comfortable. patient is on oxygen saturation home during day abd BIPAP at night  PAST MEDICAL & SURGICAL HISTORY:  Schizophrenia  Tuberculosis  Lung disease  Autism  Hypoxia  No significant past surgical history  FAMILY HISTORY:  No pertinent family history in first degree relatives  Allergies    No Known Allergies    Intolerances    MEDICATIONS  (STANDING):  ALBUTerol    90 MICROgram(s) HFA Inhaler 1 Puff(s) Inhalation every 4 hours  ALBUTerol/ipratropium for Nebulization 3 milliLiter(s) Nebulizer every 6 hours  ARIPiprazole 10 milliGRAM(s) Oral daily  aspirin enteric coated 81 milliGRAM(s) Oral daily  benztropine 2 milliGRAM(s) Oral two times a day  buDESOnide   0.5 milliGRAM(s) Respule 0.5 milliGRAM(s) Inhalation two times a day  diVALproex  milliGRAM(s) Oral two times a day  methylPREDNISolone sodium succinate Injectable 40 milliGRAM(s) IV Push every 12 hours  OLANZapine 10 milliGRAM(s) Oral two times a day  pantoprazole    Tablet 40 milliGRAM(s) Oral before breakfast  piperacillin/tazobactam IVPB. 3.375 Gram(s) IV Intermittent every 8 hours  sertraline 100 milliGRAM(s) Oral daily  tiotropium 18 MICROgram(s) Capsule 1 Capsule(s) Inhalation daily    MEDICATIONS  (PRN):  acetaminophen   Tablet .. 325 milliGRAM(s) Oral every 4 hours PRN Temp greater or equal to 38C (100.4F), Mild Pain (1 - 3)    ROS  constitutional - denies any fever, weight loss or weight gain , normal appetite   pulmonary- c/o cough , productive , shortness of breath   cardiovascular- denies any chest pain  GI- denies any nausea , vomiting or diarrhea  Neuro- denies any dizziness or weakness  Vital Signs Last 24 Hrs  T(C): 36.6 (09 Oct 2018 16:50), Max: 36.6 (09 Oct 2018 16:50)  T(F): 97.9 (09 Oct 2018 16:50), Max: 97.9 (09 Oct 2018 16:50)  HR: 83 (09 Oct 2018 17:19) (63 - 84)  BP: 105/63 (09 Oct 2018 16:50) (105/63 - 111/63)  BP(mean): --  RR: 20 (09 Oct 2018 16:50) (20 - 22)  SpO2: 95% (09 Oct 2018 17:19) (95% - 98%)  Physical Exam  patient lying in bed in no respiratory distress  HEENT - EOMI, PERRLA ,neck supple , No JVD  lungs - decreased BS, + wheezing and  ronchi   COR- [id number] , no murmer  Abd- soft, BS+, no tenderness, no guarding   ext- ecc neg, good pulses  Neuro - Alert , oriented X3   Skin- No rashes     ABG - ( 09 Oct 2018 20:21 )  pH, Arterial: x     pH, Blood: 7.32  /  pCO2: 83    /  pO2: 92    / HCO3: 42    / Base Excess: 13.0  /  SaO2: 95              Urinalysis Basic - ( 08 Oct 2018 22:29 )    Color: Yellow / Appearance: Clear / S.015 / pH: x  Gluc: x / Ketone: Negative  / Bili: Negative / Urobili: Negative mg/dL   Blood: x / Protein: 30 mg/dL / Nitrite: Negative   Leuk Esterase: Trace / RBC: 3-5 /HPF / WBC 0-2   Sq Epi: x / Non Sq Epi: Occasional / Bacteria: Few    PT/INR - ( 08 Oct 2018 10:47 )   PT: 14.4 sec;   INR: 1.31 ratio         PTT - ( 08 Oct 2018 10:47 )  PTT:27.7 secCARDIAC MARKERS ( 08 Oct 2018 10:47 )  <.015 ng/mL / x     / x     / x     / <1.0 ng/mL                          10.5   10.00 )-----------( 195      ( 08 Oct 2018 10:47 )             35.4   10-08    139  |  96  |  13  ----------------------------<  117<H>  4.0   |  38<H>  |  0.73    Ca    8.2<L>      08 Oct 2018 10:47  Mg     2.5     10-08    TPro  8.0  /  Alb  2.9<L>  /  TBili  0.8  /  DBili  x   /  AST  13<L>  /  ALT  15  /  AlkPhos  81  10-08          RVP:  10-08 @ 15:12  229E Coronavirus: --           Adenovirus: --           Bordetella Pertussis --           Chlamydia Pneumoniae --           Entero/Rhinovirus Detected     HKU1 Coronavirus --           hMPV --           Influenza A --           Influenza AH1 --           Influenza AH1  --           Influenza AH3 --           Influenza B --           Mycoplasma pneumoniae --           NL63 Coronavirus --           OC43 Coronavirus --           Parainfluenza 1 --           Parainfluenza 2 --           Parainfluenza 3 --           Parainfluenza 4 --           Resp Syncytial Virus --             CXR - loss of volume chronic , no acute infiltrates

## 2018-10-09 NOTE — CONSULT NOTE ADULT - ASSESSMENT
acute on chronic respiratory failure   ? Pneumonia     Recommendations   continue high flow   BIPAP at night   bronchodilators   on Solumedrol   continue Antibiotics as per ID   procalcitonin  Thank you for this consult  will f/u the patient with you

## 2018-10-09 NOTE — CONSULT NOTE ADULT - ASSESSMENT
Amber Delaney M [facility]  [phone]  ALLERGY nka   CONTACT Freddie Clark [phone]  REASON FOR VISIT   Initial evaluation/Pulmonary consultation  requested 10/9/2018 by Dr Zayda Perkins   from Dr Irvin   Patient examined chart reviewed  HOSPITAL ADMISSION [facility]    PATIENT CAME  FROM (if information available)      VITALS/LABS                           10/9/2018 afeb 70 105/63   10/9/2018 W 10 Hb 10.5 Plt 195  Na 139 K 4 CO2 38 Cr .7  10/9/2018 Tr 1 n     10/9/2018 11a 100% 737/67/185   10/9/2018 ua 1015 R 3-5 W 0-2   10/9/2018 Enterorhino detected   10/9/2018 CXr Chr postinflamm r hemthrx changes are stable     REVIEW OF SYMPTOMS    Able to give ROS  NO     PHYSICAL EXAM    HEENT Unremarkable PERRLA atraumatic   RESP Fair air entry EXP prolonged    Harsh breath sound Resp distres mild   CARDIAC S1 S2 No S3     NO JVD    ABDOMEN SOFT BS PRESENT NOT DISTENDED No hepatosplenomegaly PEDAL EDEMA present No calf tenderness  NO rash   GENERAL Not TOXIC looking    GLOBAL ISSUE/BEST PRACTICE:      PROBLEM: HOB elevation:   y            PROBLEM: Stress ulcer proph:    protonix 40 (10/9)                       PROBLEM: VTE prophylaxis:      NA  PROBLEM: Glycemic control:    na  PROBLEM: Nutrition:    Mech soft (10/9)   PROBLEM: Advanced directive: na     PROBLEM: Allergies:  na    PROBLEM/ASSESSMENT/PLAN    PNEUMONIA   10/9/2018 Enterorhino detected   10/9/2018 CXr Chr postinflamm r hemthrx changes are stable   Zosyn (10/8)   ASSESSMENT/RECOMMENDATIONS  10/9/2018 Check pct serologies Cont Zosyn Check sp c     PROBLEM/ASSESSMENT/PLAN    PAST HO TB WITH FIBROTIC CHANGES    ASSESSMENT/RECOMMENDATIONS  10/9/2018  Will need to ro active TB after current illness is resolved and then consider INH   If she deteriorates during current admission then will reevaluate plan    PROBLEM/ASSESSMENT/PLAN    COPD EX   Solumed 40.2 (10/9)   Duoneb.4 (10/8)   Spiriva (10/8)   ASSESSMENT/RECOMMENDATIONS    Cont Rx     PROBLEM/ASSESSMENT/PLAN    ACUTE RESP FAILURE   10/9/2018 11a 100% 737/67/185   BPAP 18/8/.5 (10/9/2018 7p)   ASSESSMENT/RECOMMENDATIONS     10/9/2018 Monitor ABG vbg po       PROBLEM/ASSESSMENT/PLAN    CAD  ASA 81 (10/8)   ASSESSMENT/RECOMMENDATIONS  Cont Rx    PROBLEM/ASSESSMENT/PLAN    DEPRESSION  Aripiprazole 10 (10/9)   Benztropine 2.2 (10/8)   Depakote 500.2 (10/8)   Olanzapine 10.2 (10/8)   Sertraline 100 (10/8)   ASSESSMENT/RECOMMENDATIONS   Cont Rx        TIME SPENT Over 65 minutes aggregate care time spent on encounter; activities included   direct patient care, counseling and/or coordinating care reviewing notes, lab data/ imaging , discussion with multidisciplinary team/ patient  /family. Risks, benefits, alternatives  discussed in detail.

## 2018-10-09 NOTE — PROGRESS NOTE ADULT - PROBLEM SELECTOR PLAN 4
start inhalers on discharge   trilogy machine at night continue with aripiprazole, divalproex, benztropine could be due to current infection and psych-meds

## 2018-10-09 NOTE — PROGRESS NOTE ADULT - ASSESSMENT
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 Admitted with acute on chronic hypercapnic respiratory failure  PNA and elevated troponin. RVP positive for RSV.

## 2018-10-09 NOTE — CONSULT NOTE ADULT - SUBJECTIVE AND OBJECTIVE BOX
Infectious Diseases - Attending at Dr. Kenney    HPI:  58 yo female w/schizophrenia, autism, h/o TB and untreated for a long time, R lung collapse, bronchiectasis, c/o 2 weeks of sob and cough, worse when laying down. Was admitted to ICU earlier this year on bipap for respiratory failure in setting of positive rvp. TB >10 years ago, no weight loss, night sweats, hemoptysis, denies any chest pain currently. In resp distress on eval with wet sounding breaths, improved with oxygen.  the fever began two days (08 Oct 2018 16:33)      PAST MEDICAL & SURGICAL HISTORY:  Schizophrenia  Tuberculosis  Lung disease  Autism  Hypoxia  No significant past surgical history      Allergies    No Known Allergies    Intolerances        FAMILY HISTORY:  No pertinent family history in first degree relatives      SOCIAL HISTORY:    REVIEW OF SYSTEMS:    Constitutional: No fever, weight loss or fatigue  Eyes: No eye pain, visual disturbances, or discharge  ENT:  No difficulty hearing, tinnitus, vertigo; No sinus or throat pain  Neck: No pain or stiffness  Respiratory: No cough, wheezing, chills or hemoptysis  Cardiovascular: No chest pain, palpitations, shortness of breath, dizziness or leg swelling  Gastrointestinal: No abdominal or epigastric pain. No nausea, vomiting or hematemesis; No diarrhea or constipation. No melena or hematochezia.  Genitourinary: No dysuria, frequency, hematuria or incontinence  Neurological: No headaches, memory loss, loss of strength, numbness or tremors  Skin: No itching, burning, rashes or lesions       MEDICATIONS  (STANDING):  ALBUTerol    90 MICROgram(s) HFA Inhaler 1 Puff(s) Inhalation every 4 hours  ALBUTerol/ipratropium for Nebulization 3 milliLiter(s) Nebulizer every 6 hours  ARIPiprazole 10 milliGRAM(s) Oral daily  aspirin enteric coated 81 milliGRAM(s) Oral daily  benztropine 2 milliGRAM(s) Oral two times a day  diVALproex  milliGRAM(s) Oral two times a day  methylPREDNISolone sodium succinate Injectable 40 milliGRAM(s) IV Push every 12 hours  OLANZapine 10 milliGRAM(s) Oral two times a day  pantoprazole    Tablet 40 milliGRAM(s) Oral before breakfast  piperacillin/tazobactam IVPB. 3.375 Gram(s) IV Intermittent every 8 hours  sertraline 100 milliGRAM(s) Oral daily  tiotropium 18 MICROgram(s) Capsule 1 Capsule(s) Inhalation daily    MEDICATIONS  (PRN):  acetaminophen   Tablet .. 325 milliGRAM(s) Oral every 4 hours PRN Temp greater or equal to 38C (100.4F), Mild Pain (1 - 3)      Vital Signs Last 24 Hrs  T(C): 35.9 (09 Oct 2018 05:13), Max: 37.2 (08 Oct 2018 18:30)  T(F): 96.6 (09 Oct 2018 05:13), Max: 99 (08 Oct 2018 18:30)  HR: 80 (09 Oct 2018 11:30) (58 - 83)  BP: 111/63 (09 Oct 2018 05:13) (107/52 - 111/63)  BP(mean): --  RR: 22 (09 Oct 2018 09:11) (20 - 22)  SpO2: 95% (09 Oct 2018 11:30) (95% - 98%)    PHYSICAL EXAM:    Constitutional: NAD, well-groomed, well-developed  HEENT: PERRLA, EOMI, Normal Hearing, MMM  Neck: No LAD, No JVD  Back: Normal spine flexure, No CVA tenderness  Respiratory: CTAB/L  Cardiovascular: S1 and S2, RRR, no M/G/R  Gastrointestinal: BS+, soft, NT/ND  Extremities: No peripheral edema  Vascular: 2+ peripheral pulses  Neurological: A/O x 3, no focal deficits  Skin: No rashes      LABS:                        10.5   10.00 )-----------( 195      ( 08 Oct 2018 10:47 )             35.4     10-    139  |  96  |  13  ----------------------------<  117<H>  4.0   |  38<H>  |  0.73    Ca    8.2<L>      08 Oct 2018 10:47  Mg     2.5     10    TPro  8.0  /  Alb  2.9<L>  /  TBili  0.8  /  DBili  x   /  AST  13<L>  /  ALT  15  /  AlkPhos  81  10-08    PT/INR - ( 08 Oct 2018 10:47 )   PT: 14.4 sec;   INR: 1.31 ratio         PTT - ( 08 Oct 2018 10:47 )  PTT:27.7 sec  Urinalysis Basic - ( 08 Oct 2018 22:29 )    Color: Yellow / Appearance: Clear / S.015 / pH: x  Gluc: x / Ketone: Negative  / Bili: Negative / Urobili: Negative mg/dL   Blood: x / Protein: 30 mg/dL / Nitrite: Negative   Leuk Esterase: Trace / RBC: 3-5 /HPF / WBC 0-2   Sq Epi: x / Non Sq Epi: Occasional / Bacteria: Few        MICROBIOLOGY:  RECENT CULTURES:  10-08 .Blood Blood-Peripheral XXXX XXXX   No growth to date.          RADIOLOGY & ADDITIONAL STUDIES:

## 2018-10-09 NOTE — PROGRESS NOTE ADULT - PROBLEM SELECTOR PLAN 2
likely gram neg pneumonia, ID eval  cont on zosyn, follow up bcx/ucx hx of Bronchiectasis without complication, Collapse of right lung   likely gram neg pneumonia, ID eval  cont on zosyn, follow up bcx/ucx

## 2018-10-09 NOTE — PROGRESS NOTE ADULT - PROBLEM SELECTOR PROBLEM 4
Chronic obstructive pulmonary disease, unspecified COPD type Schizophrenia, unspecified type Acute metabolic encephalopathy

## 2018-10-09 NOTE — PROGRESS NOTE ADULT - PROBLEM SELECTOR PLAN 3
continue with aripiprazole, divalproex, benztropine ALBUTerol/ipratropium for Nebulization 3 milliLiter(s) Nebulizer every 6 hours  solumedrol, add ppi  ID/pulm eval

## 2018-10-09 NOTE — PROGRESS NOTE ADULT - SUBJECTIVE AND OBJECTIVE BOX
Patient is a 57y old  Female who presents with a chief complaint of respiratory distress (08 Oct 2018 17:42)      OVERNIGHT EVENTS: fever/chillsa, cough    REVIEW OF SYSTEMS: delirious denies chest pain/SOB, diaphoresis, no F/C, cough, dizziness, headache, blurry vision, nausea, vomiting, abdominal pain. All others review of systems negative     MEDICATIONS  (STANDING):  ALBUTerol    90 MICROgram(s) HFA Inhaler 1 Puff(s) Inhalation every 4 hours  ALBUTerol/ipratropium for Nebulization 3 milliLiter(s) Nebulizer every 6 hours  ARIPiprazole 10 milliGRAM(s) Oral daily  aspirin enteric coated 81 milliGRAM(s) Oral daily  benztropine 2 milliGRAM(s) Oral two times a day  diVALproex  milliGRAM(s) Oral two times a day  methylPREDNISolone sodium succinate Injectable 40 milliGRAM(s) IV Push every 12 hours  OLANZapine 10 milliGRAM(s) Oral two times a day  piperacillin/tazobactam IVPB. 3.375 Gram(s) IV Intermittent every 8 hours  sertraline 100 milliGRAM(s) Oral daily  tiotropium 18 MICROgram(s) Capsule 1 Capsule(s) Inhalation daily    MEDICATIONS  (PRN):  acetaminophen   Tablet .. 325 milliGRAM(s) Oral every 4 hours PRN Temp greater or equal to 38C (100.4F), Mild Pain (1 - 3)      Allergies    No Known Allergies    Intolerances        T(F): 96.6 (10-09-18 @ 05:13), Max: 99 (10-08-18 @ 16:07)  HR: 80 (10-09-18 @ 11:30) (58 - 83)  BP: 111/63 (10-09-18 @ 05:13) (107/52 - 123/67)  RR: 22 (10-09-18 @ 09:11) (19 - 22)  SpO2: 95% (10-09-18 @ 11:30) (95% - 98%)  Wt(kg): --    PHYSICAL EXAM:  GENERAL: delirious, toxic looking, well-groomed, well-developed  HEAD:  Atraumatic, Normocephalic  EYES: EOMI, PERRLA, conjunctiva and sclera clear  ENMT: No tonsillar erythema, exudates, or enlargement; Moist mucous membranes, Good dentition, No lesions  NECK: Supple, No JVD, Normal thyroid  NERVOUS SYSTEM:  Alert & Oriented X2, Motor Strength 4/5 B/L upper and lower extremities; DTRs 2+ intact and symmetric  CHEST/LUNG: decreased bilaterally; wheezing BL  HEART: Regular rate and rhythm; No murmurs, rubs, or gallops  ABDOMEN: Soft, Nontender, Nondistended; Bowel sounds present  EXTREMITIES:  2+ Peripheral Pulses, No clubbing, cyanosis, or edema BL LE  LYMPH: No lymphadenopathy noted  SKIN: No rashes or lesions    LABS:                        10.5   10.00 )-----------( 195      ( 08 Oct 2018 10:47 )             35.4     10    139  |  96  |  13  ----------------------------<  117<H>  4.0   |  38<H>  |  0.73    Ca    8.2<L>      08 Oct 2018 10:47  Mg     2.5     10    TPro  8.0  /  Alb  2.9<L>  /  TBili  0.8  /  DBili  x   /  AST  13<L>  /  ALT  15  /  AlkPhos  81  10-    PT/INR - ( 08 Oct 2018 10:47 )   PT: 14.4 sec;   INR: 1.31 ratio         PTT - ( 08 Oct 2018 10:47 )  PTT:27.7 sec  Urinalysis Basic - ( 08 Oct 2018 22:29 )    Color: Yellow / Appearance: Clear / S.015 / pH: x  Gluc: x / Ketone: Negative  / Bili: Negative / Urobili: Negative mg/dL   Blood: x / Protein: 30 mg/dL / Nitrite: Negative   Leuk Esterase: Trace / RBC: 3-5 /HPF / WBC 0-2   Sq Epi: x / Non Sq Epi: Occasional / Bacteria: Few      Cultures;   CAPILLARY BLOOD GLUCOSE        Lipid panel:     CARDIAC MARKERS ( 08 Oct 2018 10:47 )  <.015 ng/mL / x     / x     / x     / <1.0 ng/mL        RADIOLOGY & ADDITIONAL TESTS:    Imaging Personally Reviewed:  [x ] YES    < from: Xray Chest 1 View- PORTABLE-Urgent (10.08.18 @ 11:12) >  Chronic post-inflammatory right hemithorax changes - are stable.  Left lung is clear.    < end of copied text >      Consultant(s) Notes Reviewed:  [x ] YES     Care Discussed with [ x] Consultants [X ] Patient [x ] Family; brother Dr. Clark [phone]  [x ]    [x ]  Other; RN Patient is a 57y old  Female who presents with a chief complaint of respiratory distress (08 Oct 2018 17:42)      OVERNIGHT EVENTS: fever/chills, cough, SOB, RAMIREZ    REVIEW OF SYSTEMS: delirious denies chest pain, diaphoresis, dizziness, headache, blurry vision, nausea, vomiting, abdominal pain. All others review of systems negative     MEDICATIONS  (STANDING):  ALBUTerol    90 MICROgram(s) HFA Inhaler 1 Puff(s) Inhalation every 4 hours  ALBUTerol/ipratropium for Nebulization 3 milliLiter(s) Nebulizer every 6 hours  ARIPiprazole 10 milliGRAM(s) Oral daily  aspirin enteric coated 81 milliGRAM(s) Oral daily  benztropine 2 milliGRAM(s) Oral two times a day  diVALproex  milliGRAM(s) Oral two times a day  methylPREDNISolone sodium succinate Injectable 40 milliGRAM(s) IV Push every 12 hours  OLANZapine 10 milliGRAM(s) Oral two times a day  piperacillin/tazobactam IVPB. 3.375 Gram(s) IV Intermittent every 8 hours  sertraline 100 milliGRAM(s) Oral daily  tiotropium 18 MICROgram(s) Capsule 1 Capsule(s) Inhalation daily    MEDICATIONS  (PRN):  acetaminophen   Tablet .. 325 milliGRAM(s) Oral every 4 hours PRN Temp greater or equal to 38C (100.4F), Mild Pain (1 - 3)      Allergies    No Known Allergies    Intolerances        T(F): 96.6 (10-09-18 @ 05:13), Max: 99 (10-08-18 @ 16:07)  HR: 80 (10-09-18 @ 11:30) (58 - 83)  BP: 111/63 (10-09-18 @ 05:13) (107/52 - 123/67)  RR: 22 (10-09-18 @ 09:11) (19 - 22)  SpO2: 95% (10-09-18 @ 11:30) (95% - 98%)  Wt(kg): --    PHYSICAL EXAM:  GENERAL: delirious, toxic looking, well-groomed, well-developed  HEAD:  Atraumatic, Normocephalic  EYES: EOMI, PERRLA, conjunctiva and sclera clear  ENMT: No tonsillar erythema, exudates, or enlargement; Moist mucous membranes, Good dentition, No lesions  NECK: Supple, No JVD, Normal thyroid  NERVOUS SYSTEM:  Alert & Oriented X2, Motor Strength 4/5 B/L upper and lower extremities; DTRs 2+ intact and symmetric  CHEST/LUNG: decreased bilaterally; wheezing BL  HEART: Regular rate and rhythm; No murmurs, rubs, or gallops  ABDOMEN: Soft, Nontender, Nondistended; Bowel sounds present  EXTREMITIES:  2+ Peripheral Pulses, No clubbing, cyanosis, or edema BL LE  LYMPH: No lymphadenopathy noted  SKIN: No rashes or lesions    LABS:                        10.5   10.00 )-----------( 195      ( 08 Oct 2018 10:47 )             35.4     10    139  |  96  |  13  ----------------------------<  117<H>  4.0   |  38<H>  |  0.73    Ca    8.2<L>      08 Oct 2018 10:47  Mg     2.5     10-    TPro  8.0  /  Alb  2.9<L>  /  TBili  0.8  /  DBili  x   /  AST  13<L>  /  ALT  15  /  AlkPhos  81  10-    PT/INR - ( 08 Oct 2018 10:47 )   PT: 14.4 sec;   INR: 1.31 ratio         PTT - ( 08 Oct 2018 10:47 )  PTT:27.7 sec  Urinalysis Basic - ( 08 Oct 2018 22:29 )    Color: Yellow / Appearance: Clear / S.015 / pH: x  Gluc: x / Ketone: Negative  / Bili: Negative / Urobili: Negative mg/dL   Blood: x / Protein: 30 mg/dL / Nitrite: Negative   Leuk Esterase: Trace / RBC: 3-5 /HPF / WBC 0-2   Sq Epi: x / Non Sq Epi: Occasional / Bacteria: Few      Cultures;   CAPILLARY BLOOD GLUCOSE        Lipid panel:     CARDIAC MARKERS ( 08 Oct 2018 10:47 )  <.015 ng/mL / x     / x     / x     / <1.0 ng/mL        RADIOLOGY & ADDITIONAL TESTS:    Imaging Personally Reviewed:  [x ] YES    < from: Xray Chest 1 View- PORTABLE-Urgent (10.08.18 @ 11:12) >  Chronic post-inflammatory right hemithorax changes - are stable.  Left lung is clear.    < end of copied text >      Consultant(s) Notes Reviewed:  [x ] YES     Care Discussed with [ x] Consultants [X ] Patient [x ] Family; brother Dr. Clark [phone]  [x ]    [x ]  Other; RN

## 2018-10-10 LAB
ALBUMIN SERPL ELPH-MCNC: 2.6 G/DL — LOW (ref 3.3–5)
ALP SERPL-CCNC: 83 U/L — SIGNIFICANT CHANGE UP (ref 40–120)
ALT FLD-CCNC: 36 U/L — SIGNIFICANT CHANGE UP (ref 12–78)
ANION GAP SERPL CALC-SCNC: 2 MMOL/L — LOW (ref 5–17)
AST SERPL-CCNC: 25 U/L — SIGNIFICANT CHANGE UP (ref 15–37)
BASE EXCESS BLDA CALC-SCNC: 9.4 MMOL/L — HIGH (ref -2–2)
BILIRUB SERPL-MCNC: 0.4 MG/DL — SIGNIFICANT CHANGE UP (ref 0.2–1.2)
BLOOD GAS COMMENTS: SIGNIFICANT CHANGE UP
BLOOD GAS COMMENTS: SIGNIFICANT CHANGE UP
BLOOD GAS SOURCE: SIGNIFICANT CHANGE UP
BUN SERPL-MCNC: 19 MG/DL — SIGNIFICANT CHANGE UP (ref 7–23)
CALCIUM SERPL-MCNC: 8.7 MG/DL — SIGNIFICANT CHANGE UP (ref 8.5–10.1)
CHLORIDE SERPL-SCNC: 95 MMOL/L — LOW (ref 96–108)
CO2 SERPL-SCNC: 43 MMOL/L — HIGH (ref 22–31)
CREAT SERPL-MCNC: 0.7 MG/DL — SIGNIFICANT CHANGE UP (ref 0.5–1.3)
CULTURE RESULTS: NO GROWTH — SIGNIFICANT CHANGE UP
GLUCOSE SERPL-MCNC: 120 MG/DL — HIGH (ref 70–99)
HCO3 BLDA-SCNC: 37 MMOL/L — HIGH (ref 21–29)
HCT VFR BLD CALC: 38.2 % — SIGNIFICANT CHANGE UP (ref 34.5–45)
HGB BLD-MCNC: 10.7 G/DL — LOW (ref 11.5–15.5)
HOROWITZ INDEX BLDA+IHG-RTO: 0.4 — SIGNIFICANT CHANGE UP
INR BLD: 1.18 RATIO — HIGH (ref 0.88–1.16)
LEGIONELLA AG UR QL: NEGATIVE — SIGNIFICANT CHANGE UP
MAGNESIUM SERPL-MCNC: 2.3 MG/DL — SIGNIFICANT CHANGE UP (ref 1.6–2.6)
MCHC RBC-ENTMCNC: 27 PG — SIGNIFICANT CHANGE UP (ref 27–34)
MCHC RBC-ENTMCNC: 28 GM/DL — LOW (ref 32–36)
MCV RBC AUTO: 96.5 FL — SIGNIFICANT CHANGE UP (ref 80–100)
NRBC # BLD: 0 /100 WBCS — SIGNIFICANT CHANGE UP (ref 0–0)
PCO2 BLDA: 70 MMHG — CRITICAL HIGH (ref 32–46)
PH BLD: 7.34 — LOW (ref 7.35–7.45)
PHOSPHATE SERPL-MCNC: 3.2 MG/DL — SIGNIFICANT CHANGE UP (ref 2.5–4.5)
PLATELET # BLD AUTO: 220 K/UL — SIGNIFICANT CHANGE UP (ref 150–400)
PO2 BLDA: 120 MMHG — HIGH (ref 74–108)
POTASSIUM SERPL-MCNC: 4.9 MMOL/L — SIGNIFICANT CHANGE UP (ref 3.5–5.3)
POTASSIUM SERPL-SCNC: 4.9 MMOL/L — SIGNIFICANT CHANGE UP (ref 3.5–5.3)
PROCALCITONIN SERPL-MCNC: 0.03 NG/ML — SIGNIFICANT CHANGE UP (ref 0.02–0.1)
PROT SERPL-MCNC: 8 GM/DL — SIGNIFICANT CHANGE UP (ref 6–8.3)
PROTHROM AB SERPL-ACNC: 12.9 SEC — HIGH (ref 9.8–12.7)
RBC # BLD: 3.96 M/UL — SIGNIFICANT CHANGE UP (ref 3.8–5.2)
RBC # FLD: 15.9 % — HIGH (ref 10.3–14.5)
SAO2 % BLDA: 97 % — HIGH (ref 92–96)
SODIUM SERPL-SCNC: 140 MMOL/L — SIGNIFICANT CHANGE UP (ref 135–145)
SPECIMEN SOURCE: SIGNIFICANT CHANGE UP
WBC # BLD: 13.97 K/UL — HIGH (ref 3.8–10.5)
WBC # FLD AUTO: 13.97 K/UL — HIGH (ref 3.8–10.5)

## 2018-10-10 PROCEDURE — 99232 SBSQ HOSP IP/OBS MODERATE 35: CPT

## 2018-10-10 PROCEDURE — 99233 SBSQ HOSP IP/OBS HIGH 50: CPT

## 2018-10-10 RX ORDER — SODIUM CHLORIDE 9 MG/ML
250 INJECTION INTRAMUSCULAR; INTRAVENOUS; SUBCUTANEOUS ONCE
Qty: 0 | Refills: 0 | Status: COMPLETED | OUTPATIENT
Start: 2018-10-10 | End: 2018-10-10

## 2018-10-10 RX ADMIN — Medication 3 MILLILITER(S): at 11:56

## 2018-10-10 RX ADMIN — Medication 2 MILLIGRAM(S): at 05:54

## 2018-10-10 RX ADMIN — Medication 81 MILLIGRAM(S): at 11:34

## 2018-10-10 RX ADMIN — Medication 40 MILLIGRAM(S): at 05:53

## 2018-10-10 RX ADMIN — BUDESONIDE AND FORMOTEROL FUMARATE DIHYDRATE 2 PUFF(S): 160; 4.5 AEROSOL RESPIRATORY (INHALATION) at 05:54

## 2018-10-10 RX ADMIN — Medication 3 MILLILITER(S): at 05:51

## 2018-10-10 RX ADMIN — PIPERACILLIN AND TAZOBACTAM 25 GRAM(S): 4; .5 INJECTION, POWDER, LYOPHILIZED, FOR SOLUTION INTRAVENOUS at 21:27

## 2018-10-10 RX ADMIN — SODIUM CHLORIDE 500 MILLILITER(S): 9 INJECTION INTRAMUSCULAR; INTRAVENOUS; SUBCUTANEOUS at 17:06

## 2018-10-10 RX ADMIN — BUDESONIDE AND FORMOTEROL FUMARATE DIHYDRATE 2 PUFF(S): 160; 4.5 AEROSOL RESPIRATORY (INHALATION) at 17:05

## 2018-10-10 RX ADMIN — Medication 40 MILLIGRAM(S): at 17:06

## 2018-10-10 RX ADMIN — DIVALPROEX SODIUM 500 MILLIGRAM(S): 500 TABLET, DELAYED RELEASE ORAL at 17:07

## 2018-10-10 RX ADMIN — Medication 3 MILLILITER(S): at 00:26

## 2018-10-10 RX ADMIN — ARIPIPRAZOLE 10 MILLIGRAM(S): 15 TABLET ORAL at 11:34

## 2018-10-10 RX ADMIN — DIVALPROEX SODIUM 500 MILLIGRAM(S): 500 TABLET, DELAYED RELEASE ORAL at 05:54

## 2018-10-10 RX ADMIN — OLANZAPINE 10 MILLIGRAM(S): 15 TABLET, FILM COATED ORAL at 17:06

## 2018-10-10 RX ADMIN — PIPERACILLIN AND TAZOBACTAM 25 GRAM(S): 4; .5 INJECTION, POWDER, LYOPHILIZED, FOR SOLUTION INTRAVENOUS at 05:54

## 2018-10-10 RX ADMIN — Medication 3 MILLILITER(S): at 17:18

## 2018-10-10 RX ADMIN — SERTRALINE 100 MILLIGRAM(S): 25 TABLET, FILM COATED ORAL at 11:34

## 2018-10-10 RX ADMIN — PANTOPRAZOLE SODIUM 40 MILLIGRAM(S): 20 TABLET, DELAYED RELEASE ORAL at 05:54

## 2018-10-10 RX ADMIN — Medication 2 MILLIGRAM(S): at 17:06

## 2018-10-10 RX ADMIN — PIPERACILLIN AND TAZOBACTAM 25 GRAM(S): 4; .5 INJECTION, POWDER, LYOPHILIZED, FOR SOLUTION INTRAVENOUS at 13:14

## 2018-10-10 RX ADMIN — OLANZAPINE 10 MILLIGRAM(S): 15 TABLET, FILM COATED ORAL at 05:54

## 2018-10-10 NOTE — PROGRESS NOTE ADULT - ATTENDING COMMENTS
pt with structural lung disease on home oxygen admitted with fever and resp failure   cont high flow oxygen and zosyn   check procalcitonin in am   viral RVP +   h/o TB ? treatment completion   no TB symptoms now

## 2018-10-10 NOTE — PROGRESS NOTE ADULT - SUBJECTIVE AND OBJECTIVE BOX
Amber HARTMANN [facility]  [phone]  ALLERGY nka   CONTACT Freddie Clark [phone]    VITALS/LABS                           10/10/2018 99 82 107/60 18 98% 40 l   10/10/2018 Pt has been uncooperative refuising bpap taking O2 off even when po was 80 despite coaxing her to put it back on   10/10/2018 W 13.9 hB 10.7 pLT 230 inr 116 Na 140 K 4.9 CO2 43 Cr .7   10/10/2018 .4 734/70/120   10/8 bc n   10/10/2018 pct .03   10/9/2018 afeb 70 105/63     REVIEW OF SYMPTOMS    Able to give ROS  NO     PHYSICAL EXAM    HEENT Unremarkable PERRLA atraumatic   RESP Fair air entry EXP prolonged    Harsh breath sound Resp distres mild   CARDIAC S1 S2 No S3     NO JVD    ABDOMEN SOFT BS PRESENT NOT DISTENDED No hepatosplenomegaly PEDAL EDEMA present No calf tenderness  NO rash   GENERAL Not TOXIC looking    GLOBAL ISSUE/BEST PRACTICE:      PROBLEM: HOB elevation:   y            PROBLEM: Stress ulcer proph:    protonix 40 (10/9)                       PROBLEM: VTE prophylaxis:      NA  PROBLEM: Glycemic control:    na  PROBLEM: Nutrition:    Mech soft (10/9)   PROBLEM: Advanced directive: na     PROBLEM: Allergies:  na    PATIENT DESCRIPTION 10/8/2018  ADMISSION [facility] VS DR CHAN PORTILLO  56 yo female w/schizophrenia, autism, h/o TB and untreated for a long time, R lung collapse, bronchiectasis, c/o 2 weeks of sob and cough, worse when laying down. Was admitted to ICU earlier this year on bipap for respiratory failure in setting of positive rvp. TB >10 years ago, no weight loss, night sweats, hemoptysis, denies any chest pain currently. In resp distress on eval with wet sounding breaths, improved with oxygen.  the fever began two days admitted LI VS 10/8/2018 with ac bronchitis and Pulm consulted 10/9/2018    PROBLEMS 10/8/2018  ADMISSION EM[facility] VS DR CHAN PORTILLO  CAD   Entero virus resp tract infection poa   Possible bacterial resp tract infection  Bectasis sec old TB   MIXED CO2 O2 RESP FAILURE   Agitation   Noncompliance     PROBLEM/ASSESSMENT/PLAN    PNEUMONIA   10/9/2018 Enterorhino detected   10/9/2018 CXr Chr postinflamm r hemthrx changes are stable   Zosyn (10/8)   10/10/2018 pct .03   ASSESSMENT/RECOMMENDATIONS  Bact infection is less likely    PROBLEM/ASSESSMENT/PLAN    PAST HO TB WITH FIBROTIC CHANGES    ASSESSMENT/RECOMMENDATIONS  10/9/2018  Will need to ro active TB after current illness is resolved and then consider INH   If she deteriorates during current admission then will reevaluate plan    PROBLEM/ASSESSMENT/PLAN    COPD EX   Solumed 40.2 (10/9)   Duoneb.4 (10/8)   Spiriva (10/8)   ASSESSMENT/RECOMMENDATIONS    Cont Rx     PROBLEM/ASSESSMENT/PLAN    ACUTE RESP FAILURE   10/10/2018 .4 734/70/120   10/9/2018 11a 100% 737/67/185   BPAP 18/8/.5 (10/9/2018 7p)   ASSESSMENT/RECOMMENDATIONS     10/9/2018 Monitor ABG vbg po       PROBLEM/ASSESSMENT/PLAN    CAD  ASA 81 (10/8)   ASSESSMENT/RECOMMENDATIONS  Cont Rx    PROBLEM/ASSESSMENT/PLAN    DEPRESSION  Aripiprazole 10 (10/9)   Benztropine 2.2 (10/8)   Depakote 500.2 (10/8)   Olanzapine 10.2 (10/8)   Sertraline 100 (10/8)   ASSESSMENT/RECOMMENDATIONS   Cont Rx      TIME SPENT Over 25 minutes aggregate care time spent on encounter; activities included   direct patient care, counseling and/or coordinating care reviewing notes, lab data/ imaging , discussion with multidisciplinary team/ patient  /family. Risks, benefits, alternatives  discussed in detail.

## 2018-10-10 NOTE — PROGRESS NOTE ADULT - PROBLEM SELECTOR PLAN 1
on antibiotics prophylactically as pt has a decompensated lung sec to extensive bronchiectasis(on home oxygen) due to previous TB infection   await procalcitonin   RVP + for viral infeciton

## 2018-10-10 NOTE — PROGRESS NOTE ADULT - SUBJECTIVE AND OBJECTIVE BOX
Patient is a 57y old  Female who presents with a chief complaint of fever/chills, cough, SOB, RAMIREZ (10 Oct 2018 13:41)      INTERVAL HPI / OVERNIGHT EVENTS: breathing slightly better, no fever    MEDICATIONS  (STANDING):  ALBUTerol    90 MICROgram(s) HFA Inhaler 1 Puff(s) Inhalation every 4 hours  ALBUTerol/ipratropium for Nebulization 3 milliLiter(s) Nebulizer every 6 hours  ARIPiprazole 10 milliGRAM(s) Oral daily  aspirin enteric coated 81 milliGRAM(s) Oral daily  benztropine 2 milliGRAM(s) Oral two times a day  buDESOnide 160 MICROgram(s)/formoterol 4.5 MICROgram(s) Inhaler 2 Puff(s) Inhalation two times a day  diVALproex  milliGRAM(s) Oral two times a day  methylPREDNISolone sodium succinate Injectable 40 milliGRAM(s) IV Push every 12 hours  OLANZapine 10 milliGRAM(s) Oral two times a day  pantoprazole    Tablet 40 milliGRAM(s) Oral before breakfast  piperacillin/tazobactam IVPB. 3.375 Gram(s) IV Intermittent every 8 hours  sertraline 100 milliGRAM(s) Oral daily  tiotropium 18 MICROgram(s) Capsule 1 Capsule(s) Inhalation daily    MEDICATIONS  (PRN):  acetaminophen   Tablet .. 325 milliGRAM(s) Oral every 4 hours PRN Temp greater or equal to 38C (100.4F), Mild Pain (1 - 3)      Vital Signs Last 24 Hrs  Tmax : Afebrile    PHYSICAL EXAM:  General :appears short of breath   Constitutional:  well-groomed, well-developed  Respiratory: CTAB/L, on high flow oxygen  Cardiovascular: S1 and S2, RRR, no M/G/R  Gastrointestinal: BS+, soft, NT/ND  Extremities: No peripheral edema  Vascular: 2+ peripheral pulses  Skin: No rashes      LABS:             WBC  13.9  cr 0.7            MICROBIOLOGY:  RECENT CULTURES:  10-09 .Urine Clean Catch (Midstream) XXXX XXXX   No growth    10-08 .Blood Blood-Peripheral XXXX XXXX   No growth to date.          RADIOLOGY & ADDITIONAL STUDIES:

## 2018-10-10 NOTE — PROGRESS NOTE ADULT - PROBLEM SELECTOR PLAN 2
hx of Bronchiectasis without complication, Collapse of right lung   likely gram neg pneumonia, ID following   cont on zosyn, follow up bcx/ucx

## 2018-10-10 NOTE — PROGRESS NOTE ADULT - PROBLEM SELECTOR PLAN 3
ALBUTerol/ipratropium for Nebulization 3 milliLiter(s) Nebulizer every 6 hours  solumedrol, add ppi  ID/pulm following

## 2018-10-10 NOTE — PROGRESS NOTE ADULT - SUBJECTIVE AND OBJECTIVE BOX
Patient is a 57y old  Female who presents with a chief complaint of acute on resp failure (09 Oct 2018 20:32)      OVERNIGHT EVENTS: +fever/chills, cough, SOB, RAMIREZ, much improved today    REVIEW OF SYSTEMS: denies chest pain/SOB, diaphoresis, no F/C, cough, dizziness, headache, blurry vision, nausea, vomiting, abdominal pain. All others review of systems negative     MEDICATIONS  (STANDING):  ALBUTerol    90 MICROgram(s) HFA Inhaler 1 Puff(s) Inhalation every 4 hours  ALBUTerol/ipratropium for Nebulization 3 milliLiter(s) Nebulizer every 6 hours  ARIPiprazole 10 milliGRAM(s) Oral daily  aspirin enteric coated 81 milliGRAM(s) Oral daily  benztropine 2 milliGRAM(s) Oral two times a day  buDESOnide 160 MICROgram(s)/formoterol 4.5 MICROgram(s) Inhaler 2 Puff(s) Inhalation two times a day  diVALproex  milliGRAM(s) Oral two times a day  methylPREDNISolone sodium succinate Injectable 40 milliGRAM(s) IV Push every 12 hours  OLANZapine 10 milliGRAM(s) Oral two times a day  pantoprazole    Tablet 40 milliGRAM(s) Oral before breakfast  piperacillin/tazobactam IVPB. 3.375 Gram(s) IV Intermittent every 8 hours  sertraline 100 milliGRAM(s) Oral daily  tiotropium 18 MICROgram(s) Capsule 1 Capsule(s) Inhalation daily    MEDICATIONS  (PRN):  acetaminophen   Tablet .. 325 milliGRAM(s) Oral every 4 hours PRN Temp greater or equal to 38C (100.4F), Mild Pain (1 - 3)      Allergies    No Known Allergies    Intolerances        T(F): 98.6 (10-10-18 @ 11:32), Max: 98.6 (10-10-18 @ 11:32)  HR: 68 (10-10-18 @ 12:10) (66 - 93)  BP: 126/65 (10-10-18 @ 11:32) (105/63 - 128/68)  RR: 18 (10-10-18 @ 11:32) (18 - 21)  SpO2: 95% (10-10-18 @ 12:10) (93% - 100%)  Wt(kg): --    PHYSICAL EXAM:  GENERAL: delirious, toxic looking, well-groomed, well-developed  HEAD:  Atraumatic, Normocephalic  EYES: EOMI, PERRLA, conjunctiva and sclera clear  ENMT: No tonsillar erythema, exudates, or enlargement; Moist mucous membranes, Good dentition, No lesions  NECK: Supple, No JVD, Normal thyroid  NERVOUS SYSTEM:  Alert & Oriented X2, Motor Strength 4/5 B/L upper and lower extremities; DTRs 2+ intact and symmetric  CHEST/LUNG: decreased bilaterally; wheezing and rhonchi BL  HEART: Regular rate and rhythm; No murmurs, rubs, or gallops  ABDOMEN: Soft, Nontender, Nondistended; Bowel sounds present  EXTREMITIES:  2+ Peripheral Pulses, No clubbing, cyanosis, or edema BL LE  LYMPH: No lymphadenopathy noted  SKIN: No rashes or lesions      LABS:                        10.   13.97 )-----------( 220      ( 10 Oct 2018 08:09 )             38.2     10-10    140  |  95<L>  |  19  ----------------------------<  120<H>  4.9   |  43<H>  |  0.70    Ca    8.7      10 Oct 2018 08:09  Phos  3.2     10-10  Mg     2.3     10-10    TPro  8.0  /  Alb  2.6<L>  /  TBili  0.4  /  DBili  x   /  AST  25  /  ALT  36  /  AlkPhos  83  10-10    PT/INR - ( 10 Oct 2018 08:09 )   PT: 12.9 sec;   INR: 1.18 ratio           Urinalysis Basic - ( 08 Oct 2018 22:29 )    Color: Yellow / Appearance: Clear / S.015 / pH: x  Gluc: x / Ketone: Negative  / Bili: Negative / Urobili: Negative mg/dL   Blood: x / Protein: 30 mg/dL / Nitrite: Negative   Leuk Esterase: Trace / RBC: 3-5 /HPF / WBC 0-2   Sq Epi: x / Non Sq Epi: Occasional / Bacteria: Few      Cultures;   CAPILLARY BLOOD GLUCOSE      RADIOLOGY & ADDITIONAL TESTS:    Imaging Personally Reviewed:  [x ] YES    < from: Xray Chest 1 View- PORTABLE-Urgent (10.08.18 @ 11:12) >  Chronic post-inflammatory right hemithorax changes - are stable.  Left lung is clear.    < end of copied text >      Consultant(s) Notes Reviewed:  [x ] YES     Care Discussed with [ x] Consultants [X ] Patient [x ] Family; brother Dr. Clark [phone]  [x ]    [x ]  Other; RN

## 2018-10-11 DIAGNOSIS — D72.829 ELEVATED WHITE BLOOD CELL COUNT, UNSPECIFIED: ICD-10-CM

## 2018-10-11 DIAGNOSIS — R50.81 FEVER PRESENTING WITH CONDITIONS CLASSIFIED ELSEWHERE: ICD-10-CM

## 2018-10-11 DIAGNOSIS — J20.8 ACUTE BRONCHITIS DUE TO OTHER SPECIFIED ORGANISMS: ICD-10-CM

## 2018-10-11 LAB
ANION GAP SERPL CALC-SCNC: 6 MMOL/L — SIGNIFICANT CHANGE UP (ref 5–17)
BUN SERPL-MCNC: 23 MG/DL — SIGNIFICANT CHANGE UP (ref 7–23)
CALCIUM SERPL-MCNC: 8.6 MG/DL — SIGNIFICANT CHANGE UP (ref 8.5–10.1)
CHLORIDE SERPL-SCNC: 94 MMOL/L — LOW (ref 96–108)
CO2 SERPL-SCNC: 40 MMOL/L — HIGH (ref 22–31)
CREAT SERPL-MCNC: 0.66 MG/DL — SIGNIFICANT CHANGE UP (ref 0.5–1.3)
GLUCOSE SERPL-MCNC: 153 MG/DL — HIGH (ref 70–99)
HCT VFR BLD CALC: 38.6 % — SIGNIFICANT CHANGE UP (ref 34.5–45)
HGB BLD-MCNC: 11.2 G/DL — LOW (ref 11.5–15.5)
MCHC RBC-ENTMCNC: 27.7 PG — SIGNIFICANT CHANGE UP (ref 27–34)
MCHC RBC-ENTMCNC: 29 GM/DL — LOW (ref 32–36)
MCV RBC AUTO: 95.5 FL — SIGNIFICANT CHANGE UP (ref 80–100)
NRBC # BLD: 0 /100 WBCS — SIGNIFICANT CHANGE UP (ref 0–0)
PLATELET # BLD AUTO: 230 K/UL — SIGNIFICANT CHANGE UP (ref 150–400)
POTASSIUM SERPL-MCNC: 4.7 MMOL/L — SIGNIFICANT CHANGE UP (ref 3.5–5.3)
POTASSIUM SERPL-SCNC: 4.7 MMOL/L — SIGNIFICANT CHANGE UP (ref 3.5–5.3)
RBC # BLD: 4.04 M/UL — SIGNIFICANT CHANGE UP (ref 3.8–5.2)
RBC # FLD: 16.2 % — HIGH (ref 10.3–14.5)
SODIUM SERPL-SCNC: 140 MMOL/L — SIGNIFICANT CHANGE UP (ref 135–145)
WBC # BLD: 11.7 K/UL — HIGH (ref 3.8–10.5)
WBC # FLD AUTO: 11.7 K/UL — HIGH (ref 3.8–10.5)

## 2018-10-11 PROCEDURE — 99232 SBSQ HOSP IP/OBS MODERATE 35: CPT

## 2018-10-11 PROCEDURE — 99233 SBSQ HOSP IP/OBS HIGH 50: CPT

## 2018-10-11 RX ORDER — AZITHROMYCIN 500 MG/1
250 TABLET, FILM COATED ORAL DAILY
Qty: 0 | Refills: 0 | Status: DISCONTINUED | OUTPATIENT
Start: 2018-10-11 | End: 2018-10-15

## 2018-10-11 RX ADMIN — BUDESONIDE AND FORMOTEROL FUMARATE DIHYDRATE 2 PUFF(S): 160; 4.5 AEROSOL RESPIRATORY (INHALATION) at 17:13

## 2018-10-11 RX ADMIN — AZITHROMYCIN 250 MILLIGRAM(S): 500 TABLET, FILM COATED ORAL at 17:09

## 2018-10-11 RX ADMIN — SERTRALINE 100 MILLIGRAM(S): 25 TABLET, FILM COATED ORAL at 11:33

## 2018-10-11 RX ADMIN — BUDESONIDE AND FORMOTEROL FUMARATE DIHYDRATE 2 PUFF(S): 160; 4.5 AEROSOL RESPIRATORY (INHALATION) at 05:52

## 2018-10-11 RX ADMIN — Medication 81 MILLIGRAM(S): at 11:33

## 2018-10-11 RX ADMIN — Medication 2 MILLIGRAM(S): at 17:09

## 2018-10-11 RX ADMIN — OLANZAPINE 10 MILLIGRAM(S): 15 TABLET, FILM COATED ORAL at 17:10

## 2018-10-11 RX ADMIN — DIVALPROEX SODIUM 500 MILLIGRAM(S): 500 TABLET, DELAYED RELEASE ORAL at 17:09

## 2018-10-11 RX ADMIN — Medication 40 MILLIGRAM(S): at 17:09

## 2018-10-11 RX ADMIN — Medication 3 MILLILITER(S): at 17:09

## 2018-10-11 RX ADMIN — PIPERACILLIN AND TAZOBACTAM 25 GRAM(S): 4; .5 INJECTION, POWDER, LYOPHILIZED, FOR SOLUTION INTRAVENOUS at 05:49

## 2018-10-11 RX ADMIN — PANTOPRAZOLE SODIUM 40 MILLIGRAM(S): 20 TABLET, DELAYED RELEASE ORAL at 05:51

## 2018-10-11 RX ADMIN — Medication 40 MILLIGRAM(S): at 05:50

## 2018-10-11 RX ADMIN — ARIPIPRAZOLE 10 MILLIGRAM(S): 15 TABLET ORAL at 11:33

## 2018-10-11 RX ADMIN — OLANZAPINE 10 MILLIGRAM(S): 15 TABLET, FILM COATED ORAL at 05:51

## 2018-10-11 RX ADMIN — Medication 2 MILLIGRAM(S): at 05:50

## 2018-10-11 RX ADMIN — Medication 3 MILLILITER(S): at 05:48

## 2018-10-11 RX ADMIN — DIVALPROEX SODIUM 500 MILLIGRAM(S): 500 TABLET, DELAYED RELEASE ORAL at 05:50

## 2018-10-11 RX ADMIN — Medication 3 MILLILITER(S): at 11:14

## 2018-10-11 RX ADMIN — Medication 3 MILLILITER(S): at 00:00

## 2018-10-11 NOTE — PROGRESS NOTE ADULT - SUBJECTIVE AND OBJECTIVE BOX
Patient is a 57y old  Female who presents with a chief complaint of fever/chills, cough, SOB, RAMIREZ (10 Oct 2018 13:41)      OVERNIGHT EVENTS: Cough improving     REVIEW OF SYSTEMS: denies chest pain/SOB, diaphoresis, no F/C, dizziness, headache, blurry vision, nausea, vomiting, abdominal pain. All others review of systems negative     MEDICATIONS  (STANDING):  ALBUTerol    90 MICROgram(s) HFA Inhaler 1 Puff(s) Inhalation every 4 hours  ALBUTerol/ipratropium for Nebulization 3 milliLiter(s) Nebulizer every 6 hours  ARIPiprazole 10 milliGRAM(s) Oral daily  aspirin enteric coated 81 milliGRAM(s) Oral daily  azithromycin   Tablet 250 milliGRAM(s) Oral daily  benztropine 2 milliGRAM(s) Oral two times a day  buDESOnide 160 MICROgram(s)/formoterol 4.5 MICROgram(s) Inhaler 2 Puff(s) Inhalation two times a day  diVALproex  milliGRAM(s) Oral two times a day  methylPREDNISolone sodium succinate Injectable 40 milliGRAM(s) IV Push every 12 hours  OLANZapine 10 milliGRAM(s) Oral two times a day  pantoprazole    Tablet 40 milliGRAM(s) Oral before breakfast  sertraline 100 milliGRAM(s) Oral daily  tiotropium 18 MICROgram(s) Capsule 1 Capsule(s) Inhalation daily    MEDICATIONS  (PRN):  acetaminophen   Tablet .. 325 milliGRAM(s) Oral every 4 hours PRN Temp greater or equal to 38C (100.4F), Mild Pain (1 - 3)      Allergies    No Known Allergies    Intolerances        T(F): 97 (10-11-18 @ 12:15), Max: 99 (10-10-18 @ 16:50)  HR: 74 (10-11-18 @ 12:15) (65 - 91)  BP: 103/62 (10-11-18 @ 12:15) (90/55 - 113/62)  RR: 18 (10-11-18 @ 12:15) (17 - 18)  SpO2: 94% (10-11-18 @ 12:15) (94% - 98%)  Wt(kg): --    PHYSICAL EXAM:  GENERAL: delirious, toxic looking, well-groomed, well-developed  HEAD:  Atraumatic, Normocephalic  EYES: EOMI, PERRLA, conjunctiva and sclera clear  ENMT: No tonsillar erythema, exudates, or enlargement; Moist mucous membranes, Good dentition, No lesions  NECK: Supple, No JVD, Normal thyroid  NERVOUS SYSTEM:  Alert & Oriented X2, Motor Strength 4/5 B/L upper and lower extremities; DTRs 2+ intact and symmetric  CHEST/LUNG: decreased bilaterally; wheezing and rhonchi BL  HEART: Regular rate and rhythm; No murmurs, rubs, or gallops  ABDOMEN: Soft, Nontender, Nondistended; Bowel sounds present  EXTREMITIES:  2+ Peripheral Pulses, No clubbing, cyanosis, or edema BL LE  LYMPH: No lymphadenopathy noted  SKIN: No rashes or lesions    LABS:                        11.2   11.70 )-----------( 230      ( 11 Oct 2018 09:19 )             38.6     10-11    140  |  94<L>  |  23  ----------------------------<  153<H>  4.7   |  40<H>  |  0.66    Ca    8.6      11 Oct 2018 09:19  Phos  3.2     10-10  Mg     2.3     10-10    TPro  8.0  /  Alb  2.6<L>  /  TBili  0.4  /  DBili  x   /  AST  25  /  ALT  36  /  AlkPhos  83  10-10    PT/INR - ( 10 Oct 2018 08:09 )   PT: 12.9 sec;   INR: 1.18 ratio             Cultures;   CAPILLARY BLOOD GLUCOSE        RADIOLOGY & ADDITIONAL TESTS:    Imaging Personally Reviewed:  [x ] YES    < from: Xray Chest 1 View- PORTABLE-Urgent (10.08.18 @ 11:12) >  Chronic post-inflammatory right hemithorax changes - are stable.  Left lung is clear.    < end of copied text >      Consultant(s) Notes Reviewed:  [x ] YES     Care Discussed with [ x] Consultants [X ] Patient [x ] Family; brother Dr. Clark [phone]  [x ]    [x ]  Other; RN

## 2018-10-11 NOTE — PROGRESS NOTE ADULT - ASSESSMENT
56 y/o woman with end stage lung diease , severe bronchiectasia with an exacerbation .still dyspneic    continue inhaled bronchodilators  culture sputum , procalcitonin level to see if evidence of a bacterial infection continue BIPAP hs and prn during the day    spoke with family at bedside and attending hospatilist

## 2018-10-11 NOTE — PROGRESS NOTE ADULT - SUBJECTIVE AND OBJECTIVE BOX
HPI:  56 yo female w/schizophrenia, autism, h/o TB and untreated for a long time, R lung collapse, bronchiectasis, c/o 2 weeks of sob and cough, worse when laying down. Was admitted to ICU earlier this year on bipap for respiratory failure in setting of positive rvp. TB >10 years ago, no weight loss, night sweats, hemoptysis, denies any chest pain currently. In resp distress on eval with wet sounding breaths, improved with oxygen.  the fever began two days (08 Oct 2018 16:33). continues to complain of  dyspnea , minimal sputum    MEDICATIONS  (STANDING):  ALBUTerol    90 MICROgram(s) HFA Inhaler 1 Puff(s) Inhalation every 4 hours  ALBUTerol/ipratropium for Nebulization 3 milliLiter(s) Nebulizer every 6 hours  ARIPiprazole 10 milliGRAM(s) Oral daily  aspirin enteric coated 81 milliGRAM(s) Oral daily  azithromycin   Tablet 250 milliGRAM(s) Oral daily  benztropine 2 milliGRAM(s) Oral two times a day  buDESOnide 160 MICROgram(s)/formoterol 4.5 MICROgram(s) Inhaler 2 Puff(s) Inhalation two times a day  diVALproex  milliGRAM(s) Oral two times a day  methylPREDNISolone sodium succinate Injectable 40 milliGRAM(s) IV Push every 12 hours  OLANZapine 10 milliGRAM(s) Oral two times a day  pantoprazole    Tablet 40 milliGRAM(s) Oral before breakfast  sertraline 100 milliGRAM(s) Oral daily  tiotropium 18 MICROgram(s) Capsule 1 Capsule(s) Inhalation daily    Vital Signs Last 24 Hrs  T(C): 36.1 (11 Oct 2018 12:15), Max: 37.2 (10 Oct 2018 16:50)  T(F): 97 (11 Oct 2018 12:15), Max: 99 (10 Oct 2018 16:50)  HR: 75 (11 Oct 2018 14:27) (65 - 91)  BP: 103/62 (11 Oct 2018 12:15) (90/55 - 113/62)  BP(mean): --  RR: 18 (11 Oct 2018 14:27) (17 - 18)  SpO2: 95% (11 Oct 2018 14:27) (94% - 98%)  PE awake  HEENT  unremarkable  Chest  decreased BS scattered rhohci bronchial BS on right  Cor  S1S2 no murmur  Abdomen soft obese , nopain  Ext   no CCCE  Neuro    10-11    140  |  94<L>  |  23  ----------------------------<  153<H>  4.7   |  40<H>  |  0.66    Ca    8.6      11 Oct 2018 09:19  Phos  3.2     10-10  Mg     2.3     10-10    TPro  8.0  /  Alb  2.6<L>  /  TBili  0.4  /  DBili  x   /  AST  25  /  ALT  36  /  AlkPhos  83  10-10    CBC Full  -  ( 11 Oct 2018 09:19 )  WBC Count : 11.70 K/uL  Hemoglobin : 11.2 g/dL  Hematocrit : 38.6 %  Platelet Count - Automated : 230 K/uL  Mean Cell Volume : 95.5 fl  Mean Cell Hemoglobin : 27.7 pg  Mean Cell Hemoglobin Concentration : 29.0 gm/dL  Auto Neutrophil # : x  Auto Lymphocyte # : x  Auto Monocyte # : x  Auto Eosinophil # : x  Auto Basophil # : x  Auto Neutrophil % : x  Auto Lymphocyte % : x  Auto Monocyte % : x  Auto Eosinophil % : x  Auto Basophil % : x    ABG - ( 10 Oct 2018 10:10 )  pH, Arterial: x     pH, Blood: 7.34  /  pCO2: 70    /  pO2: 120   / HCO3: 37    / Base Excess: 9.4   /  SaO2: 97                HEALTH ISSUES - R/O PROBLEM Dx:

## 2018-10-11 NOTE — PROGRESS NOTE ADULT - SUBJECTIVE AND OBJECTIVE BOX
Patient is a 57y old  Female who presents with a chief complaint of fever/chills, cough, SOB, RAMIREZ (10 Oct 2018 13:41)      INTERVAL HPI / OVERNIGHT EVENTS: continues to be on high flow oxygen    MEDICATIONS  (STANDING):  ALBUTerol    90 MICROgram(s) HFA Inhaler 1 Puff(s) Inhalation every 4 hours  ALBUTerol/ipratropium for Nebulization 3 milliLiter(s) Nebulizer every 6 hours  ARIPiprazole 10 milliGRAM(s) Oral daily  aspirin enteric coated 81 milliGRAM(s) Oral daily  azithromycin   Tablet 250 milliGRAM(s) Oral daily  benztropine 2 milliGRAM(s) Oral two times a day  buDESOnide 160 MICROgram(s)/formoterol 4.5 MICROgram(s) Inhaler 2 Puff(s) Inhalation two times a day  diVALproex  milliGRAM(s) Oral two times a day  methylPREDNISolone sodium succinate Injectable 40 milliGRAM(s) IV Push every 12 hours  OLANZapine 10 milliGRAM(s) Oral two times a day  pantoprazole    Tablet 40 milliGRAM(s) Oral before breakfast  sertraline 100 milliGRAM(s) Oral daily  tiotropium 18 MICROgram(s) Capsule 1 Capsule(s) Inhalation daily    MEDICATIONS  (PRN):  acetaminophen   Tablet .. 325 milliGRAM(s) Oral every 4 hours PRN Temp greater or equal to 38C (100.4F), Mild Pain (1 - 3)      Vital Signs Last 24 Hrs  T(C): 36.1 (11 Oct 2018 12:15), Max: 37.2 (10 Oct 2018 16:50)  T(F): 97 (11 Oct 2018 12:15), Max: 99 (10 Oct 2018 16:50)  HR: 75 (11 Oct 2018 14:27) (65 - 91)  BP: 103/62 (11 Oct 2018 12:15) (90/55 - 113/62)  BP(mean): --  RR: 18 (11 Oct 2018 14:27) (17 - 18)  SpO2: 95% (11 Oct 2018 14:27) (94% - 98%)    PHYSICAL EXAM:  General :NAD  Constitutional:  well-groomed, well-developed  Respiratory: CTAB/L  Cardiovascular: S1 and S2, RRR, no M/G/R  Gastrointestinal: BS+, soft, NT/ND  Extremities: No peripheral edema  Vascular: 2+ peripheral pulses  Skin: No rashes      LABS:                        11.2   11.70 )-----------( 230      ( 11 Oct 2018 09:19 )             38.6     10-11    140  |  94<L>  |  23  ----------------------------<  153<H>  4.7   |  40<H>  |  0.66    Ca    8.6      11 Oct 2018 09:19  Phos  3.2     10-10  Mg     2.3     10-10    TPro  8.0  /  Alb  2.6<L>  /  TBili  0.4  /  DBili  x   /  AST  25  /  ALT  36  /  AlkPhos  83  10-10          MICROBIOLOGY:  RECENT CULTURES:  10-09 .Urine Clean Catch (Midstream) XXXX XXXX   No growth    10-08 .Blood Blood-Peripheral XXXX XXXX   No growth to date.          RADIOLOGY & ADDITIONAL STUDIES:

## 2018-10-11 NOTE — PROGRESS NOTE ADULT - PROBLEM SELECTOR PLAN 1
d/c high flow nasal cannula and BiPAP  o2 supplemented, resp care   monitor vitals and pulse oxymetry

## 2018-10-11 NOTE — PROGRESS NOTE ADULT - PROBLEM SELECTOR PLAN 3
ALBUTerol/ipratropium for Nebulization 3 milliLiter(s) Nebulizer every 6 hours  solumedrol, ppi  ID/pulm following

## 2018-10-11 NOTE — PROGRESS NOTE ADULT - PROBLEM SELECTOR PLAN 1
on antibiotics prophylactically as pt has a decompensated lung sec to extensive bronchiectasis(on home oxygen) due to previous TB infection   procalcitonin neg   RVP + for viral infection  switch zosyn to po azithro for 3 more days

## 2018-10-12 ENCOUNTER — TRANSCRIPTION ENCOUNTER (OUTPATIENT)
Age: 58
End: 2018-10-12

## 2018-10-12 LAB
ANION GAP SERPL CALC-SCNC: 5 MMOL/L — SIGNIFICANT CHANGE UP (ref 5–17)
BASE EXCESS BLDA CALC-SCNC: 15.4 MMOL/L — HIGH (ref -2–2)
BLOOD GAS COMMENTS: SIGNIFICANT CHANGE UP
BLOOD GAS COMMENTS: SIGNIFICANT CHANGE UP
BLOOD GAS SOURCE: SIGNIFICANT CHANGE UP
BUN SERPL-MCNC: 22 MG/DL — SIGNIFICANT CHANGE UP (ref 7–23)
CALCIUM SERPL-MCNC: 9.1 MG/DL — SIGNIFICANT CHANGE UP (ref 8.5–10.1)
CHLORIDE SERPL-SCNC: 97 MMOL/L — SIGNIFICANT CHANGE UP (ref 96–108)
CO2 SERPL-SCNC: 41 MMOL/L — HIGH (ref 22–31)
CREAT SERPL-MCNC: 0.63 MG/DL — SIGNIFICANT CHANGE UP (ref 0.5–1.3)
GLUCOSE SERPL-MCNC: 109 MG/DL — HIGH (ref 70–99)
HCO3 BLDA-SCNC: 42 MMOL/L — HIGH (ref 21–29)
HCT VFR BLD CALC: 39.7 % — SIGNIFICANT CHANGE UP (ref 34.5–45)
HGB BLD-MCNC: 11.6 G/DL — SIGNIFICANT CHANGE UP (ref 11.5–15.5)
HOROWITZ INDEX BLDA+IHG-RTO: 32 — SIGNIFICANT CHANGE UP
MCHC RBC-ENTMCNC: 27 PG — SIGNIFICANT CHANGE UP (ref 27–34)
MCHC RBC-ENTMCNC: 29.2 GM/DL — LOW (ref 32–36)
MCV RBC AUTO: 92.5 FL — SIGNIFICANT CHANGE UP (ref 80–100)
NRBC # BLD: 0 /100 WBCS — SIGNIFICANT CHANGE UP (ref 0–0)
PCO2 BLDA: 62 MMHG — HIGH (ref 32–46)
PH BLD: 7.44 — SIGNIFICANT CHANGE UP (ref 7.35–7.45)
PLATELET # BLD AUTO: 239 K/UL — SIGNIFICANT CHANGE UP (ref 150–400)
PO2 BLDA: 75 MMHG — SIGNIFICANT CHANGE UP (ref 74–108)
POTASSIUM SERPL-MCNC: 4.4 MMOL/L — SIGNIFICANT CHANGE UP (ref 3.5–5.3)
POTASSIUM SERPL-SCNC: 4.4 MMOL/L — SIGNIFICANT CHANGE UP (ref 3.5–5.3)
PROCALCITONIN SERPL-MCNC: 0.04 NG/ML — SIGNIFICANT CHANGE UP (ref 0.02–0.1)
RBC # BLD: 4.29 M/UL — SIGNIFICANT CHANGE UP (ref 3.8–5.2)
RBC # FLD: 16.3 % — HIGH (ref 10.3–14.5)
S PNEUM AG SER QL: SIGNIFICANT CHANGE UP
SAO2 % BLDA: 93 % — SIGNIFICANT CHANGE UP (ref 92–96)
SODIUM SERPL-SCNC: 143 MMOL/L — SIGNIFICANT CHANGE UP (ref 135–145)
WBC # BLD: 10.03 K/UL — SIGNIFICANT CHANGE UP (ref 3.8–10.5)
WBC # FLD AUTO: 10.03 K/UL — SIGNIFICANT CHANGE UP (ref 3.8–10.5)

## 2018-10-12 PROCEDURE — 99232 SBSQ HOSP IP/OBS MODERATE 35: CPT

## 2018-10-12 PROCEDURE — 99233 SBSQ HOSP IP/OBS HIGH 50: CPT

## 2018-10-12 RX ORDER — AZITHROMYCIN 500 MG/1
1 TABLET, FILM COATED ORAL
Qty: 4 | Refills: 0 | OUTPATIENT
Start: 2018-10-12 | End: 2018-10-15

## 2018-10-12 RX ORDER — OLANZAPINE 15 MG/1
1 TABLET, FILM COATED ORAL
Qty: 0 | Refills: 0 | DISCHARGE
Start: 2018-10-12

## 2018-10-12 RX ORDER — PANTOPRAZOLE SODIUM 20 MG/1
1 TABLET, DELAYED RELEASE ORAL
Qty: 30 | Refills: 0 | OUTPATIENT
Start: 2018-10-12 | End: 2018-11-10

## 2018-10-12 RX ADMIN — Medication 40 MILLIGRAM(S): at 05:15

## 2018-10-12 RX ADMIN — DIVALPROEX SODIUM 500 MILLIGRAM(S): 500 TABLET, DELAYED RELEASE ORAL at 05:15

## 2018-10-12 RX ADMIN — BUDESONIDE AND FORMOTEROL FUMARATE DIHYDRATE 2 PUFF(S): 160; 4.5 AEROSOL RESPIRATORY (INHALATION) at 19:12

## 2018-10-12 RX ADMIN — Medication 3 MILLILITER(S): at 05:28

## 2018-10-12 RX ADMIN — OLANZAPINE 10 MILLIGRAM(S): 15 TABLET, FILM COATED ORAL at 17:08

## 2018-10-12 RX ADMIN — Medication 3 MILLILITER(S): at 00:24

## 2018-10-12 RX ADMIN — AZITHROMYCIN 250 MILLIGRAM(S): 500 TABLET, FILM COATED ORAL at 11:10

## 2018-10-12 RX ADMIN — Medication 3 MILLILITER(S): at 17:13

## 2018-10-12 RX ADMIN — Medication 40 MILLIGRAM(S): at 17:09

## 2018-10-12 RX ADMIN — SERTRALINE 100 MILLIGRAM(S): 25 TABLET, FILM COATED ORAL at 11:10

## 2018-10-12 RX ADMIN — DIVALPROEX SODIUM 500 MILLIGRAM(S): 500 TABLET, DELAYED RELEASE ORAL at 19:12

## 2018-10-12 RX ADMIN — ARIPIPRAZOLE 10 MILLIGRAM(S): 15 TABLET ORAL at 11:09

## 2018-10-12 RX ADMIN — OLANZAPINE 10 MILLIGRAM(S): 15 TABLET, FILM COATED ORAL at 05:15

## 2018-10-12 RX ADMIN — Medication 2 MILLIGRAM(S): at 17:08

## 2018-10-12 RX ADMIN — Medication 81 MILLIGRAM(S): at 11:10

## 2018-10-12 RX ADMIN — Medication 3 MILLILITER(S): at 11:32

## 2018-10-12 RX ADMIN — Medication 2 MILLIGRAM(S): at 05:15

## 2018-10-12 RX ADMIN — BUDESONIDE AND FORMOTEROL FUMARATE DIHYDRATE 2 PUFF(S): 160; 4.5 AEROSOL RESPIRATORY (INHALATION) at 05:15

## 2018-10-12 NOTE — PROGRESS NOTE ADULT - SUBJECTIVE AND OBJECTIVE BOX
Patient is a 57y old  Female who presents with a chief complaint of dysnea (12 Oct 2018 14:25)      OVERNIGHT EVENTS:   Cough improving     REVIEW OF SYSTEMS: denies chest pain/SOB, diaphoresis, no F/C, dizziness, headache, blurry vision, nausea, vomiting, abdominal pain. All others review of systems negative     MEDICATIONS  (STANDING):  ALBUTerol    90 MICROgram(s) HFA Inhaler 1 Puff(s) Inhalation every 4 hours  ALBUTerol/ipratropium for Nebulization 3 milliLiter(s) Nebulizer every 6 hours  ARIPiprazole 10 milliGRAM(s) Oral daily  aspirin enteric coated 81 milliGRAM(s) Oral daily  azithromycin   Tablet 250 milliGRAM(s) Oral daily  benztropine 2 milliGRAM(s) Oral two times a day  buDESOnide 160 MICROgram(s)/formoterol 4.5 MICROgram(s) Inhaler 2 Puff(s) Inhalation two times a day  diVALproex  milliGRAM(s) Oral two times a day  methylPREDNISolone sodium succinate Injectable 40 milliGRAM(s) IV Push every 12 hours  OLANZapine 10 milliGRAM(s) Oral two times a day  pantoprazole    Tablet 40 milliGRAM(s) Oral before breakfast  sertraline 100 milliGRAM(s) Oral daily  tiotropium 18 MICROgram(s) Capsule 1 Capsule(s) Inhalation daily    MEDICATIONS  (PRN):  acetaminophen   Tablet .. 325 milliGRAM(s) Oral every 4 hours PRN Temp greater or equal to 38C (100.4F), Mild Pain (1 - 3)      Allergies    No Known Allergies    Intolerances        T(F): 97 (10-13-18 @ 05:30), Max: 98.7 (10-12-18 @ 17:53)  HR: 61 (10-13-18 @ 08:45) (61 - 87)  BP: 110/57 (10-13-18 @ 05:30) (94/57 - 112/60)  RR: 18 (10-13-18 @ 05:30) (18 - 18)  SpO2: 95% (10-13-18 @ 08:45) (91% - 99%)  Wt(kg): --    PHYSICAL EXAM:  GENERAL: delirious, toxic looking, well-groomed, well-developed  HEAD:  Atraumatic, Normocephalic  EYES: EOMI, PERRLA, conjunctiva and sclera clear  ENMT: No tonsillar erythema, exudates, or enlargement; Moist mucous membranes, Good dentition, No lesions  NECK: Supple, No JVD, Normal thyroid  NERVOUS SYSTEM:  Alert & Oriented X2, Motor Strength 4/5 B/L upper and lower extremities; DTRs 2+ intact and symmetric  CHEST/LUNG: decreased bilaterally; wheezing and rhonchi BL  HEART: Regular rate and rhythm; No murmurs, rubs, or gallops  ABDOMEN: Soft, Nontender, Nondistended; Bowel sounds present  EXTREMITIES:  2+ Peripheral Pulses, No clubbing, cyanosis, or edema BL LE  LYMPH: No lymphadenopathy noted  SKIN: No rashes or lesions      LABS:                        11.0   13.61 )-----------( 257      ( 13 Oct 2018 07:26 )             37.3     10-13    144  |  98  |  26<H>  ----------------------------<  84  4.2   |  42<H>  |  0.51    Ca    8.3<L>      13 Oct 2018 07:26          Cultures;   CAPILLARY BLOOD GLUCOSE        RADIOLOGY & ADDITIONAL TESTS:    Imaging Personally Reviewed:  [x ] YES    < from: Xray Chest 1 View- PORTABLE-Urgent (10.08.18 @ 11:12) >  Chronic post-inflammatory right hemithorax changes - are stable.  Left lung is clear.      Consultant(s) Notes Reviewed:  [x ] YES     Care Discussed with [ x] Consultants [X ] Patient [x ] Family; brother Dr. Clark [phone]  [x ]    [x ]  Other; RN

## 2018-10-12 NOTE — DISCHARGE NOTE ADULT - SECONDARY DIAGNOSIS.
Acute exacerbation of chronic obstructive pulmonary disease Pneumonia due to infectious organism, unspecified laterality, unspecified part of lung Acute metabolic encephalopathy Schizophrenia, unspecified type Bronchiectasis with (acute) exacerbation

## 2018-10-12 NOTE — PROGRESS NOTE ADULT - PROBLEM SELECTOR PLAN 3
on antibiotics prophylactically as pt has a decompensated lung sec to extensive bronchiectasis(on home oxygen) due to previous TB infection  ID following   cont on azithro

## 2018-10-12 NOTE — PROGRESS NOTE ADULT - PROBLEM SELECTOR PLAN 1
off high flow nasal cannula and BiPAP  o2 supplemented, resp care   monitor vitals and pulse oxymetry.

## 2018-10-12 NOTE — DISCHARGE NOTE ADULT - MEDICATION SUMMARY - MEDICATIONS TO STOP TAKING
I will STOP taking the medications listed below when I get home from the hospital:    Deltasone 20 mg oral tablet  -- 2 tab(s) by mouth once a day   -- It is very important that you take or use this exactly as directed.  Do not skip doses or discontinue unless directed by your doctor.  Obtain medical advice before taking any non-prescription drugs as some may affect the action of this medication.  Take with food or milk.    ipratropium 500 mcg/2.5 mL inhalation solution  -- 2.5 milliliter(s) by nebulizer 3 times a day   -- For inhalation only.  It is very important that you take or use this exactly as directed.  Do not skip doses or discontinue unless directed by your doctor.    predniSONE 20 mg oral tablet  -- 1 tab(s) by mouth once a day   -- It is very important that you take or use this exactly as directed.  Do not skip doses or discontinue unless directed by your doctor.  Obtain medical advice before taking any non-prescription drugs as some may affect the action of this medication.  Take with food or milk.    azithromycin 250 mg oral tablet  -- 1 tab(s) by mouth once a day  -- Do not take dairy products, antacids, or iron preparations within one hour of this medication.  Finish all this medication unless otherwise directed by prescriber.

## 2018-10-12 NOTE — DISCHARGE NOTE ADULT - CARE PROVIDERS DIRECT ADDRESSES
[email or web address]

## 2018-10-12 NOTE — DISCHARGE NOTE ADULT - NSTOBACCOHOTLINE_GEN_A_CS
[facility] Smokers Quitline ([phone]) [facility] Smokers Quitline ([phone])

## 2018-10-12 NOTE — PROGRESS NOTE ADULT - ASSESSMENT
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 Admitted with acute on chronic hypercapnic respiratory failure, PNA and elevated troponin. RVP positive for RSV.

## 2018-10-12 NOTE — DISCHARGE NOTE ADULT - PLAN OF CARE
on magaly o2 cont on home o2, home meds, follow up w/pulm complete abx for 4 more days resolved home meds

## 2018-10-12 NOTE — DISCHARGE NOTE ADULT - CARE PLAN
Principal Discharge DX:	Acute and chronic respiratory failure with hypercapnia  Goal:	on magaly o2  Assessment and plan of treatment:	cont on home o2, home meds, follow up w/pulm  Secondary Diagnosis:	Acute exacerbation of chronic obstructive pulmonary disease  Assessment and plan of treatment:	cont on home o2, home meds, follow up w/pulm  Secondary Diagnosis:	Pneumonia due to infectious organism, unspecified laterality, unspecified part of lung  Assessment and plan of treatment:	complete abx for 4 more days  Secondary Diagnosis:	Acute metabolic encephalopathy  Goal:	resolved  Secondary Diagnosis:	Schizophrenia, unspecified type  Assessment and plan of treatment:	home meds  Secondary Diagnosis:	Bronchiectasis with (acute) exacerbation  Assessment and plan of treatment:	cont on home o2, home meds, follow up w/pulm

## 2018-10-12 NOTE — DISCHARGE NOTE ADULT - CARE PROVIDER_API CALL
Abdirizak Sue), Internal Medicine; Pulmonary Disease  [address]  [place] [address]  Phone: [phone]  Fax: [phone]    Blank Jimenez), Infectious Disease; Internal Medicine  [address] [place] [address]  Phone: [phone]  Fax: [phone]

## 2018-10-12 NOTE — DISCHARGE NOTE ADULT - HOSPITAL COURSE
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 Admitted with acute on chronic hypercapnic respiratory failure  PNA and elevated troponin. RVP positive for RSV. admitted for Acute respiratory failure with hypoxia and hypercapnia. was on high flow nasal cannula and BiPAP titrated to o2 supplemented, resp care. monitor vitals and pulse oxymetry. Pneumonia. hx of Bronchiectasis without complication, Collapse of right lung likely gram neg pneumonia, ID following   completed on zosyn, neg bcx/ucx. Acute exacerbation of chronic obstructive pulmonary disease.  Plan: ALBUTerol/ipratropium for Nebulization 3 milliLiter(s) Nebulizer every 6 hours, prednisone taper off, ppi. Acute metabolic encephalopathy.  Plan: could be due to current infection and psych-meds. Schizophrenia, unspecified type.  Plan: continue with aripiprazole, divalproex, benztropine. seen by psych and zyprexa added. stable for d/c home and follow up with pulm and ID. 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 Admitted with acute on chronic hypercapnic respiratory failure  PNA and elevated troponin. RVP positive for RSV. admitted for Acute respiratory failure with hypoxia and hypercapnia. was on high flow nasal cannula and BiPAP titrated to o2 supplemented, resp care. monitor vitals and pulse oxymetry. Pneumonia. hx of Bronchiectasis without complication, Collapse of right lung likely gram neg pneumonia, ID following   completed on zosyn, neg bcx/ucx. Acute exacerbation of chronic obstructive pulmonary disease.  Plan: ALBUTerol/ipratropium for Nebulization 3 milliLiter(s) Nebulizer every 6 hours, prednisone taper off, ppi. Acute metabolic encephalopathy.  Plan: could be due to current infection and psych-meds. Schizophrenia, unspecified type.  Plan: continue with aripiprazole, divalproex, benztropine. seen by psych and zyprexa added. stable for d/c home and follow up with pulm and ID.     NIV is being order for the pt's chronic resp failure 2/2 COPD. BIPAP has been tried and was proven ineffective. AVAPS AE mode is needed for therapy and back up battery is required for continous use in order to address daytime exacerbation.

## 2018-10-12 NOTE — DISCHARGE NOTE ADULT - MEDICATION SUMMARY - MEDICATIONS TO TAKE
I will START or STAY ON the medications listed below when I get home from the hospital:    predniSONE 10 mg oral tablet  -- 4 tab(s) oral - by mouth once a day x 3 days  3 tab(s) oral - by mouth once a day x 3 days  2 tab(s) oral - by mouth once a day x 3 days  1 tab(s) oral - by mouth once a day x 3 days  -- It is very important that you take or use this exactly as directed.  Do not skip doses or discontinue unless directed by your doctor.  Obtain medical advice before taking any non-prescription drugs as some may affect the action of this medication.  Take with food or milk.    -- Indication: For Acute exacerbation of chronic obstructive pulmonary disease    aspirin 81 mg oral delayed release tablet  -- 1 tab(s) by mouth once a day  -- Indication: For Ppx    divalproex sodium 500 mg oral delayed release tablet  -- 1 tab(s) by mouth 2 times a day  -- Indication: For Schizophrenia, unspecified type    sertraline 100 mg oral tablet  -- 1 tab(s) by mouth once a day  -- Indication: For Schizophrenia, unspecified type    benztropine 2 mg oral tablet  -- 1 tab(s) by mouth 2 times a day  -- Indication: For Schizophrenia, unspecified type    ARIPiprazole 10 mg oral tablet, disintegrating  -- 1 tab(s) by mouth once a day  -- Indication: For Schizophrenia, unspecified type    OLANZapine 10 mg oral tablet  -- 1 tab(s) by mouth 2 times a day  -- Indication: For Schizophrenia, unspecified type    albuterol 90 mcg/inh inhalation aerosol  -- 1 puff(s) inhaled every 4 hours  -- Indication: For Acute and chronic respiratory failure with hypercapnia    budesonide-formoterol 160 mcg-4.5 mcg/inh inhalation aerosol  -- 1 puff(s) inhaled 2 times a day   -- Indication: For Acute and chronic respiratory failure with hypercapnia    tiotropium 18 mcg inhalation capsule  -- 1 cap(s) inhaled once a day  -- Indication: For Acute and chronic respiratory failure with hypercapnia    albuterol 1.25 mg/3 mL (0.042%) inhalation solution  -- 3 milliliter(s) by nebulizer 3 times a day   -- For inhalation only.  It is very important that you take or use this exactly as directed.  Do not skip doses or discontinue unless directed by your doctor.  Obtain medical advice before taking any non-prescription drugs as some may affect the action of this medication.    -- Indication: For Acute and chronic respiratory failure with hypercapnia    azithromycin 250 mg oral tablet  -- 1 tab(s) by mouth once a day  -- Indication: For Acute and chronic respiratory failure with hypercapnia    pantoprazole 40 mg oral delayed release tablet  -- 1 tab(s) by mouth once a day (before a meal)  -- Indication: For gerd

## 2018-10-12 NOTE — DISCHARGE NOTE ADULT - PATIENT PORTAL LINK FT
You can access the [facility] Patient Portal, offered by [facility], by registering with the following website: [email or web address]

## 2018-10-13 DIAGNOSIS — J20.8 ACUTE BRONCHITIS DUE TO OTHER SPECIFIED ORGANISMS: ICD-10-CM

## 2018-10-13 DIAGNOSIS — J96.22 ACUTE AND CHRONIC RESPIRATORY FAILURE WITH HYPERCAPNIA: ICD-10-CM

## 2018-10-13 LAB
ANION GAP SERPL CALC-SCNC: 4 MMOL/L — LOW (ref 5–17)
BUN SERPL-MCNC: 26 MG/DL — HIGH (ref 7–23)
CALCIUM SERPL-MCNC: 8.3 MG/DL — LOW (ref 8.5–10.1)
CHLORIDE SERPL-SCNC: 98 MMOL/L — SIGNIFICANT CHANGE UP (ref 96–108)
CO2 SERPL-SCNC: 42 MMOL/L — HIGH (ref 22–31)
CREAT SERPL-MCNC: 0.51 MG/DL — SIGNIFICANT CHANGE UP (ref 0.5–1.3)
CULTURE RESULTS: SIGNIFICANT CHANGE UP
CULTURE RESULTS: SIGNIFICANT CHANGE UP
GLUCOSE SERPL-MCNC: 84 MG/DL — SIGNIFICANT CHANGE UP (ref 70–99)
HCT VFR BLD CALC: 37.3 % — SIGNIFICANT CHANGE UP (ref 34.5–45)
HGB BLD-MCNC: 11 G/DL — LOW (ref 11.5–15.5)
MCHC RBC-ENTMCNC: 27.2 PG — SIGNIFICANT CHANGE UP (ref 27–34)
MCHC RBC-ENTMCNC: 29.5 GM/DL — LOW (ref 32–36)
MCV RBC AUTO: 92.3 FL — SIGNIFICANT CHANGE UP (ref 80–100)
NRBC # BLD: 0 /100 WBCS — SIGNIFICANT CHANGE UP (ref 0–0)
PLATELET # BLD AUTO: 257 K/UL — SIGNIFICANT CHANGE UP (ref 150–400)
POTASSIUM SERPL-MCNC: 4.2 MMOL/L — SIGNIFICANT CHANGE UP (ref 3.5–5.3)
POTASSIUM SERPL-SCNC: 4.2 MMOL/L — SIGNIFICANT CHANGE UP (ref 3.5–5.3)
RBC # BLD: 4.04 M/UL — SIGNIFICANT CHANGE UP (ref 3.8–5.2)
RBC # FLD: 16.7 % — HIGH (ref 10.3–14.5)
SODIUM SERPL-SCNC: 144 MMOL/L — SIGNIFICANT CHANGE UP (ref 135–145)
SPECIMEN SOURCE: SIGNIFICANT CHANGE UP
SPECIMEN SOURCE: SIGNIFICANT CHANGE UP
WBC # BLD: 13.61 K/UL — HIGH (ref 3.8–10.5)
WBC # FLD AUTO: 13.61 K/UL — HIGH (ref 3.8–10.5)

## 2018-10-13 PROCEDURE — 99233 SBSQ HOSP IP/OBS HIGH 50: CPT

## 2018-10-13 RX ADMIN — OLANZAPINE 10 MILLIGRAM(S): 15 TABLET, FILM COATED ORAL at 06:24

## 2018-10-13 RX ADMIN — Medication 40 MILLIGRAM(S): at 06:25

## 2018-10-13 RX ADMIN — Medication 3 MILLILITER(S): at 05:48

## 2018-10-13 RX ADMIN — Medication 81 MILLIGRAM(S): at 11:46

## 2018-10-13 RX ADMIN — OLANZAPINE 10 MILLIGRAM(S): 15 TABLET, FILM COATED ORAL at 17:23

## 2018-10-13 RX ADMIN — Medication 3 MILLILITER(S): at 11:01

## 2018-10-13 RX ADMIN — ARIPIPRAZOLE 10 MILLIGRAM(S): 15 TABLET ORAL at 11:45

## 2018-10-13 RX ADMIN — Medication 325 MILLIGRAM(S): at 13:45

## 2018-10-13 RX ADMIN — PANTOPRAZOLE SODIUM 40 MILLIGRAM(S): 20 TABLET, DELAYED RELEASE ORAL at 06:24

## 2018-10-13 RX ADMIN — Medication 2 MILLIGRAM(S): at 17:23

## 2018-10-13 RX ADMIN — DIVALPROEX SODIUM 500 MILLIGRAM(S): 500 TABLET, DELAYED RELEASE ORAL at 17:23

## 2018-10-13 RX ADMIN — SERTRALINE 100 MILLIGRAM(S): 25 TABLET, FILM COATED ORAL at 11:46

## 2018-10-13 RX ADMIN — Medication 3 MILLILITER(S): at 17:02

## 2018-10-13 RX ADMIN — BUDESONIDE AND FORMOTEROL FUMARATE DIHYDRATE 2 PUFF(S): 160; 4.5 AEROSOL RESPIRATORY (INHALATION) at 17:23

## 2018-10-13 RX ADMIN — BUDESONIDE AND FORMOTEROL FUMARATE DIHYDRATE 2 PUFF(S): 160; 4.5 AEROSOL RESPIRATORY (INHALATION) at 06:24

## 2018-10-13 RX ADMIN — Medication 40 MILLIGRAM(S): at 17:24

## 2018-10-13 RX ADMIN — DIVALPROEX SODIUM 500 MILLIGRAM(S): 500 TABLET, DELAYED RELEASE ORAL at 06:24

## 2018-10-13 RX ADMIN — Medication 2 MILLIGRAM(S): at 06:24

## 2018-10-13 RX ADMIN — AZITHROMYCIN 250 MILLIGRAM(S): 500 TABLET, FILM COATED ORAL at 11:46

## 2018-10-13 RX ADMIN — Medication 3 MILLILITER(S): at 23:28

## 2018-10-13 RX ADMIN — Medication 3 MILLILITER(S): at 00:55

## 2018-10-13 RX ADMIN — Medication 325 MILLIGRAM(S): at 12:45

## 2018-10-13 NOTE — PROGRESS NOTE ADULT - SUBJECTIVE AND OBJECTIVE BOX
Patient is a 57y old  Female who presents with a chief complaint of dysnea (12 Oct 2018 14:25)      OVERNIGHT EVENTS: baseline sob    REVIEW OF SYSTEMS: denies chest pain/SOB, diaphoresis, no F/C, cough, dizziness, headache, blurry vision, nausea, vomiting, abdominal pain. All others review of systems negative     MEDICATIONS  (STANDING):  ALBUTerol    90 MICROgram(s) HFA Inhaler 1 Puff(s) Inhalation every 4 hours  ALBUTerol/ipratropium for Nebulization 3 milliLiter(s) Nebulizer every 6 hours  ARIPiprazole 10 milliGRAM(s) Oral daily  aspirin enteric coated 81 milliGRAM(s) Oral daily  azithromycin   Tablet 250 milliGRAM(s) Oral daily  benztropine 2 milliGRAM(s) Oral two times a day  buDESOnide 160 MICROgram(s)/formoterol 4.5 MICROgram(s) Inhaler 2 Puff(s) Inhalation two times a day  diVALproex  milliGRAM(s) Oral two times a day  methylPREDNISolone sodium succinate Injectable 40 milliGRAM(s) IV Push every 12 hours  OLANZapine 10 milliGRAM(s) Oral two times a day  pantoprazole    Tablet 40 milliGRAM(s) Oral before breakfast  sertraline 100 milliGRAM(s) Oral daily  tiotropium 18 MICROgram(s) Capsule 1 Capsule(s) Inhalation daily    MEDICATIONS  (PRN):  acetaminophen   Tablet .. 325 milliGRAM(s) Oral every 4 hours PRN Temp greater or equal to 38C (100.4F), Mild Pain (1 - 3)      Allergies    No Known Allergies    Intolerances        T(F): 97.1 (10-14-18 @ 05:46), Max: 98.6 (10-13-18 @ 12:44)  HR: 68 (10-14-18 @ 07:36) (65 - 93)  BP: 113/66 (10-14-18 @ 05:46) (95/56 - 113/66)  RR: 18 (10-14-18 @ 05:46) (17 - 18)  SpO2: 95% (10-14-18 @ 07:36) (92% - 99%)  Wt(kg): --    PHYSICAL EXAM:  GENERAL: delirious, toxic looking, well-groomed, well-developed  HEAD:  Atraumatic, Normocephalic  EYES: EOMI, PERRLA, conjunctiva and sclera clear  ENMT: No tonsillar erythema, exudates, or enlargement; Moist mucous membranes, Good dentition, No lesions  NECK: Supple, No JVD, Normal thyroid  NERVOUS SYSTEM:  Alert & Oriented X2, Motor Strength 4/5 B/L upper and lower extremities; DTRs 2+ intact and symmetric  CHEST/LUNG: decreased bilaterally; wheezing and rhonchi BL  HEART: Regular rate and rhythm; No murmurs, rubs, or gallops  ABDOMEN: Soft, Nontender, Nondistended; Bowel sounds present  EXTREMITIES:  2+ Peripheral Pulses, No clubbing, cyanosis, or edema BL LE  LYMPH: No lymphadenopathy noted  SKIN: No rashes or lesions    LABS:                        12.0   15.17 )-----------( 279      ( 14 Oct 2018 05:41 )             40.1     10-14    141  |  98  |  23  ----------------------------<  113<H>  3.9   |  37<H>  |  0.64    Ca    8.6      14 Oct 2018 05:41          Cultures;   CAPILLARY BLOOD GLUCOSE        RADIOLOGY & ADDITIONAL TESTS:    Imaging Personally Reviewed:  [x ] YES    < from: Xray Chest 1 View- PORTABLE-Urgent (10.08.18 @ 11:12) >  Chronic post-inflammatory right hemithorax changes - are stable.  Left lung is clear.      Consultant(s) Notes Reviewed:  [x ] YES     Care Discussed with [ x] Consultants [X ] Patient [x ] Family; brother Dr. Clark [phone]  [x ]    [x ]  Other; RN

## 2018-10-14 LAB
ANION GAP SERPL CALC-SCNC: 6 MMOL/L — SIGNIFICANT CHANGE UP (ref 5–17)
BUN SERPL-MCNC: 23 MG/DL — SIGNIFICANT CHANGE UP (ref 7–23)
CALCIUM SERPL-MCNC: 8.6 MG/DL — SIGNIFICANT CHANGE UP (ref 8.5–10.1)
CHLORIDE SERPL-SCNC: 98 MMOL/L — SIGNIFICANT CHANGE UP (ref 96–108)
CO2 SERPL-SCNC: 37 MMOL/L — HIGH (ref 22–31)
CREAT SERPL-MCNC: 0.64 MG/DL — SIGNIFICANT CHANGE UP (ref 0.5–1.3)
GLUCOSE SERPL-MCNC: 113 MG/DL — HIGH (ref 70–99)
HCT VFR BLD CALC: 40.1 % — SIGNIFICANT CHANGE UP (ref 34.5–45)
HGB BLD-MCNC: 12 G/DL — SIGNIFICANT CHANGE UP (ref 11.5–15.5)
MCHC RBC-ENTMCNC: 27.1 PG — SIGNIFICANT CHANGE UP (ref 27–34)
MCHC RBC-ENTMCNC: 29.9 GM/DL — LOW (ref 32–36)
MCV RBC AUTO: 90.7 FL — SIGNIFICANT CHANGE UP (ref 80–100)
NRBC # BLD: 0 /100 WBCS — SIGNIFICANT CHANGE UP (ref 0–0)
PLATELET # BLD AUTO: 279 K/UL — SIGNIFICANT CHANGE UP (ref 150–400)
POTASSIUM SERPL-MCNC: 3.9 MMOL/L — SIGNIFICANT CHANGE UP (ref 3.5–5.3)
POTASSIUM SERPL-SCNC: 3.9 MMOL/L — SIGNIFICANT CHANGE UP (ref 3.5–5.3)
RBC # BLD: 4.42 M/UL — SIGNIFICANT CHANGE UP (ref 3.8–5.2)
RBC # FLD: 16.3 % — HIGH (ref 10.3–14.5)
SODIUM SERPL-SCNC: 141 MMOL/L — SIGNIFICANT CHANGE UP (ref 135–145)
WBC # BLD: 15.17 K/UL — HIGH (ref 3.8–10.5)
WBC # FLD AUTO: 15.17 K/UL — HIGH (ref 3.8–10.5)

## 2018-10-14 PROCEDURE — 99233 SBSQ HOSP IP/OBS HIGH 50: CPT

## 2018-10-14 RX ADMIN — SERTRALINE 100 MILLIGRAM(S): 25 TABLET, FILM COATED ORAL at 11:47

## 2018-10-14 RX ADMIN — Medication 3 MILLILITER(S): at 17:18

## 2018-10-14 RX ADMIN — DIVALPROEX SODIUM 500 MILLIGRAM(S): 500 TABLET, DELAYED RELEASE ORAL at 17:56

## 2018-10-14 RX ADMIN — Medication 81 MILLIGRAM(S): at 11:47

## 2018-10-14 RX ADMIN — BUDESONIDE AND FORMOTEROL FUMARATE DIHYDRATE 2 PUFF(S): 160; 4.5 AEROSOL RESPIRATORY (INHALATION) at 17:55

## 2018-10-14 RX ADMIN — DIVALPROEX SODIUM 500 MILLIGRAM(S): 500 TABLET, DELAYED RELEASE ORAL at 06:43

## 2018-10-14 RX ADMIN — Medication 40 MILLIGRAM(S): at 06:43

## 2018-10-14 RX ADMIN — OLANZAPINE 10 MILLIGRAM(S): 15 TABLET, FILM COATED ORAL at 06:43

## 2018-10-14 RX ADMIN — BUDESONIDE AND FORMOTEROL FUMARATE DIHYDRATE 2 PUFF(S): 160; 4.5 AEROSOL RESPIRATORY (INHALATION) at 06:43

## 2018-10-14 RX ADMIN — Medication 40 MILLIGRAM(S): at 17:56

## 2018-10-14 RX ADMIN — Medication 3 MILLILITER(S): at 11:36

## 2018-10-14 RX ADMIN — Medication 2 MILLIGRAM(S): at 17:55

## 2018-10-14 RX ADMIN — Medication 3 MILLILITER(S): at 05:36

## 2018-10-14 RX ADMIN — ARIPIPRAZOLE 10 MILLIGRAM(S): 15 TABLET ORAL at 11:48

## 2018-10-14 RX ADMIN — Medication 3 MILLILITER(S): at 23:37

## 2018-10-14 RX ADMIN — OLANZAPINE 10 MILLIGRAM(S): 15 TABLET, FILM COATED ORAL at 17:56

## 2018-10-14 RX ADMIN — PANTOPRAZOLE SODIUM 40 MILLIGRAM(S): 20 TABLET, DELAYED RELEASE ORAL at 06:43

## 2018-10-14 RX ADMIN — AZITHROMYCIN 250 MILLIGRAM(S): 500 TABLET, FILM COATED ORAL at 11:48

## 2018-10-14 RX ADMIN — Medication 2 MILLIGRAM(S): at 06:43

## 2018-10-14 NOTE — PROGRESS NOTE ADULT - ASSESSMENT
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 Admitted with acute on chronic hypercapnic respiratory failure, PNA and elevated troponin. RVP positive for RSV. Patient  still awaiting  Trilogy  approval  from [facility].

## 2018-10-14 NOTE — PROGRESS NOTE ADULT - SUBJECTIVE AND OBJECTIVE BOX
Patient is a 57y old  Female who presents with a chief complaint of sob (13 Oct 2018 10:10)      OVERNIGHT EVENTS: none     REVIEW OF SYSTEMS: denies chest pain/SOB, diaphoresis, no F/C, cough, dizziness, headache, blurry vision, nausea, vomiting, abdominal pain. All others review of systems negative     MEDICATIONS  (STANDING):  ALBUTerol    90 MICROgram(s) HFA Inhaler 1 Puff(s) Inhalation every 4 hours  ALBUTerol/ipratropium for Nebulization 3 milliLiter(s) Nebulizer every 6 hours  ARIPiprazole 10 milliGRAM(s) Oral daily  aspirin enteric coated 81 milliGRAM(s) Oral daily  azithromycin   Tablet 250 milliGRAM(s) Oral daily  benztropine 2 milliGRAM(s) Oral two times a day  buDESOnide 160 MICROgram(s)/formoterol 4.5 MICROgram(s) Inhaler 2 Puff(s) Inhalation two times a day  diVALproex  milliGRAM(s) Oral two times a day  methylPREDNISolone sodium succinate Injectable 40 milliGRAM(s) IV Push every 12 hours  OLANZapine 10 milliGRAM(s) Oral two times a day  pantoprazole    Tablet 40 milliGRAM(s) Oral before breakfast  sertraline 100 milliGRAM(s) Oral daily  tiotropium 18 MICROgram(s) Capsule 1 Capsule(s) Inhalation daily    MEDICATIONS  (PRN):  acetaminophen   Tablet .. 325 milliGRAM(s) Oral every 4 hours PRN Temp greater or equal to 38C (100.4F), Mild Pain (1 - 3)      Allergies    No Known Allergies    Intolerances        T(F): 97.1 (10-14-18 @ 05:46), Max: 98.6 (10-13-18 @ 12:44)  HR: 68 (10-14-18 @ 07:36) (65 - 93)  BP: 113/66 (10-14-18 @ 05:46) (95/56 - 113/66)  RR: 18 (10-14-18 @ 05:46) (17 - 18)  SpO2: 95% (10-14-18 @ 07:36) (92% - 99%)  Wt(kg): --    PHYSICAL EXAM:  GENERAL: delirious, toxic looking, well-groomed, well-developed  HEAD:  Atraumatic, Normocephalic  EYES: EOMI, PERRLA, conjunctiva and sclera clear  ENMT: No tonsillar erythema, exudates, or enlargement; Moist mucous membranes, Good dentition, No lesions  NECK: Supple, No JVD, Normal thyroid  NERVOUS SYSTEM:  Alert & Oriented X2, Motor Strength 4/5 B/L upper and lower extremities; DTRs 2+ intact and symmetric  CHEST/LUNG: decreased bilaterally; rhonchi BL  HEART: Regular rate and rhythm; No murmurs, rubs, or gallops  ABDOMEN: Soft, Nontender, Nondistended; Bowel sounds present  EXTREMITIES:  2+ Peripheral Pulses, No clubbing, cyanosis, or edema BL LE  LYMPH: No lymphadenopathy noted  SKIN: No rashes or lesions    LABS:                        12.0   15.17 )-----------( 279      ( 14 Oct 2018 05:41 )             40.1     10-14    141  |  98  |  23  ----------------------------<  113<H>  3.9   |  37<H>  |  0.64    Ca    8.6      14 Oct 2018 05:41          Cultures;   CAPILLARY BLOOD GLUCOSE        RADIOLOGY & ADDITIONAL TESTS:    Imaging Personally Reviewed:  [x ] YES    < from: Xray Chest 1 View- PORTABLE-Urgent (10.08.18 @ 11:12) >  Chronic post-inflammatory right hemithorax changes - are stable.  Left lung is clear.      Consultant(s) Notes Reviewed:  [x ] YES     Care Discussed with [ x] Consultants [X ] Patient [x ] Family; brother Dr. Clark [phone]  [x ]    [x ]  Other; RN

## 2018-10-15 VITALS
RESPIRATION RATE: 17 BRPM | DIASTOLIC BLOOD PRESSURE: 60 MMHG | OXYGEN SATURATION: 97 % | TEMPERATURE: 97 F | SYSTOLIC BLOOD PRESSURE: 104 MMHG | HEART RATE: 96 BPM

## 2018-10-15 LAB
ANION GAP SERPL CALC-SCNC: 8 MMOL/L — SIGNIFICANT CHANGE UP (ref 5–17)
BUN SERPL-MCNC: 21 MG/DL — SIGNIFICANT CHANGE UP (ref 7–23)
CALCIUM SERPL-MCNC: 8.8 MG/DL — SIGNIFICANT CHANGE UP (ref 8.5–10.1)
CHLORIDE SERPL-SCNC: 99 MMOL/L — SIGNIFICANT CHANGE UP (ref 96–108)
CO2 SERPL-SCNC: 35 MMOL/L — HIGH (ref 22–31)
CREAT SERPL-MCNC: 0.5 MG/DL — SIGNIFICANT CHANGE UP (ref 0.5–1.3)
GLUCOSE SERPL-MCNC: 100 MG/DL — HIGH (ref 70–99)
HCT VFR BLD CALC: 40.3 % — SIGNIFICANT CHANGE UP (ref 34.5–45)
HGB BLD-MCNC: 11.8 G/DL — SIGNIFICANT CHANGE UP (ref 11.5–15.5)
MCHC RBC-ENTMCNC: 27.1 PG — SIGNIFICANT CHANGE UP (ref 27–34)
MCHC RBC-ENTMCNC: 29.3 GM/DL — LOW (ref 32–36)
MCV RBC AUTO: 92.4 FL — SIGNIFICANT CHANGE UP (ref 80–100)
NRBC # BLD: 0 /100 WBCS — SIGNIFICANT CHANGE UP (ref 0–0)
PLATELET # BLD AUTO: 254 K/UL — SIGNIFICANT CHANGE UP (ref 150–400)
POTASSIUM SERPL-MCNC: 4.6 MMOL/L — SIGNIFICANT CHANGE UP (ref 3.5–5.3)
POTASSIUM SERPL-SCNC: 4.6 MMOL/L — SIGNIFICANT CHANGE UP (ref 3.5–5.3)
RBC # BLD: 4.36 M/UL — SIGNIFICANT CHANGE UP (ref 3.8–5.2)
RBC # FLD: 16.2 % — HIGH (ref 10.3–14.5)
SODIUM SERPL-SCNC: 142 MMOL/L — SIGNIFICANT CHANGE UP (ref 135–145)
WBC # BLD: 11.22 K/UL — HIGH (ref 3.8–10.5)
WBC # FLD AUTO: 11.22 K/UL — HIGH (ref 3.8–10.5)

## 2018-10-15 PROCEDURE — 99239 HOSP IP/OBS DSCHRG MGMT >30: CPT

## 2018-10-15 PROCEDURE — 99232 SBSQ HOSP IP/OBS MODERATE 35: CPT

## 2018-10-15 RX ADMIN — PANTOPRAZOLE SODIUM 40 MILLIGRAM(S): 20 TABLET, DELAYED RELEASE ORAL at 06:39

## 2018-10-15 RX ADMIN — Medication 40 MILLIGRAM(S): at 05:23

## 2018-10-15 RX ADMIN — OLANZAPINE 10 MILLIGRAM(S): 15 TABLET, FILM COATED ORAL at 05:23

## 2018-10-15 RX ADMIN — ARIPIPRAZOLE 10 MILLIGRAM(S): 15 TABLET ORAL at 11:21

## 2018-10-15 RX ADMIN — BUDESONIDE AND FORMOTEROL FUMARATE DIHYDRATE 2 PUFF(S): 160; 4.5 AEROSOL RESPIRATORY (INHALATION) at 06:39

## 2018-10-15 RX ADMIN — Medication 2 MILLIGRAM(S): at 05:23

## 2018-10-15 RX ADMIN — SERTRALINE 100 MILLIGRAM(S): 25 TABLET, FILM COATED ORAL at 11:21

## 2018-10-15 RX ADMIN — Medication 81 MILLIGRAM(S): at 11:21

## 2018-10-15 RX ADMIN — DIVALPROEX SODIUM 500 MILLIGRAM(S): 500 TABLET, DELAYED RELEASE ORAL at 05:23

## 2018-10-15 RX ADMIN — Medication 3 MILLILITER(S): at 11:00

## 2018-10-15 RX ADMIN — Medication 3 MILLILITER(S): at 05:30

## 2018-10-15 RX ADMIN — AZITHROMYCIN 250 MILLIGRAM(S): 500 TABLET, FILM COATED ORAL at 11:21

## 2018-10-15 NOTE — PROGRESS NOTE ADULT - PROBLEM SELECTOR PLAN 1
on antibiotics prophylactically as pt has a decompensated lung sec to extensive bronchiectasis(on home oxygen) due to previous TB infection   procalcitonin neg   RVP + for viral infection  d/c azithro today

## 2018-10-15 NOTE — DIETITIAN INITIAL EVALUATION ADULT. - PERTINENT MEDS FT
MEDICATIONS  (STANDING):  ALBUTerol    90 MICROgram(s) HFA Inhaler 1 Puff(s) Inhalation every 4 hours  ALBUTerol/ipratropium for Nebulization 3 milliLiter(s) Nebulizer every 6 hours  ARIPiprazole 10 milliGRAM(s) Oral daily  aspirin enteric coated 81 milliGRAM(s) Oral daily  azithromycin   Tablet 250 milliGRAM(s) Oral daily  benztropine 2 milliGRAM(s) Oral two times a day  buDESOnide 160 MICROgram(s)/formoterol 4.5 MICROgram(s) Inhaler 2 Puff(s) Inhalation two times a day  diVALproex  milliGRAM(s) Oral two times a day  methylPREDNISolone sodium succinate Injectable 40 milliGRAM(s) IV Push every 12 hours  OLANZapine 10 milliGRAM(s) Oral two times a day  pantoprazole    Tablet 40 milliGRAM(s) Oral before breakfast  sertraline 100 milliGRAM(s) Oral daily  tiotropium 18 MICROgram(s) Capsule 1 Capsule(s) Inhalation daily    MEDICATIONS  (PRN):  acetaminophen   Tablet .. 325 milliGRAM(s) Oral every 4 hours PRN Temp greater or equal to 38C (100.4F), Mild Pain (1 - 3)

## 2018-10-15 NOTE — DIETITIAN INITIAL EVALUATION ADULT. - PERTINENT LABORATORY DATA
10-15 Na142 mmol/L Glu 100 mg/dL<H> K+ 4.6 mmol/L Cr  0.50 mg/dL BUN 21 mg/dL WBC 11.22 K/uL<H> 10-10 Phos 3.2 mg/dL 10-10 Alb 2.6 g/dL<L>

## 2018-10-15 NOTE — PROGRESS NOTE ADULT - SUBJECTIVE AND OBJECTIVE BOX
Patient is a 57y old  Female who presents with a chief complaint of sob (13 Oct 2018 10:10)      INTERVAL HPI / OVERNIGHT EVENTS: breathing slightly better ,cough as well no fever   on NC now    MEDICATIONS  (STANDING):  ALBUTerol    90 MICROgram(s) HFA Inhaler 1 Puff(s) Inhalation every 4 hours  ALBUTerol/ipratropium for Nebulization 3 milliLiter(s) Nebulizer every 6 hours  ARIPiprazole 10 milliGRAM(s) Oral daily  aspirin enteric coated 81 milliGRAM(s) Oral daily  azithromycin   Tablet 250 milliGRAM(s) Oral daily  benztropine 2 milliGRAM(s) Oral two times a day  buDESOnide 160 MICROgram(s)/formoterol 4.5 MICROgram(s) Inhaler 2 Puff(s) Inhalation two times a day  diVALproex  milliGRAM(s) Oral two times a day  methylPREDNISolone sodium succinate Injectable 40 milliGRAM(s) IV Push every 12 hours  OLANZapine 10 milliGRAM(s) Oral two times a day  pantoprazole    Tablet 40 milliGRAM(s) Oral before breakfast  sertraline 100 milliGRAM(s) Oral daily  tiotropium 18 MICROgram(s) Capsule 1 Capsule(s) Inhalation daily    MEDICATIONS  (PRN):  acetaminophen   Tablet .. 325 milliGRAM(s) Oral every 4 hours PRN Temp greater or equal to 38C (100.4F), Mild Pain (1 - 3)      Vital Signs Last 24 Hrs  T(C): 36.1 (15 Oct 2018 14:57), Max: 36.3 (15 Oct 2018 12:25)  T(F): 97 (15 Oct 2018 14:57), Max: 97.4 (15 Oct 2018 12:25)  HR: 96 (15 Oct 2018 14:57) (59 - 96)  BP: 104/60 (15 Oct 2018 14:57) (102/63 - 125/90)  BP(mean): --  RR: 17 (15 Oct 2018 14:57) (16 - 17)  SpO2: 97% (15 Oct 2018 14:57) (93% - 100%)    Review of systems:          PHYSICAL EXAM:  General :NAD  Constitutional:  well-groomed, well-developed  Respiratory: crackles +  Cardiovascular: S1 and S2, RRR, no M/G/R  Gastrointestinal: BS+, soft, NT/ND  Extremities: No peripheral edema  Vascular: 2+ peripheral pulses  Skin: No rashes      LABS:                        11.8   11.22 )-----------( 254      ( 15 Oct 2018 06:27 )             40.3     10-15    142  |  99  |  21  ----------------------------<  100<H>  4.6   |  35<H>  |  0.50    Ca    8.8      15 Oct 2018 06:27            MICROBIOLOGY:  RECENT CULTURES:  10-09 .Urine Clean Catch (Midstream) XXXX XXXX   No growth    10-08 .Blood Blood-Peripheral XXXX XXXX   No growth at 5 days.          RADIOLOGY & ADDITIONAL STUDIES:

## 2018-10-15 NOTE — DIETITIAN INITIAL EVALUATION ADULT. - FACTORS AFF FOOD INTAKE
change in mental status/Oriental orthodox/ethnic/cultural/personal food preferences/shortness of breath, cough

## 2018-10-15 NOTE — PROGRESS NOTE ADULT - PROBLEM SELECTOR PLAN 5
continue with aripiprazole, divalproex, benztropine
resolved
continue with aripiprazole, divalproex, benztropine

## 2018-10-15 NOTE — PROGRESS NOTE ADULT - PROBLEM SELECTOR PROBLEM 5
Respiratory insufficiency/failure
Schizophrenia, unspecified type

## 2018-10-15 NOTE — DIETITIAN INITIAL EVALUATION ADULT. - OTHER INFO
Pt seen for LOS. Pt reported her appetite is good, po intake %. Pt reported she eats regular diet at home, prefers vegetables, rice, some meats (halal), roti (bread), salad. Pt denied difficulty chewing/swallowing, denied food allergies, denied n/v/d/c. Pt seen for LOS. Pt reported her appetite is good, po intake %. Pt reported she eats regular diet at home, prefers vegetables, rice, some meats (halal), roti (bread), salad. Pt denied difficulty chewing/swallowing, but reported she is doing well on current diet consistency (mechanical soft) with no complaints. Pt denied food allergies, denied n/v/d/c.

## 2018-10-16 RX ORDER — BUDESONIDE AND FORMOTEROL FUMARATE DIHYDRATE 160; 4.5 UG/1; UG/1
1 AEROSOL RESPIRATORY (INHALATION)
Qty: 1 | Refills: 3
Start: 2018-10-16 | End: 2019-02-12

## 2018-10-16 RX ORDER — AZITHROMYCIN 500 MG/1
1 TABLET, FILM COATED ORAL
Qty: 4 | Refills: 0
Start: 2018-10-16 | End: 2018-10-19

## 2018-10-16 RX ORDER — ALBUTEROL 90 UG/1
1 AEROSOL, METERED ORAL
Qty: 1 | Refills: 3
Start: 2018-10-16 | End: 2019-02-12

## 2018-10-16 RX ORDER — TIOTROPIUM BROMIDE 18 UG/1
1 CAPSULE ORAL; RESPIRATORY (INHALATION)
Qty: 30 | Refills: 3
Start: 2018-10-16 | End: 2019-02-12

## 2018-10-16 RX ORDER — EPINEPHRINE 0.3 MG/.3ML
3 INJECTION INTRAMUSCULAR; SUBCUTANEOUS
Qty: 1 | Refills: 1
Start: 2018-10-16 | End: 2018-12-14

## 2018-10-16 RX ORDER — PANTOPRAZOLE SODIUM 20 MG/1
1 TABLET, DELAYED RELEASE ORAL
Qty: 14 | Refills: 0
Start: 2018-10-16 | End: 2018-10-29

## 2018-10-17 ENCOUNTER — EMERGENCY (EMERGENCY)
Facility: HOSPITAL | Age: 58
LOS: 0 days | Discharge: HOME | End: 2018-10-17
Attending: EMERGENCY MEDICINE
Payer: COMMERCIAL

## 2018-10-17 VITALS
SYSTOLIC BLOOD PRESSURE: 108 MMHG | DIASTOLIC BLOOD PRESSURE: 76 MMHG | WEIGHT: 149.91 LBS | HEART RATE: 101 BPM | OXYGEN SATURATION: 94 % | HEIGHT: 62 IN | RESPIRATION RATE: 20 BRPM | TEMPERATURE: 99 F

## 2018-10-17 VITALS
RESPIRATION RATE: 19 BRPM | SYSTOLIC BLOOD PRESSURE: 104 MMHG | OXYGEN SATURATION: 92 % | HEART RATE: 100 BPM | TEMPERATURE: 97 F | DIASTOLIC BLOOD PRESSURE: 61 MMHG

## 2018-10-17 DIAGNOSIS — J44.9 CHRONIC OBSTRUCTIVE PULMONARY DISEASE, UNSPECIFIED: ICD-10-CM

## 2018-10-17 DIAGNOSIS — Z79.899 OTHER LONG TERM (CURRENT) DRUG THERAPY: ICD-10-CM

## 2018-10-17 DIAGNOSIS — Z99.81 DEPENDENCE ON SUPPLEMENTAL OXYGEN: ICD-10-CM

## 2018-10-17 DIAGNOSIS — J45.901 UNSPECIFIED ASTHMA WITH (ACUTE) EXACERBATION: ICD-10-CM

## 2018-10-17 DIAGNOSIS — R06.02 SHORTNESS OF BREATH: ICD-10-CM

## 2018-10-17 DIAGNOSIS — Z79.82 LONG TERM (CURRENT) USE OF ASPIRIN: ICD-10-CM

## 2018-10-17 LAB
ALBUMIN SERPL ELPH-MCNC: 2.9 G/DL — LOW (ref 3.3–5)
ALP SERPL-CCNC: 76 U/L — SIGNIFICANT CHANGE UP (ref 40–120)
ALT FLD-CCNC: 20 U/L — SIGNIFICANT CHANGE UP (ref 12–78)
ANION GAP SERPL CALC-SCNC: 8 MMOL/L — SIGNIFICANT CHANGE UP (ref 5–17)
APTT BLD: 30.2 SEC — SIGNIFICANT CHANGE UP (ref 27.5–37.4)
AST SERPL-CCNC: 10 U/L — LOW (ref 15–37)
BASE EXCESS BLDA CALC-SCNC: 8.4 MMOL/L — HIGH (ref -2–2)
BILIRUB SERPL-MCNC: 0.5 MG/DL — SIGNIFICANT CHANGE UP (ref 0.2–1.2)
BLOOD GAS COMMENTS: SIGNIFICANT CHANGE UP
BLOOD GAS COMMENTS: SIGNIFICANT CHANGE UP
BLOOD GAS SOURCE: SIGNIFICANT CHANGE UP
BUN SERPL-MCNC: 24 MG/DL — HIGH (ref 7–23)
CALCIUM SERPL-MCNC: 8.2 MG/DL — LOW (ref 8.5–10.1)
CHLORIDE SERPL-SCNC: 100 MMOL/L — SIGNIFICANT CHANGE UP (ref 96–108)
CO2 SERPL-SCNC: 32 MMOL/L — HIGH (ref 22–31)
CREAT SERPL-MCNC: 0.71 MG/DL — SIGNIFICANT CHANGE UP (ref 0.5–1.3)
FLUAV SPEC QL CULT: NEGATIVE — SIGNIFICANT CHANGE UP
FLUBV AG SPEC QL IA: NEGATIVE — SIGNIFICANT CHANGE UP
GLUCOSE SERPL-MCNC: 158 MG/DL — HIGH (ref 70–99)
HCO3 BLDA-SCNC: 33 MMOL/L — HIGH (ref 21–29)
HCT VFR BLD CALC: 40.4 % — SIGNIFICANT CHANGE UP (ref 34.5–45)
HGB BLD-MCNC: 12.2 G/DL — SIGNIFICANT CHANGE UP (ref 11.5–15.5)
HOROWITZ INDEX BLDA+IHG-RTO: 21 — SIGNIFICANT CHANGE UP
INR BLD: 1.07 RATIO — SIGNIFICANT CHANGE UP (ref 0.88–1.16)
LACTATE SERPL-SCNC: 1.2 MMOL/L — SIGNIFICANT CHANGE UP (ref 0.7–2)
MCHC RBC-ENTMCNC: 27.3 PG — SIGNIFICANT CHANGE UP (ref 27–34)
MCHC RBC-ENTMCNC: 30.2 GM/DL — LOW (ref 32–36)
MCV RBC AUTO: 90.4 FL — SIGNIFICANT CHANGE UP (ref 80–100)
NRBC # BLD: 0 /100 WBCS — SIGNIFICANT CHANGE UP (ref 0–0)
NT-PROBNP SERPL-SCNC: 22 PG/ML — SIGNIFICANT CHANGE UP (ref 0–125)
PCO2 BLDA: 48 MMHG — HIGH (ref 32–46)
PH BLD: 7.46 — HIGH (ref 7.35–7.45)
PLATELET # BLD AUTO: 253 K/UL — SIGNIFICANT CHANGE UP (ref 150–400)
PO2 BLDA: 57 MMHG — LOW (ref 74–108)
POTASSIUM SERPL-MCNC: 4.2 MMOL/L — SIGNIFICANT CHANGE UP (ref 3.5–5.3)
POTASSIUM SERPL-SCNC: 4.2 MMOL/L — SIGNIFICANT CHANGE UP (ref 3.5–5.3)
PROT SERPL-MCNC: 7.8 GM/DL — SIGNIFICANT CHANGE UP (ref 6–8.3)
PROTHROM AB SERPL-ACNC: 11.7 SEC — SIGNIFICANT CHANGE UP (ref 9.8–12.7)
RBC # BLD: 4.47 M/UL — SIGNIFICANT CHANGE UP (ref 3.8–5.2)
RBC # FLD: 16.4 % — HIGH (ref 10.3–14.5)
SAO2 % BLDA: 88 % — LOW (ref 92–96)
SODIUM SERPL-SCNC: 140 MMOL/L — SIGNIFICANT CHANGE UP (ref 135–145)
TROPONIN I SERPL-MCNC: <.015 NG/ML — SIGNIFICANT CHANGE UP (ref 0.01–0.04)
WBC # BLD: 14.56 K/UL — HIGH (ref 3.8–10.5)
WBC # FLD AUTO: 14.56 K/UL — HIGH (ref 3.8–10.5)

## 2018-10-17 PROCEDURE — 99285 EMERGENCY DEPT VISIT HI MDM: CPT

## 2018-10-17 PROCEDURE — 71045 X-RAY EXAM CHEST 1 VIEW: CPT | Mod: 26

## 2018-10-17 RX ORDER — IPRATROPIUM/ALBUTEROL SULFATE 18-103MCG
3 AEROSOL WITH ADAPTER (GRAM) INHALATION
Qty: 0 | Refills: 0 | Status: COMPLETED | OUTPATIENT
Start: 2018-10-17 | End: 2018-10-17

## 2018-10-17 RX ORDER — ALBUTEROL 90 UG/1
1 AEROSOL, METERED ORAL ONCE
Qty: 0 | Refills: 0 | Status: COMPLETED | OUTPATIENT
Start: 2018-10-17 | End: 2018-10-17

## 2018-10-17 RX ORDER — SODIUM CHLORIDE 9 MG/ML
500 INJECTION INTRAMUSCULAR; INTRAVENOUS; SUBCUTANEOUS ONCE
Qty: 0 | Refills: 0 | Status: COMPLETED | OUTPATIENT
Start: 2018-10-17 | End: 2018-10-17

## 2018-10-17 RX ORDER — TIOTROPIUM BROMIDE 18 UG/1
1 CAPSULE ORAL; RESPIRATORY (INHALATION) ONCE
Qty: 0 | Refills: 0 | Status: COMPLETED | OUTPATIENT
Start: 2018-10-17 | End: 2018-10-17

## 2018-10-17 RX ADMIN — SODIUM CHLORIDE 500 MILLILITER(S): 9 INJECTION INTRAMUSCULAR; INTRAVENOUS; SUBCUTANEOUS at 16:03

## 2018-10-17 RX ADMIN — ALBUTEROL 1 PUFF(S): 90 AEROSOL, METERED ORAL at 18:54

## 2018-10-17 RX ADMIN — Medication 3 MILLILITER(S): at 15:30

## 2018-10-17 RX ADMIN — TIOTROPIUM BROMIDE 1 CAPSULE(S): 18 CAPSULE ORAL; RESPIRATORY (INHALATION) at 18:55

## 2018-10-17 RX ADMIN — Medication 3 MILLILITER(S): at 15:50

## 2018-10-17 RX ADMIN — Medication 125 MILLIGRAM(S): at 16:02

## 2018-10-17 RX ADMIN — Medication 3 MILLILITER(S): at 16:21

## 2018-10-17 RX ADMIN — SODIUM CHLORIDE 500 MILLILITER(S): 9 INJECTION INTRAMUSCULAR; INTRAVENOUS; SUBCUTANEOUS at 16:46

## 2018-10-17 NOTE — ED PROVIDER NOTE - PROGRESS NOTE DETAILS
Dr. Alexis, patient's brother called adding to history (from CT)  pt. has bronchiectasis as a child from lung injury  pt. sent home, but insurance didn't cover neb treatments  today pt. went to pcp today who recommended coming to ER; pt. manages with regular sats in the 90's  Dr. Alexis thinks she's having an acute exacerbation 2/2 not having meds Results reported to patient--grossly benign, leukocytosis likely reactive to steroids, cxr unchanged  Pt. reports feeling better after meds; spoke with Brother, Dr. Alexis, agrees with discharge home  pt. agrees to f/u with primary care outpt.; will attempt to get patient spiriva and albuterol inhalers in ER to take home  pt. understands to return to ED if symptoms worsen; will d/c

## 2018-10-17 NOTE — ED ADULT NURSE NOTE - OBJECTIVE STATEMENT
received er bed 10 c/o difficulty breathing b/l wheezing/rhonchi audible denies fever/chills seen here yesterday for same c/o d/c'd home states unable to obtain rx'd meds due to insurance coverage chest discomfort upon coughing skin warm and dry able to speak freely previous hx of intubation

## 2018-10-17 NOTE — ED PROVIDER NOTE - MEDICAL DECISION MAKING DETAILS
58 yo F with pneumonia vs copd exacerbation, r/o ACS  -basic labs, type and screen, abg, blood cx, procalcitonin, lactate, viral swab, trop, ekg, cxr, ua, cx, monitor in ER, nebs, solu-medrol

## 2018-10-17 NOTE — ED PROVIDER NOTE - OBJECTIVE STATEMENT
56 yo F with sob.  Pt. was just discharged from [facility] for viral bronchiectasis and copd exacerbation  ROS: negative for fever, cough, headache, chest pain, abd pain, nausea, vomiting, diarrhea, rash, paresthesia, and weakness--all other systems reviewed are negative.   PMH: schizophrenia, autism, h/o TB and untreated for a long time, R lung collapse, bronchiectasis, ?COPD; Meds: See EMR; SH: Denies smoking/drinking/drug use 58 yo F with sob.  Pt. was just discharged from [facility] for viral bronchiectasis and copd exacerbation.  Pt. feels sob, didn't really get better since discharge.  They also mention they weren't able to  prescriptions that were prescribed to her.  No other complaints.   ROS: negative for fever, headache, chest pain, abd pain, nausea, vomiting, diarrhea, rash, paresthesia, and weakness--all other systems reviewed are negative.   PMH: schizophrenia, autism, h/o TB and untreated for a long time, R lung collapse, bronchiectasis, ?COPD; Meds: See EMR; SH: Denies smoking/drinking/drug use 56 yo F with sob.  Pt. was just discharged from [facility] for viral bronchiectasis and copd exacerbation.  Pt. feels sob, didn't really get better since discharge.  They also mention they weren't able to  prescriptions that were prescribed to her.  No other complaints.   ROS: negative for fever, headache, chest pain, abd pain, nausea, vomiting, diarrhea, rash, paresthesia, and weakness--all other systems reviewed are negative.   PMH: schizophrenia, autism, h/o TB and untreated for a long time, R lung collapse, bronchiectasis, ?COPD; Meds: See EMR; SH: Denies smoking/drinking/drug use  brother, Dr. Clark: [phone], (internal, onc)

## 2018-10-17 NOTE — ED PROVIDER NOTE - PHYSICAL EXAMINATION
Vitals: tachy at 101, otherwise WNL  Gen: AAOx3, NAD, sitting comfortably in stretcher, non-toxic  Head: ncat, perrla, eomi b/l  Neck: supple, no lymphadenopathy, no midline deviation  Heart: rrr, no m/r/g  Lungs: CTA b/l, no rales/ronchi/wheezes  Abd: soft, nontender, non-distended, no rebound or guarding  Ext: no clubbing/cyanosis/edema  Neuro: sensation and muscle strength intact b/l

## 2018-10-17 NOTE — ED ADULT TRIAGE NOTE - CHIEF COMPLAINT QUOTE
urgent care referred her to the hospital . Oxygen saturation 88% on room air r/o Pneumonia. he was recently discharge COPD difficulty breathing she went to urgent care referred her to the hospital . Oxygen saturation 88% on room air r/o Pneumonia. he was recently discharge COPD

## 2018-10-17 NOTE — ED ADULT NURSE NOTE - CHIEF COMPLAINT QUOTE
difficulty breathing she went to urgent care referred her to the hospital . Oxygen saturation 88% on room air r/o Pneumonia. he was recently discharge COPD

## 2018-10-18 DIAGNOSIS — I25.10 ATHEROSCLEROTIC HEART DISEASE OF NATIVE CORONARY ARTERY WITHOUT ANGINA PECTORIS: ICD-10-CM

## 2018-10-18 DIAGNOSIS — F84.0 AUTISTIC DISORDER: ICD-10-CM

## 2018-10-18 DIAGNOSIS — J15.6 PNEUMONIA DUE TO OTHER GRAM-NEGATIVE BACTERIA: ICD-10-CM

## 2018-10-18 DIAGNOSIS — J20.8 ACUTE BRONCHITIS DUE TO OTHER SPECIFIED ORGANISMS: ICD-10-CM

## 2018-10-18 DIAGNOSIS — J44.1 CHRONIC OBSTRUCTIVE PULMONARY DISEASE WITH (ACUTE) EXACERBATION: ICD-10-CM

## 2018-10-18 DIAGNOSIS — Z79.2 LONG TERM (CURRENT) USE OF ANTIBIOTICS: ICD-10-CM

## 2018-10-18 DIAGNOSIS — F20.9 SCHIZOPHRENIA, UNSPECIFIED: ICD-10-CM

## 2018-10-18 DIAGNOSIS — Z79.82 LONG TERM (CURRENT) USE OF ASPIRIN: ICD-10-CM

## 2018-10-18 DIAGNOSIS — R06.02 SHORTNESS OF BREATH: ICD-10-CM

## 2018-10-18 DIAGNOSIS — Z79.52 LONG TERM (CURRENT) USE OF SYSTEMIC STEROIDS: ICD-10-CM

## 2018-10-18 DIAGNOSIS — B34.1 ENTEROVIRUS INFECTION, UNSPECIFIED: ICD-10-CM

## 2018-10-18 DIAGNOSIS — Z99.81 DEPENDENCE ON SUPPLEMENTAL OXYGEN: ICD-10-CM

## 2018-10-18 DIAGNOSIS — F32.9 MAJOR DEPRESSIVE DISORDER, SINGLE EPISODE, UNSPECIFIED: ICD-10-CM

## 2018-10-18 DIAGNOSIS — J96.21 ACUTE AND CHRONIC RESPIRATORY FAILURE WITH HYPOXIA: ICD-10-CM

## 2018-10-18 DIAGNOSIS — Z28.21 IMMUNIZATION NOT CARRIED OUT BECAUSE OF PATIENT REFUSAL: ICD-10-CM

## 2018-10-18 DIAGNOSIS — J96.22 ACUTE AND CHRONIC RESPIRATORY FAILURE WITH HYPERCAPNIA: ICD-10-CM

## 2018-10-18 DIAGNOSIS — G93.41 METABOLIC ENCEPHALOPATHY: ICD-10-CM

## 2018-10-18 LAB — PROCALCITONIN SERPL-MCNC: 0.03 NG/ML — SIGNIFICANT CHANGE UP (ref 0.02–0.1)

## 2018-10-23 LAB
CULTURE RESULTS: SIGNIFICANT CHANGE UP
CULTURE RESULTS: SIGNIFICANT CHANGE UP
SPECIMEN SOURCE: SIGNIFICANT CHANGE UP
SPECIMEN SOURCE: SIGNIFICANT CHANGE UP

## 2018-12-10 ENCOUNTER — EMERGENCY (EMERGENCY)
Facility: HOSPITAL | Age: 58
LOS: 0 days | Discharge: HOME | End: 2018-12-10
Attending: STUDENT IN AN ORGANIZED HEALTH CARE EDUCATION/TRAINING PROGRAM
Payer: COMMERCIAL

## 2018-12-10 VITALS
RESPIRATION RATE: 19 BRPM | HEART RATE: 83 BPM | DIASTOLIC BLOOD PRESSURE: 57 MMHG | TEMPERATURE: 99 F | OXYGEN SATURATION: 97 % | SYSTOLIC BLOOD PRESSURE: 106 MMHG

## 2018-12-10 VITALS
HEART RATE: 79 BPM | SYSTOLIC BLOOD PRESSURE: 126 MMHG | HEIGHT: 64 IN | TEMPERATURE: 98 F | DIASTOLIC BLOOD PRESSURE: 72 MMHG | WEIGHT: 160.06 LBS | OXYGEN SATURATION: 100 %

## 2018-12-10 DIAGNOSIS — F20.9 SCHIZOPHRENIA, UNSPECIFIED: ICD-10-CM

## 2018-12-10 DIAGNOSIS — R06.02 SHORTNESS OF BREATH: ICD-10-CM

## 2018-12-10 DIAGNOSIS — J47.9 BRONCHIECTASIS, UNCOMPLICATED: ICD-10-CM

## 2018-12-10 DIAGNOSIS — F84.0 AUTISTIC DISORDER: ICD-10-CM

## 2018-12-10 DIAGNOSIS — R09.02 HYPOXEMIA: ICD-10-CM

## 2018-12-10 LAB
ALBUMIN SERPL ELPH-MCNC: 3.1 G/DL — LOW (ref 3.3–5)
ALP SERPL-CCNC: 75 U/L — SIGNIFICANT CHANGE UP (ref 40–120)
ALT FLD-CCNC: 13 U/L — SIGNIFICANT CHANGE UP (ref 12–78)
ANION GAP SERPL CALC-SCNC: 7 MMOL/L — SIGNIFICANT CHANGE UP (ref 5–17)
APTT BLD: 32.9 SEC — SIGNIFICANT CHANGE UP (ref 28.5–37)
AST SERPL-CCNC: 14 U/L — LOW (ref 15–37)
BASE EXCESS BLDA CALC-SCNC: 5.3 MMOL/L — HIGH (ref -2–2)
BASOPHILS # BLD AUTO: 0.05 K/UL — SIGNIFICANT CHANGE UP (ref 0–0.2)
BASOPHILS NFR BLD AUTO: 0.5 % — SIGNIFICANT CHANGE UP (ref 0–2)
BILIRUB SERPL-MCNC: 0.4 MG/DL — SIGNIFICANT CHANGE UP (ref 0.2–1.2)
BLOOD GAS COMMENTS: SIGNIFICANT CHANGE UP
BLOOD GAS COMMENTS: SIGNIFICANT CHANGE UP
BLOOD GAS SOURCE: SIGNIFICANT CHANGE UP
BUN SERPL-MCNC: 13 MG/DL — SIGNIFICANT CHANGE UP (ref 7–23)
CALCIUM SERPL-MCNC: 8.5 MG/DL — SIGNIFICANT CHANGE UP (ref 8.5–10.1)
CHLORIDE SERPL-SCNC: 103 MMOL/L — SIGNIFICANT CHANGE UP (ref 96–108)
CK MB BLD-MCNC: <2.2 % — SIGNIFICANT CHANGE UP (ref 0–3.5)
CK MB CFR SERPL CALC: <1 NG/ML — SIGNIFICANT CHANGE UP (ref 0.5–3.6)
CK SERPL-CCNC: 46 U/L — SIGNIFICANT CHANGE UP (ref 26–192)
CO2 SERPL-SCNC: 33 MMOL/L — HIGH (ref 22–31)
CREAT SERPL-MCNC: 0.57 MG/DL — SIGNIFICANT CHANGE UP (ref 0.5–1.3)
EOSINOPHIL # BLD AUTO: 0.12 K/UL — SIGNIFICANT CHANGE UP (ref 0–0.5)
EOSINOPHIL NFR BLD AUTO: 1.1 % — SIGNIFICANT CHANGE UP (ref 0–6)
GLUCOSE SERPL-MCNC: 80 MG/DL — SIGNIFICANT CHANGE UP (ref 70–99)
HCO3 BLDA-SCNC: 31 MMOL/L — HIGH (ref 21–29)
HCT VFR BLD CALC: 38.3 % — SIGNIFICANT CHANGE UP (ref 34.5–45)
HGB BLD-MCNC: 11.2 G/DL — LOW (ref 11.5–15.5)
HOROWITZ INDEX BLDA+IHG-RTO: 100 — SIGNIFICANT CHANGE UP
IMM GRANULOCYTES NFR BLD AUTO: 0.3 % — SIGNIFICANT CHANGE UP (ref 0–1.5)
INR BLD: 1.11 RATIO — SIGNIFICANT CHANGE UP (ref 0.88–1.16)
LYMPHOCYTES # BLD AUTO: 3.49 K/UL — HIGH (ref 1–3.3)
LYMPHOCYTES # BLD AUTO: 32 % — SIGNIFICANT CHANGE UP (ref 13–44)
MCHC RBC-ENTMCNC: 26.2 PG — LOW (ref 27–34)
MCHC RBC-ENTMCNC: 29.2 GM/DL — LOW (ref 32–36)
MCV RBC AUTO: 89.5 FL — SIGNIFICANT CHANGE UP (ref 80–100)
MONOCYTES # BLD AUTO: 0.71 K/UL — SIGNIFICANT CHANGE UP (ref 0–0.9)
MONOCYTES NFR BLD AUTO: 6.5 % — SIGNIFICANT CHANGE UP (ref 2–14)
NEUTROPHILS # BLD AUTO: 6.51 K/UL — SIGNIFICANT CHANGE UP (ref 1.8–7.4)
NEUTROPHILS NFR BLD AUTO: 59.6 % — SIGNIFICANT CHANGE UP (ref 43–77)
NRBC # BLD: 0 /100 WBCS — SIGNIFICANT CHANGE UP (ref 0–0)
NT-PROBNP SERPL-SCNC: 42 PG/ML — SIGNIFICANT CHANGE UP (ref 0–125)
PCO2 BLDA: 57 MMHG — HIGH (ref 32–46)
PH BLD: 7.36 — SIGNIFICANT CHANGE UP (ref 7.35–7.45)
PLATELET # BLD AUTO: 235 K/UL — SIGNIFICANT CHANGE UP (ref 150–400)
PO2 BLDA: 243 MMHG — HIGH (ref 74–108)
POTASSIUM SERPL-MCNC: 3.9 MMOL/L — SIGNIFICANT CHANGE UP (ref 3.5–5.3)
POTASSIUM SERPL-SCNC: 3.9 MMOL/L — SIGNIFICANT CHANGE UP (ref 3.5–5.3)
PROT SERPL-MCNC: 7.9 GM/DL — SIGNIFICANT CHANGE UP (ref 6–8.3)
PROTHROM AB SERPL-ACNC: 12.5 SEC — SIGNIFICANT CHANGE UP (ref 10–12.9)
RBC # BLD: 4.28 M/UL — SIGNIFICANT CHANGE UP (ref 3.8–5.2)
RBC # FLD: 17 % — HIGH (ref 10.3–14.5)
SAO2 % BLDA: 98 % — HIGH (ref 92–96)
SODIUM SERPL-SCNC: 143 MMOL/L — SIGNIFICANT CHANGE UP (ref 135–145)
TROPONIN I SERPL-MCNC: <.015 NG/ML — SIGNIFICANT CHANGE UP (ref 0.01–0.04)
WBC # BLD: 10.91 K/UL — HIGH (ref 3.8–10.5)
WBC # FLD AUTO: 10.91 K/UL — HIGH (ref 3.8–10.5)

## 2018-12-10 PROCEDURE — 71045 X-RAY EXAM CHEST 1 VIEW: CPT | Mod: 26

## 2018-12-10 PROCEDURE — 93010 ELECTROCARDIOGRAM REPORT: CPT

## 2018-12-10 PROCEDURE — 99285 EMERGENCY DEPT VISIT HI MDM: CPT

## 2018-12-10 RX ORDER — IPRATROPIUM/ALBUTEROL SULFATE 18-103MCG
3 AEROSOL WITH ADAPTER (GRAM) INHALATION
Qty: 0 | Refills: 0 | Status: COMPLETED | OUTPATIENT
Start: 2018-12-10 | End: 2018-12-10

## 2018-12-10 RX ADMIN — Medication 125 MILLIGRAM(S): at 20:40

## 2018-12-10 RX ADMIN — Medication 3 MILLILITER(S): at 20:20

## 2018-12-10 RX ADMIN — Medication 3 MILLILITER(S): at 20:41

## 2018-12-10 NOTE — ED PROVIDER NOTE - OBJECTIVE STATEMENT
58 year old female presents today c/o one week h/o sob, cough 58 year old female presents today c/o one week h/o sob, cough for the last one week (-) fevers or chills (-) recent travel (-) leg edema or calf pain (-) chest pain (-) palpitations  (-) sick contacts, pt was taking a shower when her symptoms started

## 2018-12-10 NOTE — ED PROVIDER NOTE - CONSTITUTIONAL, MLM
normal... Well nourished, awake, alert, oriented to person, place, time/situation, pt is able to speak in complete sentences

## 2018-12-10 NOTE — ED PROVIDER NOTE - PROGRESS NOTE DETAILS
pt's brother Dr Clark called [phone], he states that she has h/o bronchiectasis due to a previous injury as a child, h/o RSV, he states that she has had these symptoms in the past and today sounds like she had mucous plugging, she responds well with duoneb treatments, he feels that if her labs are normal and chest xray shows chronic changes, he would like her discharged, he states that she does have close follow up with her pmd dr jenelle cox and himself, she does use oxygen as needed and uses cpap at night, he would like a call back on his cell phone when she is ready for discharge and he will make arrangements to get her and her  home pt feels better and wants to go home, her brother dr lamb called who will call an uber to get them back home

## 2018-12-10 NOTE — ED ADULT TRIAGE NOTE - CHIEF COMPLAINT QUOTE
ems states, " pt has hx bronchial atelectasis, c/o difficulty breathing uses cpap at home as per brother " 1 duoneb in field given

## 2018-12-10 NOTE — ED PROVIDER NOTE - CARE PROVIDER_API CALL
Severiano Robert), Internal Medicine; Pulmonary Disease  [address] [place] [address]  Phone: [phone]  Fax: [phone]

## 2018-12-10 NOTE — ED ADULT NURSE NOTE - NSIMPLEMENTINTERV_GEN_ALL_ED
Implemented All Universal Safety Interventions:  Erin to call system. Call bell, personal items and telephone within reach. Instruct patient to call for assistance. Room bathroom lighting operational. Non-slip footwear when patient is off stretcher. Physically safe environment: no spills, clutter or unnecessary equipment. Stretcher in lowest position, wheels locked, appropriate side rails in place.

## 2019-01-18 NOTE — PROGRESS NOTE ADULT - PROBLEM SELECTOR PLAN 5
NOTIFICATION OF RETURN TO WORK / SCHOOL 
1/18/2019 Mr. Trinity Canada [address] [address] To Whom It May Concern: 
 
Trinity Canada was under the care of [facility] 01/18/2019. He will return to work on 01/28/2019 with no restrictions. If there are questions or concerns please have the patient contact our office.  
 
 
 
Sincerely, 
 
 
Felipe Paulino MD 
 DVT prophylaxsis: SQ lovenox daily

## 2019-05-12 ENCOUNTER — INPATIENT (INPATIENT)
Facility: HOSPITAL | Age: 59
LOS: 9 days | Discharge: HOME HEALTH | End: 2019-05-22
Attending: INTERNAL MEDICINE | Admitting: INTERNAL MEDICINE
Payer: COMMERCIAL

## 2019-05-12 VITALS
OXYGEN SATURATION: 99 % | HEIGHT: 62 IN | TEMPERATURE: 100 F | DIASTOLIC BLOOD PRESSURE: 70 MMHG | WEIGHT: 160.06 LBS | HEART RATE: 87 BPM | RESPIRATION RATE: 18 BRPM | SYSTOLIC BLOOD PRESSURE: 114 MMHG

## 2019-05-12 DIAGNOSIS — J44.1 CHRONIC OBSTRUCTIVE PULMONARY DISEASE WITH (ACUTE) EXACERBATION: ICD-10-CM

## 2019-05-12 DIAGNOSIS — F20.9 SCHIZOPHRENIA, UNSPECIFIED: ICD-10-CM

## 2019-05-12 DIAGNOSIS — J15.6 PNEUMONIA DUE TO OTHER GRAM-NEGATIVE BACTERIA: ICD-10-CM

## 2019-05-12 DIAGNOSIS — J96.21 ACUTE AND CHRONIC RESPIRATORY FAILURE WITH HYPOXIA: ICD-10-CM

## 2019-05-12 LAB
ALBUMIN SERPL ELPH-MCNC: 3.1 G/DL — LOW (ref 3.3–5)
ALP SERPL-CCNC: 91 U/L — SIGNIFICANT CHANGE UP (ref 40–120)
ALT FLD-CCNC: 9 U/L — LOW (ref 12–78)
ANION GAP SERPL CALC-SCNC: 7 MMOL/L — SIGNIFICANT CHANGE UP (ref 5–17)
AST SERPL-CCNC: 17 U/L — SIGNIFICANT CHANGE UP (ref 15–37)
BASOPHILS # BLD AUTO: 0.03 K/UL — SIGNIFICANT CHANGE UP (ref 0–0.2)
BASOPHILS NFR BLD AUTO: 0.3 % — SIGNIFICANT CHANGE UP (ref 0–2)
BILIRUB SERPL-MCNC: 0.6 MG/DL — SIGNIFICANT CHANGE UP (ref 0.2–1.2)
BUN SERPL-MCNC: 11 MG/DL — SIGNIFICANT CHANGE UP (ref 7–23)
CALCIUM SERPL-MCNC: 8.8 MG/DL — SIGNIFICANT CHANGE UP (ref 8.5–10.1)
CHLORIDE SERPL-SCNC: 100 MMOL/L — SIGNIFICANT CHANGE UP (ref 96–108)
CK SERPL-CCNC: 44 U/L — SIGNIFICANT CHANGE UP (ref 26–192)
CO2 SERPL-SCNC: 32 MMOL/L — HIGH (ref 22–31)
CREAT SERPL-MCNC: 0.75 MG/DL — SIGNIFICANT CHANGE UP (ref 0.5–1.3)
EOSINOPHIL # BLD AUTO: 0.11 K/UL — SIGNIFICANT CHANGE UP (ref 0–0.5)
EOSINOPHIL NFR BLD AUTO: 1 % — SIGNIFICANT CHANGE UP (ref 0–6)
FLU A RESULT: SIGNIFICANT CHANGE UP
FLU A RESULT: SIGNIFICANT CHANGE UP
FLUAV AG NPH QL: SIGNIFICANT CHANGE UP
FLUBV AG NPH QL: SIGNIFICANT CHANGE UP
GLUCOSE SERPL-MCNC: 97 MG/DL — SIGNIFICANT CHANGE UP (ref 70–99)
HCT VFR BLD CALC: 37.9 % — SIGNIFICANT CHANGE UP (ref 34.5–45)
HGB BLD-MCNC: 11.3 G/DL — LOW (ref 11.5–15.5)
IMM GRANULOCYTES NFR BLD AUTO: 0.5 % — SIGNIFICANT CHANGE UP (ref 0–1.5)
LYMPHOCYTES # BLD AUTO: 28.7 % — SIGNIFICANT CHANGE UP (ref 13–44)
LYMPHOCYTES # BLD AUTO: 3.15 K/UL — SIGNIFICANT CHANGE UP (ref 1–3.3)
MCHC RBC-ENTMCNC: 27.9 PG — SIGNIFICANT CHANGE UP (ref 27–34)
MCHC RBC-ENTMCNC: 29.8 GM/DL — LOW (ref 32–36)
MCV RBC AUTO: 93.6 FL — SIGNIFICANT CHANGE UP (ref 80–100)
MONOCYTES # BLD AUTO: 1.05 K/UL — HIGH (ref 0–0.9)
MONOCYTES NFR BLD AUTO: 9.6 % — SIGNIFICANT CHANGE UP (ref 2–14)
NEUTROPHILS # BLD AUTO: 6.58 K/UL — SIGNIFICANT CHANGE UP (ref 1.8–7.4)
NEUTROPHILS NFR BLD AUTO: 59.9 % — SIGNIFICANT CHANGE UP (ref 43–77)
NRBC # BLD: 0 /100 WBCS — SIGNIFICANT CHANGE UP (ref 0–0)
PLATELET # BLD AUTO: 209 K/UL — SIGNIFICANT CHANGE UP (ref 150–400)
POTASSIUM SERPL-MCNC: 4.8 MMOL/L — SIGNIFICANT CHANGE UP (ref 3.5–5.3)
POTASSIUM SERPL-SCNC: 4.8 MMOL/L — SIGNIFICANT CHANGE UP (ref 3.5–5.3)
PROT SERPL-MCNC: 8.3 GM/DL — SIGNIFICANT CHANGE UP (ref 6–8.3)
RAPID RVP RESULT: SIGNIFICANT CHANGE UP
RBC # BLD: 4.05 M/UL — SIGNIFICANT CHANGE UP (ref 3.8–5.2)
RBC # FLD: 18 % — HIGH (ref 10.3–14.5)
RSV RESULT: SIGNIFICANT CHANGE UP
RSV RNA RESP QL NAA+PROBE: SIGNIFICANT CHANGE UP
SODIUM SERPL-SCNC: 139 MMOL/L — SIGNIFICANT CHANGE UP (ref 135–145)
TROPONIN I SERPL-MCNC: <.015 NG/ML — SIGNIFICANT CHANGE UP (ref 0.01–0.04)
WBC # BLD: 10.97 K/UL — HIGH (ref 3.8–10.5)
WBC # FLD AUTO: 10.97 K/UL — HIGH (ref 3.8–10.5)

## 2019-05-12 PROCEDURE — 71045 X-RAY EXAM CHEST 1 VIEW: CPT | Mod: 26

## 2019-05-12 PROCEDURE — 99285 EMERGENCY DEPT VISIT HI MDM: CPT

## 2019-05-12 RX ORDER — ARIPIPRAZOLE 15 MG/1
10 TABLET ORAL DAILY
Refills: 0 | Status: DISCONTINUED | OUTPATIENT
Start: 2019-05-12 | End: 2019-05-12

## 2019-05-12 RX ORDER — IPRATROPIUM/ALBUTEROL SULFATE 18-103MCG
3 AEROSOL WITH ADAPTER (GRAM) INHALATION EVERY 6 HOURS
Refills: 0 | Status: DISCONTINUED | OUTPATIENT
Start: 2019-05-12 | End: 2019-05-13

## 2019-05-12 RX ORDER — SERTRALINE 25 MG/1
1 TABLET, FILM COATED ORAL
Qty: 0 | Refills: 0 | DISCHARGE

## 2019-05-12 RX ORDER — OLANZAPINE 15 MG/1
10 TABLET, FILM COATED ORAL
Refills: 0 | Status: DISCONTINUED | OUTPATIENT
Start: 2019-05-12 | End: 2019-05-22

## 2019-05-12 RX ORDER — AZITHROMYCIN 500 MG/1
500 TABLET, FILM COATED ORAL ONCE
Refills: 0 | Status: COMPLETED | OUTPATIENT
Start: 2019-05-12 | End: 2019-05-12

## 2019-05-12 RX ORDER — DIVALPROEX SODIUM 500 MG/1
500 TABLET, DELAYED RELEASE ORAL
Refills: 0 | Status: DISCONTINUED | OUTPATIENT
Start: 2019-05-12 | End: 2019-05-22

## 2019-05-12 RX ORDER — ARIPIPRAZOLE 15 MG/1
1 TABLET ORAL
Qty: 0 | Refills: 0 | DISCHARGE

## 2019-05-12 RX ORDER — SERTRALINE 25 MG/1
100 TABLET, FILM COATED ORAL EVERY 12 HOURS
Refills: 0 | Status: DISCONTINUED | OUTPATIENT
Start: 2019-05-12 | End: 2019-05-22

## 2019-05-12 RX ORDER — ARIPIPRAZOLE 15 MG/1
10 TABLET ORAL
Refills: 0 | Status: DISCONTINUED | OUTPATIENT
Start: 2019-05-12 | End: 2019-05-22

## 2019-05-12 RX ORDER — ASPIRIN/CALCIUM CARB/MAGNESIUM 324 MG
81 TABLET ORAL DAILY
Refills: 0 | Status: DISCONTINUED | OUTPATIENT
Start: 2019-05-12 | End: 2019-05-22

## 2019-05-12 RX ORDER — BENZTROPINE MESYLATE 1 MG
2 TABLET ORAL
Refills: 0 | Status: DISCONTINUED | OUTPATIENT
Start: 2019-05-12 | End: 2019-05-22

## 2019-05-12 RX ORDER — CEFTRIAXONE 500 MG/1
INJECTION, POWDER, FOR SOLUTION INTRAMUSCULAR; INTRAVENOUS
Refills: 0 | Status: DISCONTINUED | OUTPATIENT
Start: 2019-05-12 | End: 2019-05-22

## 2019-05-12 RX ORDER — CEFTRIAXONE 500 MG/1
1 INJECTION, POWDER, FOR SOLUTION INTRAMUSCULAR; INTRAVENOUS EVERY 24 HOURS
Refills: 0 | Status: DISCONTINUED | OUTPATIENT
Start: 2019-05-13 | End: 2019-05-22

## 2019-05-12 RX ORDER — ENOXAPARIN SODIUM 100 MG/ML
40 INJECTION SUBCUTANEOUS DAILY
Refills: 0 | Status: DISCONTINUED | OUTPATIENT
Start: 2019-05-12 | End: 2019-05-22

## 2019-05-12 RX ORDER — AZITHROMYCIN 500 MG/1
500 TABLET, FILM COATED ORAL EVERY 24 HOURS
Refills: 0 | Status: DISCONTINUED | OUTPATIENT
Start: 2019-05-13 | End: 2019-05-22

## 2019-05-12 RX ORDER — IPRATROPIUM/ALBUTEROL SULFATE 18-103MCG
3 AEROSOL WITH ADAPTER (GRAM) INHALATION
Refills: 0 | Status: COMPLETED | OUTPATIENT
Start: 2019-05-12 | End: 2019-05-12

## 2019-05-12 RX ORDER — PANTOPRAZOLE SODIUM 20 MG/1
40 TABLET, DELAYED RELEASE ORAL
Refills: 0 | Status: DISCONTINUED | OUTPATIENT
Start: 2019-05-12 | End: 2019-05-22

## 2019-05-12 RX ORDER — TIOTROPIUM BROMIDE 18 UG/1
1 CAPSULE ORAL; RESPIRATORY (INHALATION) DAILY
Refills: 0 | Status: DISCONTINUED | OUTPATIENT
Start: 2019-05-12 | End: 2019-05-12

## 2019-05-12 RX ORDER — TIOTROPIUM BROMIDE 18 UG/1
1 CAPSULE ORAL; RESPIRATORY (INHALATION) DAILY
Refills: 0 | Status: DISCONTINUED | OUTPATIENT
Start: 2019-05-12 | End: 2019-05-22

## 2019-05-12 RX ORDER — SERTRALINE 25 MG/1
100 TABLET, FILM COATED ORAL DAILY
Refills: 0 | Status: DISCONTINUED | OUTPATIENT
Start: 2019-05-12 | End: 2019-05-12

## 2019-05-12 RX ORDER — ALBUTEROL 90 UG/1
1 AEROSOL, METERED ORAL EVERY 4 HOURS
Refills: 0 | Status: DISCONTINUED | OUTPATIENT
Start: 2019-05-12 | End: 2019-05-12

## 2019-05-12 RX ORDER — IPRATROPIUM/ALBUTEROL SULFATE 18-103MCG
3 AEROSOL WITH ADAPTER (GRAM) INHALATION EVERY 6 HOURS
Refills: 0 | Status: DISCONTINUED | OUTPATIENT
Start: 2019-05-12 | End: 2019-05-12

## 2019-05-12 RX ORDER — SODIUM CHLORIDE 9 MG/ML
1000 INJECTION, SOLUTION INTRAVENOUS
Refills: 0 | Status: DISCONTINUED | OUTPATIENT
Start: 2019-05-12 | End: 2019-05-15

## 2019-05-12 RX ORDER — CEFTRIAXONE 500 MG/1
1 INJECTION, POWDER, FOR SOLUTION INTRAMUSCULAR; INTRAVENOUS ONCE
Refills: 0 | Status: COMPLETED | OUTPATIENT
Start: 2019-05-12 | End: 2019-05-12

## 2019-05-12 RX ADMIN — CEFTRIAXONE 100 GRAM(S): 500 INJECTION, POWDER, FOR SOLUTION INTRAMUSCULAR; INTRAVENOUS at 16:51

## 2019-05-12 RX ADMIN — Medication 3 MILLILITER(S): at 23:56

## 2019-05-12 RX ADMIN — OLANZAPINE 10 MILLIGRAM(S): 15 TABLET, FILM COATED ORAL at 20:03

## 2019-05-12 RX ADMIN — ARIPIPRAZOLE 10 MILLIGRAM(S): 15 TABLET ORAL at 20:03

## 2019-05-12 RX ADMIN — Medication 3 MILLILITER(S): at 13:46

## 2019-05-12 RX ADMIN — Medication 40 MILLIGRAM(S): at 21:19

## 2019-05-12 RX ADMIN — SODIUM CHLORIDE 70 MILLILITER(S): 9 INJECTION, SOLUTION INTRAVENOUS at 15:46

## 2019-05-12 RX ADMIN — SODIUM CHLORIDE 70 MILLILITER(S): 9 INJECTION, SOLUTION INTRAVENOUS at 21:20

## 2019-05-12 RX ADMIN — Medication 3 MILLILITER(S): at 17:23

## 2019-05-12 RX ADMIN — AZITHROMYCIN 255 MILLIGRAM(S): 500 TABLET, FILM COATED ORAL at 15:42

## 2019-05-12 RX ADMIN — DIVALPROEX SODIUM 500 MILLIGRAM(S): 500 TABLET, DELAYED RELEASE ORAL at 20:03

## 2019-05-12 RX ADMIN — Medication 2 MILLIGRAM(S): at 20:03

## 2019-05-12 RX ADMIN — SERTRALINE 100 MILLIGRAM(S): 25 TABLET, FILM COATED ORAL at 20:04

## 2019-05-12 RX ADMIN — Medication 125 MILLIGRAM(S): at 15:42

## 2019-05-12 NOTE — H&P ADULT - NSHPLABSRESULTS_GEN_ALL_CORE
LABS:                        11.3   10.97 )-----------( 209      ( 12 May 2019 13:20 )             37.9     05-12    139  |  100  |  11  ----------------------------<  97  4.8   |  32<H>  |  0.75    Ca    8.8      12 May 2019 13:20    TPro  8.3  /  Alb  3.1<L>  /  TBili  0.6  /  DBili  x   /  AST  17  /  ALT  9<L>  /  AlkPhos  91  05-12        CAPILLARY BLOOD GLUCOSE    < from: Xray Chest 1 View- PORTABLE-Urgent (05.12.19 @ 13:43) >      EXAM:  XR CHEST PORTABLE URGENT 1V                            PROCEDURE DATE:  05/12/2019          INTERPRETATION:  History: Shortness of breath.    Findings: Frontal chest.    Comparison: 12/10/2018.    Volume loss right lung with elevation right hemidiaphragm and diffuse   cystic lung changes. Right heart border is obscured. The left lung is   grossly clear. Degenerative changes of the spine.    Impression:    Unchanged volume loss right lung with diffuse cystic lung changes. Left   lung grossly clear.      < end of copied text >

## 2019-05-12 NOTE — ED ADULT NURSE NOTE - OBJECTIVE STATEMENT
pt A&OX3 with  at bedside with c/o of coughing and body aches x3 weeks. pt present with congestion and expiratory wheezing. PMH HTN Asthma

## 2019-05-12 NOTE — ED PROVIDER NOTE - PHYSICAL EXAMINATION
Pereira:  General: No distress.  Mentation at baseline.   HEENT: WNL  Chest/Lungs: ronchi throughout, no identifiable wheeze,   Heart: S1S2 RRR, No M/R/G, Pules equal Bilaterally in upper and lower extremities distally  Abd: soft, NT/ND, No guarding, No rebound.  No hernias, no palpable masses.  Extrem: FROM in all joints, no significant edema noted, No ulcers.  Cap refil < 2sec.  Skin: No rash noted, warm dry.  Neuro:  Grossly normal.  No difficulty ambulating. No focal deficits.  Psychiatric: No evidence of delusions. No SI/HI.  \

## 2019-05-12 NOTE — H&P ADULT - NSHPPOADEEPVENOUSTHROMB_GEN_A_CORE
Ongoing SW/CM Assessment/Plan of Care Note     See SW/CM flowsheets for goals and other objective data.    Patient/Family discharge goal (s):  Goal #1: Psychosocial needs assessed  Goal #2: Communication facilitated  Goal #3: Financial issues addressed    PT Recommendation:  Recommendation for Discharge: PT: Post acute therapy    OT Recommendation:  Recommendations for Discharge: OT: Sub-acute nursing home, Post acute therapy    SLP Recommendation:       Disposition:  SNF    Progress note:   SW following for placement. Plan is for pt to transfer today at 2:00 via [facility] to [facility] [place] ([phone]).  Pt will resume dialysis at [facility] ([address] [place] [address] [phone], fax [phone]) on 1/6 at 9:30.  [facility] will be providing recurrent transportation to dialysis with an 8:30  time.  Pt's dialysis schedule is Tuesday, Thursday and Saturday.    RN to RN report to be called to the above number.  Pt will transfer with all discharge paperwork.    SW will follow through discharge.  H. Blumberg MSW, LCSW [phone]         no

## 2019-05-12 NOTE — ED ADULT TRIAGE NOTE - CHIEF COMPLAINT QUOTE
Patient complaint of SOB and productive cough for 3 days, patient had (lungectomy or lobectomy), rhochi heard in base of lungs, pt on oxygen at home, pt saturation 82% on room air on the field.

## 2019-05-12 NOTE — H&P ADULT - HISTORY OF PRESENT ILLNESS
59yo, F with h/o COPD, Bronchiectasis, DD, Schizophrenia,  presents with SOB increasing for 2 weeks, associated with nonproductive cough, fever.  Noted CP.  Denies CP, palpitation, n/v/d, fever, chills, weakness, abdominal pain, dysuria.

## 2019-05-12 NOTE — ED PROVIDER NOTE - OBJECTIVE STATEMENT
here with sob today and cough    hx of same with recent dx of bronchiectasis.  hx of chronic pulmonary disease

## 2019-05-12 NOTE — ED ADULT NURSE NOTE - NSIMPLEMENTINTERV_GEN_ALL_ED
Implemented All Universal Safety Interventions:  Cushman to call system. Call bell, personal items and telephone within reach. Instruct patient to call for assistance. Room bathroom lighting operational. Non-slip footwear when patient is off stretcher. Physically safe environment: no spills, clutter or unnecessary equipment. Stretcher in lowest position, wheels locked, appropriate side rails in place.

## 2019-05-13 DIAGNOSIS — J96.12 CHRONIC RESPIRATORY FAILURE WITH HYPERCAPNIA: ICD-10-CM

## 2019-05-13 DIAGNOSIS — J96.11 CHRONIC RESPIRATORY FAILURE WITH HYPOXIA: ICD-10-CM

## 2019-05-13 LAB
ANION GAP SERPL CALC-SCNC: 8 MMOL/L — SIGNIFICANT CHANGE UP (ref 5–17)
BASE EXCESS BLDA CALC-SCNC: 2.8 MMOL/L — HIGH (ref -2–2)
BASE EXCESS BLDA CALC-SCNC: 5 MMOL/L — HIGH (ref -2–2)
BASE EXCESS BLDA CALC-SCNC: 5.7 MMOL/L — HIGH (ref -2–2)
BASE EXCESS BLDA CALC-SCNC: 7.5 MMOL/L — HIGH (ref -2–2)
BASE EXCESS BLDA CALC-SCNC: 8.9 MMOL/L — HIGH (ref -2–2)
BASE EXCESS BLDA CALC-SCNC: 9.8 MMOL/L — HIGH (ref -2–2)
BLOOD GAS COMMENTS: SIGNIFICANT CHANGE UP
BLOOD GAS SOURCE: SIGNIFICANT CHANGE UP
BUN SERPL-MCNC: 14 MG/DL — SIGNIFICANT CHANGE UP (ref 7–23)
CALCIUM SERPL-MCNC: 9 MG/DL — SIGNIFICANT CHANGE UP (ref 8.5–10.1)
CHLORIDE SERPL-SCNC: 101 MMOL/L — SIGNIFICANT CHANGE UP (ref 96–108)
CK SERPL-CCNC: 37 U/L — SIGNIFICANT CHANGE UP (ref 26–192)
CK SERPL-CCNC: 44 U/L — SIGNIFICANT CHANGE UP (ref 26–192)
CO2 SERPL-SCNC: 32 MMOL/L — HIGH (ref 22–31)
CREAT SERPL-MCNC: 0.7 MG/DL — SIGNIFICANT CHANGE UP (ref 0.5–1.3)
GLUCOSE SERPL-MCNC: 169 MG/DL — HIGH (ref 70–99)
HCO3 BLDA-SCNC: 31 MMOL/L — HIGH (ref 21–29)
HCO3 BLDA-SCNC: 33 MMOL/L — HIGH (ref 21–29)
HCO3 BLDA-SCNC: 35 MMOL/L — HIGH (ref 21–29)
HCO3 BLDA-SCNC: 35 MMOL/L — HIGH (ref 21–29)
HCO3 BLDA-SCNC: 37 MMOL/L — HIGH (ref 21–29)
HCO3 BLDA-SCNC: 37 MMOL/L — HIGH (ref 21–29)
HCT VFR BLD CALC: 35.7 % — SIGNIFICANT CHANGE UP (ref 34.5–45)
HGB BLD-MCNC: 10.7 G/DL — LOW (ref 11.5–15.5)
HOROWITZ INDEX BLDA+IHG-RTO: 0.3 — SIGNIFICANT CHANGE UP
HOROWITZ INDEX BLDA+IHG-RTO: 0.4 — SIGNIFICANT CHANGE UP
HOROWITZ INDEX BLDA+IHG-RTO: 28 — SIGNIFICANT CHANGE UP
HOROWITZ INDEX BLDA+IHG-RTO: 28 — SIGNIFICANT CHANGE UP
HOROWITZ INDEX BLDA+IHG-RTO: 40 — SIGNIFICANT CHANGE UP
HOROWITZ INDEX BLDA+IHG-RTO: 40 — SIGNIFICANT CHANGE UP
LDH SERPL L TO P-CCNC: 198 U/L — SIGNIFICANT CHANGE UP (ref 50–242)
MCHC RBC-ENTMCNC: 28.4 PG — SIGNIFICANT CHANGE UP (ref 27–34)
MCHC RBC-ENTMCNC: 30 GM/DL — LOW (ref 32–36)
MCV RBC AUTO: 94.7 FL — SIGNIFICANT CHANGE UP (ref 80–100)
NRBC # BLD: 0 /100 WBCS — SIGNIFICANT CHANGE UP (ref 0–0)
PCO2 BLDA: 66 MMHG — HIGH (ref 32–46)
PCO2 BLDA: 71 MMHG — CRITICAL HIGH (ref 32–46)
PCO2 BLDA: 74 MMHG — CRITICAL HIGH (ref 32–46)
PCO2 BLDA: 74 MMHG — CRITICAL HIGH (ref 32–46)
PCO2 BLDA: 80 MMHG — CRITICAL HIGH (ref 32–46)
PCO2 BLDA: 83 MMHG — CRITICAL HIGH (ref 32–46)
PH BLD: 7.24 — LOW (ref 7.35–7.45)
PH BLD: 7.26 — LOW (ref 7.35–7.45)
PH BLD: 7.27 — LOW (ref 7.35–7.45)
PH BLD: 7.29 — LOW (ref 7.35–7.45)
PH BLD: 7.32 — LOW (ref 7.35–7.45)
PH BLD: 7.34 — LOW (ref 7.35–7.45)
PLATELET # BLD AUTO: 210 K/UL — SIGNIFICANT CHANGE UP (ref 150–400)
PO2 BLDA: 105 MMHG — SIGNIFICANT CHANGE UP (ref 74–108)
PO2 BLDA: 118 MMHG — HIGH (ref 74–108)
PO2 BLDA: 123 MMHG — HIGH (ref 74–108)
PO2 BLDA: 67 MMHG — LOW (ref 74–108)
PO2 BLDA: 69 MMHG — LOW (ref 74–108)
PO2 BLDA: 94 MMHG — SIGNIFICANT CHANGE UP (ref 74–108)
POTASSIUM SERPL-MCNC: 4.5 MMOL/L — SIGNIFICANT CHANGE UP (ref 3.5–5.3)
POTASSIUM SERPL-SCNC: 4.5 MMOL/L — SIGNIFICANT CHANGE UP (ref 3.5–5.3)
PROCALCITONIN SERPL-MCNC: 0.03 NG/ML — SIGNIFICANT CHANGE UP (ref 0.02–0.1)
RBC # BLD: 3.77 M/UL — LOW (ref 3.8–5.2)
RBC # FLD: 17.8 % — HIGH (ref 10.3–14.5)
SAO2 % BLDA: 89 % — LOW (ref 92–96)
SAO2 % BLDA: 91 % — LOW (ref 92–96)
SAO2 % BLDA: 95 % — SIGNIFICANT CHANGE UP (ref 92–96)
SAO2 % BLDA: 96 % — SIGNIFICANT CHANGE UP (ref 92–96)
SAO2 % BLDA: 97 % — HIGH (ref 92–96)
SAO2 % BLDA: 97 % — HIGH (ref 92–96)
SODIUM SERPL-SCNC: 141 MMOL/L — SIGNIFICANT CHANGE UP (ref 135–145)
TROPONIN I SERPL-MCNC: <.015 NG/ML — SIGNIFICANT CHANGE UP (ref 0.01–0.04)
TROPONIN I SERPL-MCNC: <.015 NG/ML — SIGNIFICANT CHANGE UP (ref 0.01–0.04)
WBC # BLD: 10.08 K/UL — SIGNIFICANT CHANGE UP (ref 3.8–10.5)
WBC # FLD AUTO: 10.08 K/UL — SIGNIFICANT CHANGE UP (ref 3.8–10.5)

## 2019-05-13 PROCEDURE — 71250 CT THORAX DX C-: CPT | Mod: 26

## 2019-05-13 RX ORDER — IPRATROPIUM/ALBUTEROL SULFATE 18-103MCG
3 AEROSOL WITH ADAPTER (GRAM) INHALATION EVERY 4 HOURS
Refills: 0 | Status: DISCONTINUED | OUTPATIENT
Start: 2019-05-13 | End: 2019-05-16

## 2019-05-13 RX ORDER — CHLORHEXIDINE GLUCONATE 213 G/1000ML
1 SOLUTION TOPICAL
Refills: 0 | Status: DISCONTINUED | OUTPATIENT
Start: 2019-05-13 | End: 2019-05-22

## 2019-05-13 RX ADMIN — AZITHROMYCIN 255 MILLIGRAM(S): 500 TABLET, FILM COATED ORAL at 03:02

## 2019-05-13 RX ADMIN — Medication 2 MILLIGRAM(S): at 05:39

## 2019-05-13 RX ADMIN — ENOXAPARIN SODIUM 40 MILLIGRAM(S): 100 INJECTION SUBCUTANEOUS at 11:41

## 2019-05-13 RX ADMIN — SERTRALINE 100 MILLIGRAM(S): 25 TABLET, FILM COATED ORAL at 05:40

## 2019-05-13 RX ADMIN — CEFTRIAXONE 100 GRAM(S): 500 INJECTION, POWDER, FOR SOLUTION INTRAMUSCULAR; INTRAVENOUS at 17:59

## 2019-05-13 RX ADMIN — Medication 40 MILLIGRAM(S): at 22:42

## 2019-05-13 RX ADMIN — OLANZAPINE 10 MILLIGRAM(S): 15 TABLET, FILM COATED ORAL at 05:40

## 2019-05-13 RX ADMIN — Medication 3 MILLILITER(S): at 17:55

## 2019-05-13 RX ADMIN — TIOTROPIUM BROMIDE 1 CAPSULE(S): 18 CAPSULE ORAL; RESPIRATORY (INHALATION) at 11:41

## 2019-05-13 RX ADMIN — CHLORHEXIDINE GLUCONATE 1 APPLICATION(S): 213 SOLUTION TOPICAL at 18:06

## 2019-05-13 RX ADMIN — Medication 3 MILLILITER(S): at 11:04

## 2019-05-13 RX ADMIN — Medication 81 MILLIGRAM(S): at 11:42

## 2019-05-13 RX ADMIN — DIVALPROEX SODIUM 500 MILLIGRAM(S): 500 TABLET, DELAYED RELEASE ORAL at 18:06

## 2019-05-13 RX ADMIN — DIVALPROEX SODIUM 500 MILLIGRAM(S): 500 TABLET, DELAYED RELEASE ORAL at 05:40

## 2019-05-13 RX ADMIN — Medication 40 MILLIGRAM(S): at 13:18

## 2019-05-13 RX ADMIN — PANTOPRAZOLE SODIUM 40 MILLIGRAM(S): 20 TABLET, DELAYED RELEASE ORAL at 05:40

## 2019-05-13 RX ADMIN — Medication 3 MILLILITER(S): at 05:31

## 2019-05-13 RX ADMIN — OLANZAPINE 10 MILLIGRAM(S): 15 TABLET, FILM COATED ORAL at 18:06

## 2019-05-13 RX ADMIN — Medication 2 MILLIGRAM(S): at 18:06

## 2019-05-13 RX ADMIN — SERTRALINE 100 MILLIGRAM(S): 25 TABLET, FILM COATED ORAL at 18:06

## 2019-05-13 RX ADMIN — Medication 3 MILLILITER(S): at 22:25

## 2019-05-13 RX ADMIN — Medication 40 MILLIGRAM(S): at 05:40

## 2019-05-13 RX ADMIN — ARIPIPRAZOLE 10 MILLIGRAM(S): 15 TABLET ORAL at 05:39

## 2019-05-13 RX ADMIN — SODIUM CHLORIDE 70 MILLILITER(S): 9 INJECTION, SOLUTION INTRAVENOUS at 05:40

## 2019-05-13 NOTE — CONSULT NOTE ADULT - PROBLEM SELECTOR RECOMMENDATION 9
Will transfer to CCU for closer monitoring and possible intubation.  Will continue current treatment (although increase nebs to q4h).  Add metanebs to facilitate mobilization of secretions.  Check sputum culture,  repeat ABG,  keep NPO.

## 2019-05-13 NOTE — CONSULT NOTE ADULT - SUBJECTIVE AND OBJECTIVE BOX
Patient is a 58y old  Female who presents with a chief complaint of Cough and difficulty breathing. (13 May 2019 12:08)      HPI:  59yo, F with h/o COPD, Bronchiectasis, DD, Schizophrenia,  presents with SOB increasing for 2 weeks, associated with nonproductive cough, fever.  Noted CP.  Denies CP, palpitation, n/v/d, fever, chills, weakness, abdominal pain, dysuria. (12 May 2019 15:43)    Consultation called for concern of rising hypercapnia on ABG. Pt on bipap.    Allergies    No Known Allergies    Intolerances        MEDICATIONS  (STANDING):  ALBUTerol/ipratropium for Nebulization 3 milliLiter(s) Nebulizer every 6 hours  ARIPiprazole 10 milliGRAM(s) Oral <User Schedule>  aspirin enteric coated 81 milliGRAM(s) Oral daily  azithromycin  IVPB 500 milliGRAM(s) IV Intermittent every 24 hours  benztropine 2 milliGRAM(s) Oral two times a day  cefTRIAXone   IVPB 1 Gram(s) IV Intermittent every 24 hours  cefTRIAXone   IVPB      chlorhexidine 4% Liquid 1 Application(s) Topical <User Schedule>  dextrose 5% + sodium chloride 0.45%. 1000 milliLiter(s) (70 mL/Hr) IV Continuous <Continuous>  diVALproex  milliGRAM(s) Oral two times a day  enoxaparin Injectable 40 milliGRAM(s) SubCutaneous daily  methylPREDNISolone sodium succinate Injectable 40 milliGRAM(s) IV Push every 8 hours  OLANZapine 10 milliGRAM(s) Oral two times a day  pantoprazole    Tablet 40 milliGRAM(s) Oral before breakfast  sertraline 100 milliGRAM(s) Oral every 12 hours  tiotropium 18 MICROgram(s) Capsule 1 Capsule(s) Inhalation daily    MEDICATIONS  (PRN):      Daily     Daily     Drug Dosing Weight  Height (cm): 157.48 (12 May 2019 12:32)  Weight (kg): 72.6 (12 May 2019 12:32)  BMI (kg/m2): 29.3 (12 May 2019 12:32)  BSA (m2): 1.74 (12 May 2019 12:32)    PAST MEDICAL & SURGICAL HISTORY:  Schizophrenia  Tuberculosis  Lung disease  Autism  Hypoxia  No significant past surgical history      FAMILY HISTORY:  No pertinent family history in first degree relatives      SOCIAL HISTORY:    ADVANCE DIRECTIVES:        ICU Vital Signs Last 24 Hrs  T(C): 36.1 (13 May 2019 12:48), Max: 37 (12 May 2019 17:45)  T(F): 97 (13 May 2019 12:48), Max: 98.6 (12 May 2019 17:45)  HR: 75 (13 May 2019 12:48) (73 - 88)  BP: 113/61 (13 May 2019 12:48) (113/61 - 130/70)  BP(mean): --  ABP: --  ABP(mean): --  RR: 18 (12 May 2019 17:45) (18 - 18)  SpO2: 94% (13 May 2019 12:48) (93% - 99%)      ABG - ( 13 May 2019 15:06 )  pH, Arterial: x     pH, Blood: 7.24  /  pCO2: 83    /  pO2: 123   / HCO3: 35    / Base Excess: 5.7   /  SaO2: 97                  I&O's Detail    13 May 2019 07:01  -  13 May 2019 15:58  --------------------------------------------------------  IN:    Oral Fluid: 120 mL  Total IN: 120 mL    OUT:  Total OUT: 0 mL    Total NET: 120 mL          PHYSICAL EXAM:    GENERAL: NAD, well-groomed, well-developed  HEAD:  Atraumatic, Normocephalic  ENMT: bipap mask on   NECK: Supple, No JVD, Normal thyroid  NERVOUS SYSTEM:  Alert & Oriented X3, Good concentration; Motor Strength 5/5 B/L upper and lower extremities;  CHEST/LUNG: loud coarse breath sound sbilaterally  HEART: Regular rate and rhythm;   ABDOMEN: Soft, Nontender, Nondistended; Bowel sounds present  EXTREMITIES:  No clubbing, cyanosis, or edema    LABS:  CBC Full  -  ( 13 May 2019 03:00 )  WBC Count : 10.08 K/uL  RBC Count : 3.77 M/uL  Hemoglobin : 10.7 g/dL  Hematocrit : 35.7 %  Platelet Count - Automated : 210 K/uL  Mean Cell Volume : 94.7 fl  Mean Cell Hemoglobin : 28.4 pg  Mean Cell Hemoglobin Concentration : 30.0 gm/dL  Auto Neutrophil # : x  Auto Lymphocyte # : x  Auto Monocyte # : x  Auto Eosinophil # : x  Auto Basophil # : x  Auto Neutrophil % : x  Auto Lymphocyte % : x  Auto Monocyte % : x  Auto Eosinophil % : x  Auto Basophil % : x    05-13    141  |  101  |  14  ----------------------------<  169<H>  4.5   |  32<H>  |  0.70    Ca    9.0      13 May 2019 03:00    TPro  8.3  /  Alb  3.1<L>  /  TBili  0.6  /  DBili  x   /  AST  17  /  ALT  9<L>  /  AlkPhos  91  05-12    CAPILLARY BLOOD GLUCOSE            CARDIAC MARKERS ( 13 May 2019 11:43 )  <.015 ng/mL / x     / 37 U/L / x     / x      CARDIAC MARKERS ( 13 May 2019 03:00 )  <.015 ng/mL / x     / 44 U/L / x     / x      CARDIAC MARKERS ( 12 May 2019 20:01 )  <.015 ng/mL / x     / 44 U/L / x     / x              EKG:    ECHO, US:    RADIOLOGY:    CRITICAL CARE TIME SPENT:

## 2019-05-13 NOTE — CONSULT NOTE ADULT - ATTENDING COMMENTS
57F PMH schizophrenia, autism, COPD, chronic hypoxic and hypercapnic respiratory failure on home O2 and BiPAP,  h/o TB w/ secondary R lung bronchiectasis presents for 2 weeks of chest congestion, SOB, cough, wheeze. Admitted to medical service for COPD exacerbation with PNA. Course today with acute on chronic hypercarbic respiratory failure while on BiPAP. 7.24/83/123/35/97%. Transferred to ICU for respiratory monitoring. DX: acute on chronic hypercarbic respiratory failure, chronic hypoxic respiratory failure, acute exacerbation of bronchiectasis, COPD exacerbation, PNA.     1. NEURO  - pt awake and alert on biPAP    2. PULM  - cont BiPAP  - repeat ABG  - pt per floor nurse had been removing BiPAP mask by self intermittently to use bathroom, etc  - will maintain continuous non invasive ventilation   - consider AVAPS if hypercarbia isn't significantly changed or improved with biPAP  - chest PT, metaneb to mobilize secretions, pt states she is unable to expectorate  - family to bring pt's chest vest in from home if possible  - duonebs, IV steroids  - check sputum cx  - on azithromycin and ceftriaxone at present time  - broaden coverage if worsening fever or leukocytosis develops  - CT chest reviewed with chronic cystic bronchiectasis of R lung with volume loss and nodular opacities of left lung which may be infectious  - intubation if hypercarbia and mental status worsens    3. PSYCH  - cont home psych meds    4. GEN  - full code    Critical Care Time: 45 min

## 2019-05-13 NOTE — CONSULT NOTE ADULT - SUBJECTIVE AND OBJECTIVE BOX
Patient is a 58y old  Female who presents with a chief complaint of Cough and difficulty breathing. (12 May 2019 15:43)    HPI:  59yo, F with  COPD, Bronchiectasis, TB, Right Lung Collapse,  Chronic hypoxic hypercarbic Respiratory failure on home O2 and NIV/ Trilogy, Schizophrenia,    Presented  with SOB increasing for 2 weeks, associated with nonproductive cough, fever.  Noted CP.  Denies CP, palpitation, n/v/d, fever, chills, weakness, abdominal pain, dysuria. Not able to provide more details.    PAST MEDICAL & SURGICAL HISTORY:  Schizophrenia  Tuberculosis  Lung disease  Autism  Hypoxia  No significant past surgical history    FAMILY HISTORY:  No pertinent family history in first degree relatives    SOCIAL HISTORY: BMI (kg/m2): 29.3 (05-12 @ 12:32)    Allergies  No Known Allergies    MEDICATIONS  (STANDING):  ALBUTerol/ipratropium for Nebulization 3 milliLiter(s) Nebulizer every 6 hours  ARIPiprazole 10 milliGRAM(s) Oral <User Schedule>  aspirin enteric coated 81 milliGRAM(s) Oral daily  azithromycin  IVPB 500 milliGRAM(s) IV Intermittent every 24 hours  benztropine 2 milliGRAM(s) Oral two times a day  cefTRIAXone   IVPB 1 Gram(s) IV Intermittent every 24 hours  cefTRIAXone   IVPB      dextrose 5% + sodium chloride 0.45%. 1000 milliLiter(s) (70 mL/Hr) IV Continuous <Continuous>  diVALproex  milliGRAM(s) Oral two times a day  enoxaparin Injectable 40 milliGRAM(s) SubCutaneous daily  methylPREDNISolone sodium succinate Injectable 40 milliGRAM(s) IV Push every 8 hours  OLANZapine 10 milliGRAM(s) Oral two times a day  pantoprazole    Tablet 40 milliGRAM(s) Oral before breakfast  sertraline 100 milliGRAM(s) Oral every 12 hours  tiotropium 18 MICROgram(s) Capsule 1 Capsule(s) Inhalation daily    REVIEW OF SYSTEMS:  not able to provide details.    Vital Signs Last 24 Hrs  T(C): 37 (12 May 2019 17:45), Max: 37.7 (12 May 2019 12:32)  T(F): 98.6 (12 May 2019 17:45), Max: 99.9 (12 May 2019 12:32)  HR: 82 (13 May 2019 06:22) (79 - 88)  BP: 121/69 (12 May 2019 17:45) (114/70 - 130/70)  BP(mean): --  RR: 18 (12 May 2019 17:45) (18 - 18)  SpO2: 98% (13 May 2019 06:22) (96% - 99%)    PHYSICAL EXAM:  GEN:         Awake, responsive but some what nervous looking..  HEENT:    Normal.    RESP:       wheezing, crackles.  CVS:          Regular rate and rhythm.   ABD:         Soft, non-tender, non-distended;   :            No costovertebral angle tenderness  SKIN:           Warm and dry.  EXTR:            No clubbing, cyanosis or edema  CNS:           right weakness  PSYCH:        not very cooperative.    LABS:                        10.7   10.08 )-----------( 210      ( 13 May 2019 03:00 )             35.7     05-13    141  |  101  |  14  ----------------------------<  169<H>  4.5   |  32<H>  |  0.70    Ca    9.0      13 May 2019 03:00    TPro  8.3  /  Alb  3.1<L>  /  TBili  0.6  /  DBili  x   /  AST  17  /  ALT  9<L>  /  AlkPhos  91  05-12    EKG: sinus rhythm    RADIOLOGY & ADDITIONAL STUDIES:  < from: Xray Chest 1 View- PORTABLE-Urgent (05.12.19 @ 13:43) >    EXAM:  XR CHEST PORTABLE URGENT 1V                          PROCEDURE DATE:  05/12/2019      INTERPRETATION:  History: Shortness of breath.    Findings: Frontal chest.    Comparison: 12/10/2018.    Volume loss right lung with elevation right hemidiaphragm and diffuse   cystic lung changes. Right heart border is obscured. The left lung is   grossly clear. Degenerative changes of the spine.    Impression:    Unchanged volume loss right lung with diffuse cystic lung changes. Left   lung grossly clear.    HARPER IQBAL M.D. ATTENDING RADIOLOGIST  This document has been electronically signed. May 12 2019  3:18PM      ASSESSMENT AND PLAN:  ·	SOB.  ·	Acute COPD Exacerbation.  ·	Infected Bronchiectasis/Pneumonia.  ·	Chronic hypoxic hypercarbic Respiratory failure.  ·	Anemia.  ·	History of TB with Right lung collapse.  ·	Schizophrenia.    Obtain ABG for acid  base and oxygenation status.  Supplemental O2 with nocturnal NIV.  Continue nebulizer and steroids.  Procalcitonin level.

## 2019-05-13 NOTE — CONSULT NOTE ADULT - ASSESSMENT
57 yo female with PMH: COPD, cystic bronchiectasis admitted with c/o worsening sob and cough with fever.  Admitted to medical floor and treated for presumptive pna and copd exacerbation.

## 2019-05-14 DIAGNOSIS — J96.22 ACUTE AND CHRONIC RESPIRATORY FAILURE WITH HYPERCAPNIA: ICD-10-CM

## 2019-05-14 LAB
ALBUMIN SERPL ELPH-MCNC: 2.7 G/DL — LOW (ref 3.3–5)
ALBUMIN SERPL ELPH-MCNC: 2.8 G/DL — LOW (ref 3.3–5)
ALP SERPL-CCNC: 78 U/L — SIGNIFICANT CHANGE UP (ref 40–120)
ALP SERPL-CCNC: 79 U/L — SIGNIFICANT CHANGE UP (ref 40–120)
ALT FLD-CCNC: 10 U/L — LOW (ref 12–78)
ALT FLD-CCNC: 8 U/L — LOW (ref 12–78)
ANION GAP SERPL CALC-SCNC: 3 MMOL/L — LOW (ref 5–17)
ANION GAP SERPL CALC-SCNC: 4 MMOL/L — LOW (ref 5–17)
AST SERPL-CCNC: 15 U/L — SIGNIFICANT CHANGE UP (ref 15–37)
AST SERPL-CCNC: 7 U/L — LOW (ref 15–37)
BASE EXCESS BLDA CALC-SCNC: 10.2 MMOL/L — HIGH (ref -2–2)
BASE EXCESS BLDA CALC-SCNC: 10.8 MMOL/L — HIGH (ref -2–2)
BILIRUB SERPL-MCNC: 0.3 MG/DL — SIGNIFICANT CHANGE UP (ref 0.2–1.2)
BILIRUB SERPL-MCNC: 0.3 MG/DL — SIGNIFICANT CHANGE UP (ref 0.2–1.2)
BLOOD GAS COMMENTS: SIGNIFICANT CHANGE UP
BLOOD GAS SOURCE: SIGNIFICANT CHANGE UP
BLOOD GAS SOURCE: SIGNIFICANT CHANGE UP
BUN SERPL-MCNC: 15 MG/DL — SIGNIFICANT CHANGE UP (ref 7–23)
BUN SERPL-MCNC: 15 MG/DL — SIGNIFICANT CHANGE UP (ref 7–23)
CALCIUM SERPL-MCNC: 8.6 MG/DL — SIGNIFICANT CHANGE UP (ref 8.5–10.1)
CALCIUM SERPL-MCNC: 8.7 MG/DL — SIGNIFICANT CHANGE UP (ref 8.5–10.1)
CHLORIDE SERPL-SCNC: 100 MMOL/L — SIGNIFICANT CHANGE UP (ref 96–108)
CHLORIDE SERPL-SCNC: 100 MMOL/L — SIGNIFICANT CHANGE UP (ref 96–108)
CO2 SERPL-SCNC: 35 MMOL/L — HIGH (ref 22–31)
CO2 SERPL-SCNC: 36 MMOL/L — HIGH (ref 22–31)
CREAT SERPL-MCNC: 0.57 MG/DL — SIGNIFICANT CHANGE UP (ref 0.5–1.3)
CREAT SERPL-MCNC: 0.62 MG/DL — SIGNIFICANT CHANGE UP (ref 0.5–1.3)
GLUCOSE SERPL-MCNC: 114 MG/DL — HIGH (ref 70–99)
GLUCOSE SERPL-MCNC: 149 MG/DL — HIGH (ref 70–99)
HCO3 BLDA-SCNC: 37 MMOL/L — HIGH (ref 21–29)
HCO3 BLDA-SCNC: 37 MMOL/L — HIGH (ref 21–29)
HCT VFR BLD CALC: 35.6 % — SIGNIFICANT CHANGE UP (ref 34.5–45)
HGB BLD-MCNC: 10.2 G/DL — LOW (ref 11.5–15.5)
HOROWITZ INDEX BLDA+IHG-RTO: 0.3 — SIGNIFICANT CHANGE UP
HOROWITZ INDEX BLDA+IHG-RTO: 30 — SIGNIFICANT CHANGE UP
MAGNESIUM SERPL-MCNC: 2.5 MG/DL — SIGNIFICANT CHANGE UP (ref 1.6–2.6)
MCHC RBC-ENTMCNC: 27.3 PG — SIGNIFICANT CHANGE UP (ref 27–34)
MCHC RBC-ENTMCNC: 28.7 GM/DL — LOW (ref 32–36)
MCV RBC AUTO: 95.4 FL — SIGNIFICANT CHANGE UP (ref 80–100)
NRBC # BLD: 0 /100 WBCS — SIGNIFICANT CHANGE UP (ref 0–0)
PCO2 BLDA: 60 MMHG — HIGH (ref 32–46)
PCO2 BLDA: 70 MMHG — CRITICAL HIGH (ref 32–46)
PH BLD: 7.35 — SIGNIFICANT CHANGE UP (ref 7.35–7.45)
PH BLD: 7.41 — SIGNIFICANT CHANGE UP (ref 7.35–7.45)
PHOSPHATE SERPL-MCNC: 2.5 MG/DL — SIGNIFICANT CHANGE UP (ref 2.5–4.5)
PLATELET # BLD AUTO: 217 K/UL — SIGNIFICANT CHANGE UP (ref 150–400)
PO2 BLDA: 69 MMHG — LOW (ref 74–108)
PO2 BLDA: 88 MMHG — SIGNIFICANT CHANGE UP (ref 74–108)
POTASSIUM SERPL-MCNC: 4.4 MMOL/L — SIGNIFICANT CHANGE UP (ref 3.5–5.3)
POTASSIUM SERPL-MCNC: 5.7 MMOL/L — HIGH (ref 3.5–5.3)
POTASSIUM SERPL-SCNC: 4.4 MMOL/L — SIGNIFICANT CHANGE UP (ref 3.5–5.3)
POTASSIUM SERPL-SCNC: 5.7 MMOL/L — HIGH (ref 3.5–5.3)
PROT SERPL-MCNC: 7.5 GM/DL — SIGNIFICANT CHANGE UP (ref 6–8.3)
PROT SERPL-MCNC: 7.8 GM/DL — SIGNIFICANT CHANGE UP (ref 6–8.3)
RBC # BLD: 3.73 M/UL — LOW (ref 3.8–5.2)
RBC # FLD: 17.7 % — HIGH (ref 10.3–14.5)
SAO2 % BLDA: 91 % — LOW (ref 92–96)
SAO2 % BLDA: 96 % — SIGNIFICANT CHANGE UP (ref 92–96)
SODIUM SERPL-SCNC: 138 MMOL/L — SIGNIFICANT CHANGE UP (ref 135–145)
SODIUM SERPL-SCNC: 140 MMOL/L — SIGNIFICANT CHANGE UP (ref 135–145)
WBC # BLD: 13.33 K/UL — HIGH (ref 3.8–10.5)
WBC # FLD AUTO: 13.33 K/UL — HIGH (ref 3.8–10.5)

## 2019-05-14 RX ORDER — ACETAMINOPHEN 500 MG
650 TABLET ORAL EVERY 6 HOURS
Refills: 0 | Status: DISCONTINUED | OUTPATIENT
Start: 2019-05-14 | End: 2019-05-22

## 2019-05-14 RX ADMIN — CHLORHEXIDINE GLUCONATE 1 APPLICATION(S): 213 SOLUTION TOPICAL at 13:07

## 2019-05-14 RX ADMIN — Medication 3 MILLILITER(S): at 21:29

## 2019-05-14 RX ADMIN — AZITHROMYCIN 255 MILLIGRAM(S): 500 TABLET, FILM COATED ORAL at 05:49

## 2019-05-14 RX ADMIN — SERTRALINE 100 MILLIGRAM(S): 25 TABLET, FILM COATED ORAL at 05:51

## 2019-05-14 RX ADMIN — Medication 3 MILLILITER(S): at 11:18

## 2019-05-14 RX ADMIN — ARIPIPRAZOLE 10 MILLIGRAM(S): 15 TABLET ORAL at 06:38

## 2019-05-14 RX ADMIN — SODIUM CHLORIDE 70 MILLILITER(S): 9 INJECTION, SOLUTION INTRAVENOUS at 15:39

## 2019-05-14 RX ADMIN — Medication 40 MILLIGRAM(S): at 13:06

## 2019-05-14 RX ADMIN — Medication 3 MILLILITER(S): at 16:13

## 2019-05-14 RX ADMIN — OLANZAPINE 10 MILLIGRAM(S): 15 TABLET, FILM COATED ORAL at 05:51

## 2019-05-14 RX ADMIN — Medication 3 MILLILITER(S): at 01:22

## 2019-05-14 RX ADMIN — SODIUM CHLORIDE 70 MILLILITER(S): 9 INJECTION, SOLUTION INTRAVENOUS at 05:51

## 2019-05-14 RX ADMIN — DIVALPROEX SODIUM 500 MILLIGRAM(S): 500 TABLET, DELAYED RELEASE ORAL at 05:51

## 2019-05-14 RX ADMIN — Medication 40 MILLIGRAM(S): at 05:50

## 2019-05-14 RX ADMIN — Medication 2 MILLIGRAM(S): at 17:17

## 2019-05-14 RX ADMIN — Medication 3 MILLILITER(S): at 18:50

## 2019-05-14 RX ADMIN — CEFTRIAXONE 100 GRAM(S): 500 INJECTION, POWDER, FOR SOLUTION INTRAMUSCULAR; INTRAVENOUS at 15:39

## 2019-05-14 RX ADMIN — OLANZAPINE 10 MILLIGRAM(S): 15 TABLET, FILM COATED ORAL at 17:18

## 2019-05-14 RX ADMIN — SODIUM CHLORIDE 70 MILLILITER(S): 9 INJECTION, SOLUTION INTRAVENOUS at 22:18

## 2019-05-14 RX ADMIN — Medication 40 MILLIGRAM(S): at 22:15

## 2019-05-14 RX ADMIN — Medication 81 MILLIGRAM(S): at 13:06

## 2019-05-14 RX ADMIN — Medication 3 MILLILITER(S): at 05:15

## 2019-05-14 RX ADMIN — DIVALPROEX SODIUM 500 MILLIGRAM(S): 500 TABLET, DELAYED RELEASE ORAL at 17:18

## 2019-05-14 RX ADMIN — Medication 2 MILLIGRAM(S): at 05:50

## 2019-05-14 RX ADMIN — ENOXAPARIN SODIUM 40 MILLIGRAM(S): 100 INJECTION SUBCUTANEOUS at 13:06

## 2019-05-14 RX ADMIN — TIOTROPIUM BROMIDE 1 CAPSULE(S): 18 CAPSULE ORAL; RESPIRATORY (INHALATION) at 13:14

## 2019-05-14 RX ADMIN — SERTRALINE 100 MILLIGRAM(S): 25 TABLET, FILM COATED ORAL at 17:18

## 2019-05-14 RX ADMIN — PANTOPRAZOLE SODIUM 40 MILLIGRAM(S): 20 TABLET, DELAYED RELEASE ORAL at 05:52

## 2019-05-14 RX ADMIN — Medication 650 MILLIGRAM(S): at 07:18

## 2019-05-14 RX ADMIN — Medication 650 MILLIGRAM(S): at 06:39

## 2019-05-14 NOTE — DIETITIAN INITIAL EVALUATION ADULT. - PERTINENT LABORATORY DATA
05-14 Na 140 mmol/L Glu 149 mg/dL<H> K+ 4.4 mmol/L Cr 0.62 mg/dL BUN 15 mg/dL Phos n/a   Alb 2.8 g/dL<L> PAB n/a   Hgb 10.2   Hct 35.6   HgA1C n/a    Glucose, Serum: 149 mg/dL <H>

## 2019-05-14 NOTE — DIETITIAN INITIAL EVALUATION ADULT. - PERTINENT MEDS FT
acetaminophen   Tablet .. PRN  ALBUTerol/ipratropium for Nebulization  ARIPiprazole  aspirin enteric coated  azithromycin  IVPB  benztropine  cefTRIAXone   IVPB  cefTRIAXone   IVPB  chlorhexidine 4% Liquid  dextrose 5% + sodium chloride 0.45%.  diVALproex   enoxaparin Injectable  methylPREDNISolone sodium succinate Injectable  OLANZapine  pantoprazole    Tablet  sertraline  tiotropium 18 MICROgram(s) Capsule

## 2019-05-14 NOTE — DIETITIAN INITIAL EVALUATION ADULT. - OTHER INFO
Pt seen for CCU admission. Pt lives with brother and unable to obtain full diet  hx PTA due to SOB; on BIPAP PRN.  Pt with COPD & schizophrenia with increased SOB x 2 weeks presents acute exacerbation of bronchiectasis, pneumonia, COPD exacerbation. Po diet advanced today to start @ lunch 5/14/19.

## 2019-05-15 LAB
ANION GAP SERPL CALC-SCNC: 4 MMOL/L — LOW (ref 5–17)
BASE EXCESS BLDA CALC-SCNC: 8.4 MMOL/L — HIGH (ref -2–2)
BLOOD GAS COMMENTS: SIGNIFICANT CHANGE UP
BLOOD GAS COMMENTS: SIGNIFICANT CHANGE UP
BLOOD GAS SOURCE: SIGNIFICANT CHANGE UP
BUN SERPL-MCNC: 18 MG/DL — SIGNIFICANT CHANGE UP (ref 7–23)
CALCIUM SERPL-MCNC: 8.6 MG/DL — SIGNIFICANT CHANGE UP (ref 8.5–10.1)
CHLORIDE SERPL-SCNC: 100 MMOL/L — SIGNIFICANT CHANGE UP (ref 96–108)
CO2 SERPL-SCNC: 35 MMOL/L — HIGH (ref 22–31)
CREAT SERPL-MCNC: 0.67 MG/DL — SIGNIFICANT CHANGE UP (ref 0.5–1.3)
GLUCOSE SERPL-MCNC: 162 MG/DL — HIGH (ref 70–99)
HCO3 BLDA-SCNC: 36 MMOL/L — HIGH (ref 21–29)
HCT VFR BLD CALC: 34 % — LOW (ref 34.5–45)
HGB BLD-MCNC: 9.9 G/DL — LOW (ref 11.5–15.5)
HOROWITZ INDEX BLDA+IHG-RTO: 32 — SIGNIFICANT CHANGE UP
MAGNESIUM SERPL-MCNC: 2.2 MG/DL — SIGNIFICANT CHANGE UP (ref 1.6–2.6)
MCHC RBC-ENTMCNC: 27.7 PG — SIGNIFICANT CHANGE UP (ref 27–34)
MCHC RBC-ENTMCNC: 29.1 GM/DL — LOW (ref 32–36)
MCV RBC AUTO: 95.2 FL — SIGNIFICANT CHANGE UP (ref 80–100)
NRBC # BLD: 0 /100 WBCS — SIGNIFICANT CHANGE UP (ref 0–0)
PCO2 BLDA: 67 MMHG — HIGH (ref 32–46)
PH BLD: 7.35 — SIGNIFICANT CHANGE UP (ref 7.35–7.45)
PHOSPHATE SERPL-MCNC: 2.8 MG/DL — SIGNIFICANT CHANGE UP (ref 2.5–4.5)
PLATELET # BLD AUTO: 240 K/UL — SIGNIFICANT CHANGE UP (ref 150–400)
PO2 BLDA: 114 MMHG — HIGH (ref 74–108)
POTASSIUM SERPL-MCNC: 4.2 MMOL/L — SIGNIFICANT CHANGE UP (ref 3.5–5.3)
POTASSIUM SERPL-SCNC: 4.2 MMOL/L — SIGNIFICANT CHANGE UP (ref 3.5–5.3)
RBC # BLD: 3.57 M/UL — LOW (ref 3.8–5.2)
RBC # FLD: 17.9 % — HIGH (ref 10.3–14.5)
SAO2 % BLDA: 97 % — HIGH (ref 92–96)
SODIUM SERPL-SCNC: 139 MMOL/L — SIGNIFICANT CHANGE UP (ref 135–145)
WBC # BLD: 11.21 K/UL — HIGH (ref 3.8–10.5)
WBC # FLD AUTO: 11.21 K/UL — HIGH (ref 3.8–10.5)

## 2019-05-15 RX ORDER — SODIUM CHLORIDE 9 MG/ML
3 INJECTION INTRAMUSCULAR; INTRAVENOUS; SUBCUTANEOUS EVERY 6 HOURS
Refills: 0 | Status: DISCONTINUED | OUTPATIENT
Start: 2019-05-15 | End: 2019-05-15

## 2019-05-15 RX ORDER — SODIUM CHLORIDE 9 MG/ML
3 INJECTION INTRAMUSCULAR; INTRAVENOUS; SUBCUTANEOUS EVERY 8 HOURS
Refills: 0 | Status: DISCONTINUED | OUTPATIENT
Start: 2019-05-15 | End: 2019-05-22

## 2019-05-15 RX ADMIN — Medication 3 MILLILITER(S): at 01:05

## 2019-05-15 RX ADMIN — DIVALPROEX SODIUM 500 MILLIGRAM(S): 500 TABLET, DELAYED RELEASE ORAL at 05:31

## 2019-05-15 RX ADMIN — CHLORHEXIDINE GLUCONATE 1 APPLICATION(S): 213 SOLUTION TOPICAL at 11:36

## 2019-05-15 RX ADMIN — PANTOPRAZOLE SODIUM 40 MILLIGRAM(S): 20 TABLET, DELAYED RELEASE ORAL at 07:48

## 2019-05-15 RX ADMIN — Medication 3 MILLILITER(S): at 10:35

## 2019-05-15 RX ADMIN — Medication 3 MILLILITER(S): at 17:30

## 2019-05-15 RX ADMIN — OLANZAPINE 10 MILLIGRAM(S): 15 TABLET, FILM COATED ORAL at 05:31

## 2019-05-15 RX ADMIN — ARIPIPRAZOLE 10 MILLIGRAM(S): 15 TABLET ORAL at 05:31

## 2019-05-15 RX ADMIN — SERTRALINE 100 MILLIGRAM(S): 25 TABLET, FILM COATED ORAL at 18:18

## 2019-05-15 RX ADMIN — SODIUM CHLORIDE 3 MILLILITER(S): 9 INJECTION INTRAMUSCULAR; INTRAVENOUS; SUBCUTANEOUS at 10:35

## 2019-05-15 RX ADMIN — ENOXAPARIN SODIUM 40 MILLIGRAM(S): 100 INJECTION SUBCUTANEOUS at 11:36

## 2019-05-15 RX ADMIN — Medication 40 MILLIGRAM(S): at 05:31

## 2019-05-15 RX ADMIN — AZITHROMYCIN 255 MILLIGRAM(S): 500 TABLET, FILM COATED ORAL at 04:20

## 2019-05-15 RX ADMIN — CEFTRIAXONE 100 GRAM(S): 500 INJECTION, POWDER, FOR SOLUTION INTRAMUSCULAR; INTRAVENOUS at 13:52

## 2019-05-15 RX ADMIN — Medication 40 MILLIGRAM(S): at 18:17

## 2019-05-15 RX ADMIN — Medication 3 MILLILITER(S): at 22:06

## 2019-05-15 RX ADMIN — OLANZAPINE 10 MILLIGRAM(S): 15 TABLET, FILM COATED ORAL at 18:18

## 2019-05-15 RX ADMIN — Medication 81 MILLIGRAM(S): at 11:36

## 2019-05-15 RX ADMIN — SODIUM CHLORIDE 3 MILLILITER(S): 9 INJECTION INTRAMUSCULAR; INTRAVENOUS; SUBCUTANEOUS at 13:53

## 2019-05-15 RX ADMIN — Medication 3 MILLILITER(S): at 05:14

## 2019-05-15 RX ADMIN — DIVALPROEX SODIUM 500 MILLIGRAM(S): 500 TABLET, DELAYED RELEASE ORAL at 18:18

## 2019-05-15 RX ADMIN — Medication 3 MILLILITER(S): at 13:51

## 2019-05-15 RX ADMIN — SERTRALINE 100 MILLIGRAM(S): 25 TABLET, FILM COATED ORAL at 05:31

## 2019-05-15 RX ADMIN — SODIUM CHLORIDE 3 MILLILITER(S): 9 INJECTION INTRAMUSCULAR; INTRAVENOUS; SUBCUTANEOUS at 22:19

## 2019-05-15 RX ADMIN — Medication 2 MILLIGRAM(S): at 18:18

## 2019-05-15 RX ADMIN — TIOTROPIUM BROMIDE 1 CAPSULE(S): 18 CAPSULE ORAL; RESPIRATORY (INHALATION) at 11:36

## 2019-05-15 RX ADMIN — Medication 2 MILLIGRAM(S): at 05:31

## 2019-05-15 NOTE — CHART NOTE - NSCHARTNOTEFT_GEN_A_CORE
HPI:  59yo, F with h/o COPD, Bronchiectasis, DD, Schizophrenia,  presented with SOB increasing for 2 weeks, associated with nonproductive cough, fever.  Noted CP.  Denies CP, palpitation, n/v/d, fever, chills, weakness, abdominal pain, dysuria. (12 May 2019 15:43)    hospital/ICU:     Admitted to medical service for COPD exacerbation with PNA. Course with acute on chronic hypercarbic respiratory failure while on BiPAP. 7.24/83/123/35/97%. Transferred to ICU for respiratory monitoring. DX: acute on chronic hypercarbic respiratory failure, chronic hypoxic respiratory failure, acute exacerbation of bronchiectasis, COPD exacerbation, PNA.     PULM  - cont non invasive ventilation with AVAPS at night and prn during the day  - cont chest PT, chest vest q4 hours to help mobilize secretions  - cont with duonebs, added hypertonic saline nebs to aide in expectoration of secretions   - taper steroids, decrease solumedrol 40mg q8 hours to 40mg q12 hours  - unable to obtain sputum for sputum cx  - on azithromycin and ceftriaxone (day 4 of antibiotics)   - broaden coverage if worsening fever or leukocytosis   - CT chest reviewed with chronic cystic bronchiectasis of Right lung with volume loss and nodular opacities of left lung which may be infectious    hemodynamically stable for transfer. D/W Dr. Bonds

## 2019-05-16 LAB
ANION GAP SERPL CALC-SCNC: 4 MMOL/L — LOW (ref 5–17)
BUN SERPL-MCNC: 21 MG/DL — SIGNIFICANT CHANGE UP (ref 7–23)
CALCIUM SERPL-MCNC: 8.6 MG/DL — SIGNIFICANT CHANGE UP (ref 8.5–10.1)
CHLORIDE SERPL-SCNC: 99 MMOL/L — SIGNIFICANT CHANGE UP (ref 96–108)
CO2 SERPL-SCNC: 39 MMOL/L — HIGH (ref 22–31)
CREAT SERPL-MCNC: 0.61 MG/DL — SIGNIFICANT CHANGE UP (ref 0.5–1.3)
GLUCOSE SERPL-MCNC: 85 MG/DL — SIGNIFICANT CHANGE UP (ref 70–99)
HCT VFR BLD CALC: 35.7 % — SIGNIFICANT CHANGE UP (ref 34.5–45)
HGB BLD-MCNC: 10.5 G/DL — LOW (ref 11.5–15.5)
MAGNESIUM SERPL-MCNC: 2.3 MG/DL — SIGNIFICANT CHANGE UP (ref 1.6–2.6)
MCHC RBC-ENTMCNC: 27.9 PG — SIGNIFICANT CHANGE UP (ref 27–34)
MCHC RBC-ENTMCNC: 29.4 GM/DL — LOW (ref 32–36)
MCV RBC AUTO: 94.9 FL — SIGNIFICANT CHANGE UP (ref 80–100)
NRBC # BLD: 0 /100 WBCS — SIGNIFICANT CHANGE UP (ref 0–0)
PHOSPHATE SERPL-MCNC: 3.8 MG/DL — SIGNIFICANT CHANGE UP (ref 2.5–4.5)
PLATELET # BLD AUTO: 231 K/UL — SIGNIFICANT CHANGE UP (ref 150–400)
POTASSIUM SERPL-MCNC: 4.3 MMOL/L — SIGNIFICANT CHANGE UP (ref 3.5–5.3)
POTASSIUM SERPL-SCNC: 4.3 MMOL/L — SIGNIFICANT CHANGE UP (ref 3.5–5.3)
RBC # BLD: 3.76 M/UL — LOW (ref 3.8–5.2)
RBC # FLD: 17.7 % — HIGH (ref 10.3–14.5)
SODIUM SERPL-SCNC: 142 MMOL/L — SIGNIFICANT CHANGE UP (ref 135–145)
WBC # BLD: 10.2 K/UL — SIGNIFICANT CHANGE UP (ref 3.8–10.5)
WBC # FLD AUTO: 10.2 K/UL — SIGNIFICANT CHANGE UP (ref 3.8–10.5)

## 2019-05-16 RX ORDER — IPRATROPIUM/ALBUTEROL SULFATE 18-103MCG
3 AEROSOL WITH ADAPTER (GRAM) INHALATION EVERY 6 HOURS
Refills: 0 | Status: DISCONTINUED | OUTPATIENT
Start: 2019-05-16 | End: 2019-05-22

## 2019-05-16 RX ADMIN — AZITHROMYCIN 255 MILLIGRAM(S): 500 TABLET, FILM COATED ORAL at 03:04

## 2019-05-16 RX ADMIN — CEFTRIAXONE 100 GRAM(S): 500 INJECTION, POWDER, FOR SOLUTION INTRAMUSCULAR; INTRAVENOUS at 15:47

## 2019-05-16 RX ADMIN — SERTRALINE 100 MILLIGRAM(S): 25 TABLET, FILM COATED ORAL at 17:40

## 2019-05-16 RX ADMIN — SODIUM CHLORIDE 3 MILLILITER(S): 9 INJECTION INTRAMUSCULAR; INTRAVENOUS; SUBCUTANEOUS at 21:38

## 2019-05-16 RX ADMIN — Medication 2 MILLIGRAM(S): at 17:41

## 2019-05-16 RX ADMIN — Medication 40 MILLIGRAM(S): at 05:53

## 2019-05-16 RX ADMIN — Medication 3 MILLILITER(S): at 05:16

## 2019-05-16 RX ADMIN — Medication 2 MILLIGRAM(S): at 05:54

## 2019-05-16 RX ADMIN — TIOTROPIUM BROMIDE 1 CAPSULE(S): 18 CAPSULE ORAL; RESPIRATORY (INHALATION) at 11:55

## 2019-05-16 RX ADMIN — CHLORHEXIDINE GLUCONATE 1 APPLICATION(S): 213 SOLUTION TOPICAL at 05:54

## 2019-05-16 RX ADMIN — Medication 3 MILLILITER(S): at 23:39

## 2019-05-16 RX ADMIN — ENOXAPARIN SODIUM 40 MILLIGRAM(S): 100 INJECTION SUBCUTANEOUS at 11:56

## 2019-05-16 RX ADMIN — SODIUM CHLORIDE 3 MILLILITER(S): 9 INJECTION INTRAMUSCULAR; INTRAVENOUS; SUBCUTANEOUS at 13:31

## 2019-05-16 RX ADMIN — SERTRALINE 100 MILLIGRAM(S): 25 TABLET, FILM COATED ORAL at 05:54

## 2019-05-16 RX ADMIN — OLANZAPINE 10 MILLIGRAM(S): 15 TABLET, FILM COATED ORAL at 05:54

## 2019-05-16 RX ADMIN — DIVALPROEX SODIUM 500 MILLIGRAM(S): 500 TABLET, DELAYED RELEASE ORAL at 17:40

## 2019-05-16 RX ADMIN — ARIPIPRAZOLE 10 MILLIGRAM(S): 15 TABLET ORAL at 05:54

## 2019-05-16 RX ADMIN — PANTOPRAZOLE SODIUM 40 MILLIGRAM(S): 20 TABLET, DELAYED RELEASE ORAL at 07:38

## 2019-05-16 RX ADMIN — Medication 3 MILLILITER(S): at 17:56

## 2019-05-16 RX ADMIN — Medication 3 MILLILITER(S): at 01:40

## 2019-05-16 RX ADMIN — Medication 81 MILLIGRAM(S): at 11:55

## 2019-05-16 RX ADMIN — Medication 3 MILLILITER(S): at 11:11

## 2019-05-16 RX ADMIN — SODIUM CHLORIDE 3 MILLILITER(S): 9 INJECTION INTRAMUSCULAR; INTRAVENOUS; SUBCUTANEOUS at 05:20

## 2019-05-16 RX ADMIN — Medication 40 MILLIGRAM(S): at 17:40

## 2019-05-16 RX ADMIN — OLANZAPINE 10 MILLIGRAM(S): 15 TABLET, FILM COATED ORAL at 17:40

## 2019-05-16 RX ADMIN — DIVALPROEX SODIUM 500 MILLIGRAM(S): 500 TABLET, DELAYED RELEASE ORAL at 05:54

## 2019-05-17 LAB
ANION GAP SERPL CALC-SCNC: 5 MMOL/L — SIGNIFICANT CHANGE UP (ref 5–17)
BUN SERPL-MCNC: 23 MG/DL — SIGNIFICANT CHANGE UP (ref 7–23)
CALCIUM SERPL-MCNC: 8.7 MG/DL — SIGNIFICANT CHANGE UP (ref 8.5–10.1)
CHLORIDE SERPL-SCNC: 97 MMOL/L — SIGNIFICANT CHANGE UP (ref 96–108)
CO2 SERPL-SCNC: 40 MMOL/L — HIGH (ref 22–31)
CREAT SERPL-MCNC: 0.69 MG/DL — SIGNIFICANT CHANGE UP (ref 0.5–1.3)
CULTURE RESULTS: SIGNIFICANT CHANGE UP
CULTURE RESULTS: SIGNIFICANT CHANGE UP
GLUCOSE SERPL-MCNC: 80 MG/DL — SIGNIFICANT CHANGE UP (ref 70–99)
HCT VFR BLD CALC: 38.5 % — SIGNIFICANT CHANGE UP (ref 34.5–45)
HGB BLD-MCNC: 11.2 G/DL — LOW (ref 11.5–15.5)
MAGNESIUM SERPL-MCNC: 2.4 MG/DL — SIGNIFICANT CHANGE UP (ref 1.6–2.6)
MCHC RBC-ENTMCNC: 27.7 PG — SIGNIFICANT CHANGE UP (ref 27–34)
MCHC RBC-ENTMCNC: 29.1 GM/DL — LOW (ref 32–36)
MCV RBC AUTO: 95.1 FL — SIGNIFICANT CHANGE UP (ref 80–100)
NRBC # BLD: 0 /100 WBCS — SIGNIFICANT CHANGE UP (ref 0–0)
PLATELET # BLD AUTO: 258 K/UL — SIGNIFICANT CHANGE UP (ref 150–400)
POTASSIUM SERPL-MCNC: 4.1 MMOL/L — SIGNIFICANT CHANGE UP (ref 3.5–5.3)
POTASSIUM SERPL-SCNC: 4.1 MMOL/L — SIGNIFICANT CHANGE UP (ref 3.5–5.3)
RBC # BLD: 4.05 M/UL — SIGNIFICANT CHANGE UP (ref 3.8–5.2)
RBC # FLD: 17.7 % — HIGH (ref 10.3–14.5)
SODIUM SERPL-SCNC: 142 MMOL/L — SIGNIFICANT CHANGE UP (ref 135–145)
SPECIMEN SOURCE: SIGNIFICANT CHANGE UP
SPECIMEN SOURCE: SIGNIFICANT CHANGE UP
WBC # BLD: 13.45 K/UL — HIGH (ref 3.8–10.5)
WBC # FLD AUTO: 13.45 K/UL — HIGH (ref 3.8–10.5)

## 2019-05-17 RX ADMIN — SODIUM CHLORIDE 3 MILLILITER(S): 9 INJECTION INTRAMUSCULAR; INTRAVENOUS; SUBCUTANEOUS at 15:10

## 2019-05-17 RX ADMIN — Medication 2 MILLIGRAM(S): at 05:58

## 2019-05-17 RX ADMIN — DIVALPROEX SODIUM 500 MILLIGRAM(S): 500 TABLET, DELAYED RELEASE ORAL at 05:58

## 2019-05-17 RX ADMIN — CEFTRIAXONE 100 GRAM(S): 500 INJECTION, POWDER, FOR SOLUTION INTRAMUSCULAR; INTRAVENOUS at 15:49

## 2019-05-17 RX ADMIN — OLANZAPINE 10 MILLIGRAM(S): 15 TABLET, FILM COATED ORAL at 05:58

## 2019-05-17 RX ADMIN — SODIUM CHLORIDE 3 MILLILITER(S): 9 INJECTION INTRAMUSCULAR; INTRAVENOUS; SUBCUTANEOUS at 22:43

## 2019-05-17 RX ADMIN — PANTOPRAZOLE SODIUM 40 MILLIGRAM(S): 20 TABLET, DELAYED RELEASE ORAL at 08:41

## 2019-05-17 RX ADMIN — TIOTROPIUM BROMIDE 1 CAPSULE(S): 18 CAPSULE ORAL; RESPIRATORY (INHALATION) at 12:36

## 2019-05-17 RX ADMIN — Medication 3 MILLILITER(S): at 05:40

## 2019-05-17 RX ADMIN — Medication 3 MILLILITER(S): at 11:08

## 2019-05-17 RX ADMIN — Medication 3 MILLILITER(S): at 23:49

## 2019-05-17 RX ADMIN — Medication 81 MILLIGRAM(S): at 12:35

## 2019-05-17 RX ADMIN — Medication 2 MILLIGRAM(S): at 17:44

## 2019-05-17 RX ADMIN — SERTRALINE 100 MILLIGRAM(S): 25 TABLET, FILM COATED ORAL at 17:44

## 2019-05-17 RX ADMIN — Medication 40 MILLIGRAM(S): at 05:58

## 2019-05-17 RX ADMIN — SODIUM CHLORIDE 3 MILLILITER(S): 9 INJECTION INTRAMUSCULAR; INTRAVENOUS; SUBCUTANEOUS at 05:43

## 2019-05-17 RX ADMIN — Medication 3 MILLILITER(S): at 17:00

## 2019-05-17 RX ADMIN — Medication 40 MILLIGRAM(S): at 17:45

## 2019-05-17 RX ADMIN — DIVALPROEX SODIUM 500 MILLIGRAM(S): 500 TABLET, DELAYED RELEASE ORAL at 17:44

## 2019-05-17 RX ADMIN — ARIPIPRAZOLE 10 MILLIGRAM(S): 15 TABLET ORAL at 05:58

## 2019-05-17 RX ADMIN — ENOXAPARIN SODIUM 40 MILLIGRAM(S): 100 INJECTION SUBCUTANEOUS at 12:35

## 2019-05-17 RX ADMIN — SERTRALINE 100 MILLIGRAM(S): 25 TABLET, FILM COATED ORAL at 05:58

## 2019-05-17 RX ADMIN — AZITHROMYCIN 255 MILLIGRAM(S): 500 TABLET, FILM COATED ORAL at 02:55

## 2019-05-17 RX ADMIN — OLANZAPINE 10 MILLIGRAM(S): 15 TABLET, FILM COATED ORAL at 17:44

## 2019-05-18 LAB
HCT VFR BLD CALC: 37.2 % — SIGNIFICANT CHANGE UP (ref 34.5–45)
HGB BLD-MCNC: 11.2 G/DL — LOW (ref 11.5–15.5)
MCHC RBC-ENTMCNC: 27.9 PG — SIGNIFICANT CHANGE UP (ref 27–34)
MCHC RBC-ENTMCNC: 30.1 GM/DL — LOW (ref 32–36)
MCV RBC AUTO: 92.8 FL — SIGNIFICANT CHANGE UP (ref 80–100)
NRBC # BLD: 0 /100 WBCS — SIGNIFICANT CHANGE UP (ref 0–0)
PLATELET # BLD AUTO: 245 K/UL — SIGNIFICANT CHANGE UP (ref 150–400)
RBC # BLD: 4.01 M/UL — SIGNIFICANT CHANGE UP (ref 3.8–5.2)
RBC # FLD: 17.4 % — HIGH (ref 10.3–14.5)
WBC # BLD: 13.47 K/UL — HIGH (ref 3.8–10.5)
WBC # FLD AUTO: 13.47 K/UL — HIGH (ref 3.8–10.5)

## 2019-05-18 RX ADMIN — DIVALPROEX SODIUM 500 MILLIGRAM(S): 500 TABLET, DELAYED RELEASE ORAL at 06:03

## 2019-05-18 RX ADMIN — CEFTRIAXONE 100 GRAM(S): 500 INJECTION, POWDER, FOR SOLUTION INTRAMUSCULAR; INTRAVENOUS at 15:40

## 2019-05-18 RX ADMIN — DIVALPROEX SODIUM 500 MILLIGRAM(S): 500 TABLET, DELAYED RELEASE ORAL at 18:32

## 2019-05-18 RX ADMIN — SODIUM CHLORIDE 3 MILLILITER(S): 9 INJECTION INTRAMUSCULAR; INTRAVENOUS; SUBCUTANEOUS at 05:26

## 2019-05-18 RX ADMIN — SERTRALINE 100 MILLIGRAM(S): 25 TABLET, FILM COATED ORAL at 06:01

## 2019-05-18 RX ADMIN — Medication 3 MILLILITER(S): at 05:23

## 2019-05-18 RX ADMIN — Medication 81 MILLIGRAM(S): at 12:38

## 2019-05-18 RX ADMIN — Medication 3 MILLILITER(S): at 17:19

## 2019-05-18 RX ADMIN — Medication 40 MILLIGRAM(S): at 06:03

## 2019-05-18 RX ADMIN — OLANZAPINE 10 MILLIGRAM(S): 15 TABLET, FILM COATED ORAL at 06:01

## 2019-05-18 RX ADMIN — SODIUM CHLORIDE 3 MILLILITER(S): 9 INJECTION INTRAMUSCULAR; INTRAVENOUS; SUBCUTANEOUS at 13:36

## 2019-05-18 RX ADMIN — Medication 2 MILLIGRAM(S): at 06:02

## 2019-05-18 RX ADMIN — OLANZAPINE 10 MILLIGRAM(S): 15 TABLET, FILM COATED ORAL at 18:32

## 2019-05-18 RX ADMIN — TIOTROPIUM BROMIDE 1 CAPSULE(S): 18 CAPSULE ORAL; RESPIRATORY (INHALATION) at 15:41

## 2019-05-18 RX ADMIN — Medication 2 MILLIGRAM(S): at 18:32

## 2019-05-18 RX ADMIN — ARIPIPRAZOLE 10 MILLIGRAM(S): 15 TABLET ORAL at 06:01

## 2019-05-18 RX ADMIN — SERTRALINE 100 MILLIGRAM(S): 25 TABLET, FILM COATED ORAL at 18:32

## 2019-05-18 RX ADMIN — ENOXAPARIN SODIUM 40 MILLIGRAM(S): 100 INJECTION SUBCUTANEOUS at 12:38

## 2019-05-18 RX ADMIN — Medication 40 MILLIGRAM(S): at 18:32

## 2019-05-18 RX ADMIN — Medication 3 MILLILITER(S): at 11:48

## 2019-05-18 RX ADMIN — AZITHROMYCIN 255 MILLIGRAM(S): 500 TABLET, FILM COATED ORAL at 05:30

## 2019-05-19 RX ADMIN — Medication 81 MILLIGRAM(S): at 12:20

## 2019-05-19 RX ADMIN — DIVALPROEX SODIUM 500 MILLIGRAM(S): 500 TABLET, DELAYED RELEASE ORAL at 06:15

## 2019-05-19 RX ADMIN — AZITHROMYCIN 255 MILLIGRAM(S): 500 TABLET, FILM COATED ORAL at 06:15

## 2019-05-19 RX ADMIN — TIOTROPIUM BROMIDE 1 CAPSULE(S): 18 CAPSULE ORAL; RESPIRATORY (INHALATION) at 12:20

## 2019-05-19 RX ADMIN — Medication 3 MILLILITER(S): at 11:08

## 2019-05-19 RX ADMIN — Medication 3 MILLILITER(S): at 00:33

## 2019-05-19 RX ADMIN — SERTRALINE 100 MILLIGRAM(S): 25 TABLET, FILM COATED ORAL at 06:15

## 2019-05-19 RX ADMIN — Medication 2 MILLIGRAM(S): at 06:15

## 2019-05-19 RX ADMIN — ARIPIPRAZOLE 10 MILLIGRAM(S): 15 TABLET ORAL at 06:15

## 2019-05-19 RX ADMIN — SODIUM CHLORIDE 3 MILLILITER(S): 9 INJECTION INTRAMUSCULAR; INTRAVENOUS; SUBCUTANEOUS at 14:03

## 2019-05-19 RX ADMIN — SODIUM CHLORIDE 3 MILLILITER(S): 9 INJECTION INTRAMUSCULAR; INTRAVENOUS; SUBCUTANEOUS at 05:34

## 2019-05-19 RX ADMIN — Medication 3 MILLILITER(S): at 23:26

## 2019-05-19 RX ADMIN — ENOXAPARIN SODIUM 40 MILLIGRAM(S): 100 INJECTION SUBCUTANEOUS at 12:20

## 2019-05-19 RX ADMIN — Medication 3 MILLILITER(S): at 17:13

## 2019-05-19 RX ADMIN — SERTRALINE 100 MILLIGRAM(S): 25 TABLET, FILM COATED ORAL at 18:23

## 2019-05-19 RX ADMIN — Medication 2 MILLIGRAM(S): at 18:24

## 2019-05-19 RX ADMIN — Medication 40 MILLIGRAM(S): at 18:23

## 2019-05-19 RX ADMIN — OLANZAPINE 10 MILLIGRAM(S): 15 TABLET, FILM COATED ORAL at 18:23

## 2019-05-19 RX ADMIN — Medication 3 MILLILITER(S): at 05:33

## 2019-05-19 RX ADMIN — Medication 40 MILLIGRAM(S): at 06:15

## 2019-05-19 RX ADMIN — CEFTRIAXONE 100 GRAM(S): 500 INJECTION, POWDER, FOR SOLUTION INTRAMUSCULAR; INTRAVENOUS at 18:23

## 2019-05-19 RX ADMIN — DIVALPROEX SODIUM 500 MILLIGRAM(S): 500 TABLET, DELAYED RELEASE ORAL at 18:23

## 2019-05-19 RX ADMIN — OLANZAPINE 10 MILLIGRAM(S): 15 TABLET, FILM COATED ORAL at 06:15

## 2019-05-20 LAB
HCT VFR BLD CALC: 36.8 % — SIGNIFICANT CHANGE UP (ref 34.5–45)
HGB BLD-MCNC: 11 G/DL — LOW (ref 11.5–15.5)
MCHC RBC-ENTMCNC: 27.6 PG — SIGNIFICANT CHANGE UP (ref 27–34)
MCHC RBC-ENTMCNC: 29.9 GM/DL — LOW (ref 32–36)
MCV RBC AUTO: 92.5 FL — SIGNIFICANT CHANGE UP (ref 80–100)
NRBC # BLD: 0 /100 WBCS — SIGNIFICANT CHANGE UP (ref 0–0)
PLATELET # BLD AUTO: 268 K/UL — SIGNIFICANT CHANGE UP (ref 150–400)
RBC # BLD: 3.98 M/UL — SIGNIFICANT CHANGE UP (ref 3.8–5.2)
RBC # FLD: 17.5 % — HIGH (ref 10.3–14.5)
WBC # BLD: 15.13 K/UL — HIGH (ref 3.8–10.5)
WBC # FLD AUTO: 15.13 K/UL — HIGH (ref 3.8–10.5)

## 2019-05-20 RX ADMIN — SODIUM CHLORIDE 3 MILLILITER(S): 9 INJECTION INTRAMUSCULAR; INTRAVENOUS; SUBCUTANEOUS at 05:09

## 2019-05-20 RX ADMIN — ENOXAPARIN SODIUM 40 MILLIGRAM(S): 100 INJECTION SUBCUTANEOUS at 11:06

## 2019-05-20 RX ADMIN — Medication 2 MILLIGRAM(S): at 17:25

## 2019-05-20 RX ADMIN — Medication 40 MILLIGRAM(S): at 06:17

## 2019-05-20 RX ADMIN — CEFTRIAXONE 100 GRAM(S): 500 INJECTION, POWDER, FOR SOLUTION INTRAMUSCULAR; INTRAVENOUS at 15:02

## 2019-05-20 RX ADMIN — SERTRALINE 100 MILLIGRAM(S): 25 TABLET, FILM COATED ORAL at 17:26

## 2019-05-20 RX ADMIN — Medication 3 MILLILITER(S): at 23:23

## 2019-05-20 RX ADMIN — Medication 3 MILLILITER(S): at 17:17

## 2019-05-20 RX ADMIN — Medication 81 MILLIGRAM(S): at 11:06

## 2019-05-20 RX ADMIN — Medication 20 MILLIGRAM(S): at 17:26

## 2019-05-20 RX ADMIN — OLANZAPINE 10 MILLIGRAM(S): 15 TABLET, FILM COATED ORAL at 06:17

## 2019-05-20 RX ADMIN — DIVALPROEX SODIUM 500 MILLIGRAM(S): 500 TABLET, DELAYED RELEASE ORAL at 06:16

## 2019-05-20 RX ADMIN — SODIUM CHLORIDE 3 MILLILITER(S): 9 INJECTION INTRAMUSCULAR; INTRAVENOUS; SUBCUTANEOUS at 22:22

## 2019-05-20 RX ADMIN — PANTOPRAZOLE SODIUM 40 MILLIGRAM(S): 20 TABLET, DELAYED RELEASE ORAL at 09:23

## 2019-05-20 RX ADMIN — ARIPIPRAZOLE 10 MILLIGRAM(S): 15 TABLET ORAL at 06:17

## 2019-05-20 RX ADMIN — TIOTROPIUM BROMIDE 1 CAPSULE(S): 18 CAPSULE ORAL; RESPIRATORY (INHALATION) at 11:06

## 2019-05-20 RX ADMIN — SODIUM CHLORIDE 3 MILLILITER(S): 9 INJECTION INTRAMUSCULAR; INTRAVENOUS; SUBCUTANEOUS at 14:21

## 2019-05-20 RX ADMIN — AZITHROMYCIN 255 MILLIGRAM(S): 500 TABLET, FILM COATED ORAL at 06:17

## 2019-05-20 RX ADMIN — SERTRALINE 100 MILLIGRAM(S): 25 TABLET, FILM COATED ORAL at 06:17

## 2019-05-20 RX ADMIN — OLANZAPINE 10 MILLIGRAM(S): 15 TABLET, FILM COATED ORAL at 18:19

## 2019-05-20 RX ADMIN — DIVALPROEX SODIUM 500 MILLIGRAM(S): 500 TABLET, DELAYED RELEASE ORAL at 17:26

## 2019-05-20 RX ADMIN — Medication 3 MILLILITER(S): at 05:08

## 2019-05-20 RX ADMIN — Medication 3 MILLILITER(S): at 11:12

## 2019-05-20 RX ADMIN — Medication 2 MILLIGRAM(S): at 06:17

## 2019-05-20 NOTE — PHYSICAL THERAPY INITIAL EVALUATION ADULT - ASR EQUIP NEEDS DISCH PT EVAL
Rolling walker, 3 :1 commode, pt was unsure whether she received the equipments at home Rolling walker,

## 2019-05-20 NOTE — PHYSICAL THERAPY INITIAL EVALUATION ADULT - ADDITIONAL COMMENTS
As per care coordinator notes , Pt resides in a house  with 5 steps to enter into the house, with her sister, daughter , she was Independent in her ambulation without any assistive device, needs supervision for ADLs, she has a HHA for 8 hours/5 days. She has Home O2.

## 2019-05-20 NOTE — PHYSICAL THERAPY INITIAL EVALUATION ADULT - CRITERIA FOR SKILLED THERAPEUTIC INTERVENTIONS
Home with home PT/rehab potential/impairments found/functional limitations in following categories/predicted duration of therapy intervention/risk reduction/prevention

## 2019-05-21 LAB
ANION GAP SERPL CALC-SCNC: 4 MMOL/L — LOW (ref 5–17)
BUN SERPL-MCNC: 23 MG/DL — SIGNIFICANT CHANGE UP (ref 7–23)
CALCIUM SERPL-MCNC: 8.6 MG/DL — SIGNIFICANT CHANGE UP (ref 8.5–10.1)
CHLORIDE SERPL-SCNC: 98 MMOL/L — SIGNIFICANT CHANGE UP (ref 96–108)
CO2 SERPL-SCNC: 36 MMOL/L — HIGH (ref 22–31)
CREAT SERPL-MCNC: 0.65 MG/DL — SIGNIFICANT CHANGE UP (ref 0.5–1.3)
GLUCOSE SERPL-MCNC: 128 MG/DL — HIGH (ref 70–99)
HCT VFR BLD CALC: 38.3 % — SIGNIFICANT CHANGE UP (ref 34.5–45)
HGB BLD-MCNC: 11.3 G/DL — LOW (ref 11.5–15.5)
MCHC RBC-ENTMCNC: 27.8 PG — SIGNIFICANT CHANGE UP (ref 27–34)
MCHC RBC-ENTMCNC: 29.5 GM/DL — LOW (ref 32–36)
MCV RBC AUTO: 94.1 FL — SIGNIFICANT CHANGE UP (ref 80–100)
NRBC # BLD: 0 /100 WBCS — SIGNIFICANT CHANGE UP (ref 0–0)
PLATELET # BLD AUTO: 202 K/UL — SIGNIFICANT CHANGE UP (ref 150–400)
POTASSIUM SERPL-MCNC: 4.4 MMOL/L — SIGNIFICANT CHANGE UP (ref 3.5–5.3)
POTASSIUM SERPL-SCNC: 4.4 MMOL/L — SIGNIFICANT CHANGE UP (ref 3.5–5.3)
RBC # BLD: 4.07 M/UL — SIGNIFICANT CHANGE UP (ref 3.8–5.2)
RBC # FLD: 17.8 % — HIGH (ref 10.3–14.5)
SODIUM SERPL-SCNC: 138 MMOL/L — SIGNIFICANT CHANGE UP (ref 135–145)
WBC # BLD: 10.77 K/UL — HIGH (ref 3.8–10.5)
WBC # FLD AUTO: 10.77 K/UL — HIGH (ref 3.8–10.5)

## 2019-05-21 RX ADMIN — TIOTROPIUM BROMIDE 1 CAPSULE(S): 18 CAPSULE ORAL; RESPIRATORY (INHALATION) at 12:26

## 2019-05-21 RX ADMIN — Medication 3 MILLILITER(S): at 05:54

## 2019-05-21 RX ADMIN — OLANZAPINE 10 MILLIGRAM(S): 15 TABLET, FILM COATED ORAL at 17:35

## 2019-05-21 RX ADMIN — DIVALPROEX SODIUM 500 MILLIGRAM(S): 500 TABLET, DELAYED RELEASE ORAL at 05:04

## 2019-05-21 RX ADMIN — Medication 20 MILLIGRAM(S): at 17:35

## 2019-05-21 RX ADMIN — OLANZAPINE 10 MILLIGRAM(S): 15 TABLET, FILM COATED ORAL at 05:04

## 2019-05-21 RX ADMIN — CEFTRIAXONE 100 GRAM(S): 500 INJECTION, POWDER, FOR SOLUTION INTRAMUSCULAR; INTRAVENOUS at 14:41

## 2019-05-21 RX ADMIN — Medication 3 MILLILITER(S): at 17:00

## 2019-05-21 RX ADMIN — SODIUM CHLORIDE 3 MILLILITER(S): 9 INJECTION INTRAMUSCULAR; INTRAVENOUS; SUBCUTANEOUS at 06:00

## 2019-05-21 RX ADMIN — SODIUM CHLORIDE 3 MILLILITER(S): 9 INJECTION INTRAMUSCULAR; INTRAVENOUS; SUBCUTANEOUS at 14:08

## 2019-05-21 RX ADMIN — SERTRALINE 100 MILLIGRAM(S): 25 TABLET, FILM COATED ORAL at 05:04

## 2019-05-21 RX ADMIN — Medication 81 MILLIGRAM(S): at 11:55

## 2019-05-21 RX ADMIN — Medication 3 MILLILITER(S): at 11:18

## 2019-05-21 RX ADMIN — AZITHROMYCIN 255 MILLIGRAM(S): 500 TABLET, FILM COATED ORAL at 04:55

## 2019-05-21 RX ADMIN — SODIUM CHLORIDE 3 MILLILITER(S): 9 INJECTION INTRAMUSCULAR; INTRAVENOUS; SUBCUTANEOUS at 21:08

## 2019-05-21 RX ADMIN — Medication 2 MILLIGRAM(S): at 05:04

## 2019-05-21 RX ADMIN — Medication 20 MILLIGRAM(S): at 05:03

## 2019-05-21 RX ADMIN — Medication 2 MILLIGRAM(S): at 17:35

## 2019-05-21 RX ADMIN — DIVALPROEX SODIUM 500 MILLIGRAM(S): 500 TABLET, DELAYED RELEASE ORAL at 17:35

## 2019-05-21 RX ADMIN — PANTOPRAZOLE SODIUM 40 MILLIGRAM(S): 20 TABLET, DELAYED RELEASE ORAL at 07:30

## 2019-05-21 RX ADMIN — ENOXAPARIN SODIUM 40 MILLIGRAM(S): 100 INJECTION SUBCUTANEOUS at 11:55

## 2019-05-21 RX ADMIN — ARIPIPRAZOLE 10 MILLIGRAM(S): 15 TABLET ORAL at 05:04

## 2019-05-21 RX ADMIN — SERTRALINE 100 MILLIGRAM(S): 25 TABLET, FILM COATED ORAL at 17:35

## 2019-05-21 NOTE — PROGRESS NOTE ADULT - ASSESSMENT
58F PMH schizophrenia, autism, COPD, chronic hypoxic and hypercapnic respiratory failure on home O2 and BiPAP,  h/o TB w/ secondary R lung bronchiectasis presents for 2 weeks of chest congestion, SOB, cough, wheeze. Admitted to medical service for COPD exacerbation with PNA. Course with acute on chronic hypercarbic respiratory failure while on BiPAP. 7.24/83/123/35/97%. Transferred to ICU for respiratory monitoring. DX: acute on chronic hypercarbic respiratory failure, chronic hypoxic respiratory failure, acute exacerbation of bronchiectasis, COPD exacerbation, PNA.     1. NEURO  - pt awake and alert     2. PULM  - cont non invasive ventilation with AVAPS at night and prn during the day  - cont chest PT, chest vest q4 hours to help mobilize secretions  - cont with duonebs, will add hypertonic saline to aide in expectoration of secretions   - taper steroids, decrease solumedrol 40mg q8 hours to 40mg q12 hours  - unable to obtain sputum for sputum cx  - on azithromycin and ceftriaxone (day 4 of antibiotics)   - broaden coverage if worsening fever or leukocytosis   - CT chest reviewed with chronic cystic bronchiectasis of R lung with volume loss and nodular opacities of left lung which may be infectious      3. PSYCH  - cont home psych meds    4. GEN  - full code  - OOB to chair  - stable for transfer to medical floor
58F PMH schizophrenia, autism, COPD, chronic hypoxic and hypercapnic respiratory failure on home O2 and BiPAP,  h/o TB w/ secondary R lung bronchiectasis presents for 2 weeks of chest congestion, SOB, cough, wheeze. Admitted to medical service for COPD exacerbation with PNA. Course with acute on chronic hypercarbic respiratory failure while on BiPAP. 7.24/83/123/35/97%. Transferred to ICU for respiratory monitoring. DX: acute on chronic hypercarbic respiratory failure, chronic hypoxic respiratory failure, acute exacerbation of bronchiectasis, COPD exacerbation, PNA.     1. NEURO  - pt awake and alert on biPAP    2. PULM  - cont non invasive vent with AVAPS  - chest PT, metaneb to mobilize secretions, pt states she is unable to expectorate  - family to bring pt's chest vest in from home if possible  - duonebs, IV steroids  - check sputum cx  - on azithromycin and ceftriaxone at present time  - broaden coverage if worsening fever or leukocytosis develops  - CT chest reviewed with chronic cystic bronchiectasis of R lung with volume loss and nodular opacities of left lung which may be infectious      3. PSYCH  - cont home psych meds    4. GEN  - full code  - family updated    Critical Care Time: 35 min .
Admitted for Respiratory Failure, with Acute COPD Exacerbation and PNA

## 2019-05-21 NOTE — PROGRESS NOTE ADULT - PROBLEM SELECTOR PLAN 4
O2 support  Monitoring

## 2019-05-21 NOTE — PROGRESS NOTE ADULT - PROBLEM SELECTOR PROBLEM 1
Chronic obstructive pulmonary disease with acute exacerbation

## 2019-05-21 NOTE — CHART NOTE - NSCHARTNOTEFT_GEN_A_CORE
Factors impacting intake: [ ] none [ ] nausea  [ ] vomiting [ ] diarrhea [ ] constipation  [ ]chewing problems [ ] swallowing issues  [ ] other:     Diet Prescription: Diet, Regular:   Nora (05-14-19 @ 11:17)    Intake:     Current Weight:   % Weight Change    Pertinent Medications: MEDICATIONS  (STANDING):  ALBUTerol/ipratropium for Nebulization 3 milliLiter(s) Nebulizer every 6 hours  ARIPiprazole 10 milliGRAM(s) Oral <User Schedule>  aspirin enteric coated 81 milliGRAM(s) Oral daily  azithromycin  IVPB 500 milliGRAM(s) IV Intermittent every 24 hours  benztropine 2 milliGRAM(s) Oral two times a day  cefTRIAXone   IVPB 1 Gram(s) IV Intermittent every 24 hours  cefTRIAXone   IVPB      chlorhexidine 4% Liquid 1 Application(s) Topical <User Schedule>  diVALproex  milliGRAM(s) Oral two times a day  enoxaparin Injectable 40 milliGRAM(s) SubCutaneous daily  methylPREDNISolone sodium succinate Injectable 20 milliGRAM(s) IV Push every 12 hours  OLANZapine 10 milliGRAM(s) Oral two times a day  pantoprazole    Tablet 40 milliGRAM(s) Oral before breakfast  sertraline 100 milliGRAM(s) Oral every 12 hours  sodium chloride 3%  Inhalation 3 milliLiter(s) Inhalation every 8 hours  tiotropium 18 MICROgram(s) Capsule 1 Capsule(s) Inhalation daily    MEDICATIONS  (PRN):  acetaminophen   Tablet .. 650 milliGRAM(s) Oral every 6 hours PRN Moderate Pain (4 - 6)    Pertinent Labs: 05-21 Na138 mmol/L Glu 128 mg/dL<H> K+ 4.4 mmol/L Cr  0.65 mg/dL BUN 23 mg/dL 05-16 Phos 3.8 mg/dL     CAPILLARY BLOOD GLUCOSE        Skin:     Estimated Needs:   [ ] no change since previous assessment  [ ] recalculated:     Previous Nutrition Diagnosis:         Nutrition Diagnosis is [ ] ongoing  [ ] resolved [ ] not applicable       New Nutrition Diagnosis: [ ] not applicable       Interventions:   Recommend  [ ] Change Diet To:  [ ] Nutrition Supplement  [ ] Nutrition Support  [ ] Other:     Monitoring and Evaluation:   [ ] PO intake [ x ] Tolerance to diet prescription [ x ] weights [ x ] labs[ x ] follow up per protocol  [ ] other: Assessment:   Pt adm c lung disease, c respiratory failure, acute COPD exacerbation, chronic respiratory failure, pneumonia, schizophrenia,     Factors impacting intake: [ ] none [ ] nausea  [ ] vomiting [ ] diarrhea [ ] constipation  [ ]chewing problems [ ] swallowing issues  [ x] other: depressed appetite, dislikes Halal foods, pt will eat fish,  eggs & dairy, family also has been bringing food to encourage oral intake     Diet Prescription: Diet, Regular:   Halal (05-14-19 @ 11:17)    Intake: overall intake <75% of meals, willing to try Ensure Enlive    Current Weight: 05/21/19,  79.1 kg, 05/14/19, 81.8 kg, c wt. loss of 2.7 kg  % Weight Change: 3.3 %      Nutrition focused physical exam conducted; Subcutaneous fat Exam; 1+ generalized edema noted [ WNL  ]  Orbital fat pads region,  [ WNL  ]Buccal fat region,  [ WNL  ]triceps region, [ WNL  ]ribs region.  Muscle Exam; [ WNL   ]temples region, [ WNL  ]clavicle region, [ WNL   ]shoulder region, [ WNL  ]Scapula region, [ WNL   ]Interosseous region, [ WNL  ]thigh region, [ WNL ]Calf region    Pertinent Medications: MEDICATIONS  (STANDING):  ALBUTerol/ipratropium for Nebulization 3 milliLiter(s) Nebulizer every 6 hours  ARIPiprazole 10 milliGRAM(s) Oral <User Schedule>  aspirin enteric coated 81 milliGRAM(s) Oral daily  azithromycin  IVPB 500 milliGRAM(s) IV Intermittent every 24 hours  benztropine 2 milliGRAM(s) Oral two times a day  cefTRIAXone   IVPB 1 Gram(s) IV Intermittent every 24 hours  cefTRIAXone   IVPB      chlorhexidine 4% Liquid 1 Application(s) Topical <User Schedule>  diVALproex  milliGRAM(s) Oral two times a day  enoxaparin Injectable 40 milliGRAM(s) SubCutaneous daily  methylPREDNISolone sodium succinate Injectable 20 milliGRAM(s) IV Push every 12 hours  OLANZapine 10 milliGRAM(s) Oral two times a day  pantoprazole    Tablet 40 milliGRAM(s) Oral before breakfast  sertraline 100 milliGRAM(s) Oral every 12 hours  sodium chloride 3%  Inhalation 3 milliLiter(s) Inhalation every 8 hours  tiotropium 18 MICROgram(s) Capsule 1 Capsule(s) Inhalation daily    MEDICATIONS  (PRN):  acetaminophen   Tablet .. 650 milliGRAM(s) Oral every 6 hours PRN Moderate Pain (4 - 6)    Pertinent Labs: 05-21 Na138 mmol/L Glu 128 mg/dL<H> K+ 4.4 mmol/L Cr  0.65 mg/dL BUN 23 mg/dL 05-16 Phos 3.8 mg/dL     CAPILLARY BLOOD GLUCOSE        Skin: WNL     Estimated Needs:   [x ] no change since previous assessment (05/14/19)  [ ] recalculated:     Previous Nutrition Diagnosis:   · Nutrition Diagnostic Terminology #1: Energy Balance  · Energy Balance: Inadequate energy intake  · Etiology: Decreased ability to consume sufficient energy  · Signs/Symptoms: NPO x 1 day & increased SOB x 2 weeks  · Nutrition Intervention: Meals and Snack  · Meals and Snacks: General/healthful diet; Other (specify); Continue Regular/Halal diet    · Goal/Expected Outcome: Pt to consume >75% meals & maintain stable wt; not met      Nutrition Diagnosis is [x ] ongoing  [ ] resolved [ ] not applicable       New Nutrition Diagnosis: [ x] not applicable       Interventions:   Recommend  [ x] Change Diet To: Low sodium, Pesco-Vegetarian  + Ensure Enlive 1 x /day=375 calories, 20 grams protein c dinner meal  [ ] Nutrition Supplement  [ ] Nutrition Support  [ ] Other:     Monitoring and Evaluation:   [ x] PO intake [ x ] Tolerance to diet prescription [ x ] weights [ x ] labs[ x ] follow up per protocol  [ ] other:

## 2019-05-21 NOTE — PROGRESS NOTE ADULT - PROBLEM SELECTOR PLAN 5
Transferred to CCU for monitoring  Placed on AVAPS  Improved transferred by to floor.
Transferred to CCU for monitoring  Placed on AVAPS
Transferred to CCU for monitoring  Placed on AVAPS  Improved transferred by to floor.

## 2019-05-21 NOTE — PROGRESS NOTE ADULT - ATTENDING COMMENTS
Continue current care and treatment.
Pulmonary following   Continue current care and treatment.
Continue current care and treatment.
Continue current care and treatment.
Management per CCU.
Pulmonary following;  Will reduce steroids continue nebulizer.  O2 with nocturnal AVAP.  Chest Vest as needed.  PT  Continue current care and treatment.
Pulmonary following; Continue nocturnal and PRN NIV with nasal O2 during day.  On hypertonic saline and chest vest to mobilize secretions.  Continue antibiotics and bronchodilators.  Slow steroid taper.    Continue current care and treatment.

## 2019-05-21 NOTE — PROGRESS NOTE ADULT - PROBLEM SELECTOR PLAN 1
Pulmonary following;    IV Steroids  Duoneb  IVF
Pulmonary following;    Will reduce steroids, continue nebulizer.  O2 with nocturnal AVAP.  On chest vest every 8 hours.  IV Steroids  Duoneb  IVF
Pulmonary consult noted; Obtain ABG for acid  base and oxygenation status.  Supplemental O2 with nocturnal NIV.  Continue nebulizer and steroids.  Procalcitonin level.  IV Steroids  Duoneb  IVF
Pulmonary following;    IV Steroids  Duoneb  IVF

## 2019-05-21 NOTE — PROGRESS NOTE ADULT - PROBLEM SELECTOR PROBLEM 4
Chronic respiratory failure with hypoxia and hypercapnia

## 2019-05-21 NOTE — PROGRESS NOTE ADULT - PROBLEM SELECTOR PLAN 3
Continue psych meds.

## 2019-05-21 NOTE — PROGRESS NOTE ADULT - PROBLEM SELECTOR PROBLEM 5
Acute on chronic respiratory failure with hypercapnia

## 2019-05-21 NOTE — PROGRESS NOTE ADULT - PROBLEM SELECTOR PROBLEM 2
Pneumonia due to other aerobic gram-negative bacteria

## 2019-05-21 NOTE — PROGRESS NOTE ADULT - PROBLEM SELECTOR PLAN 2
IV Zosyn, Rocephin  Chest CT noted.
IV Zosyn, Rocephin  Chest CT noted. Cystic Bronchiectasis
IV Zosyn, Rocephin  Chest CT noted.
IV Zosyn, Rocephin  Chest CT noted. Cystic Bronchiectasis

## 2019-05-22 ENCOUNTER — TRANSCRIPTION ENCOUNTER (OUTPATIENT)
Age: 59
End: 2019-05-22

## 2019-05-22 VITALS
HEART RATE: 103 BPM | OXYGEN SATURATION: 97 % | SYSTOLIC BLOOD PRESSURE: 125 MMHG | RESPIRATION RATE: 98 BRPM | TEMPERATURE: 98 F | DIASTOLIC BLOOD PRESSURE: 78 MMHG

## 2019-05-22 LAB
HCT VFR BLD CALC: 37.1 % — SIGNIFICANT CHANGE UP (ref 34.5–45)
HGB BLD-MCNC: 11.1 G/DL — LOW (ref 11.5–15.5)
MCHC RBC-ENTMCNC: 27.8 PG — SIGNIFICANT CHANGE UP (ref 27–34)
MCHC RBC-ENTMCNC: 29.9 GM/DL — LOW (ref 32–36)
MCV RBC AUTO: 92.8 FL — SIGNIFICANT CHANGE UP (ref 80–100)
NRBC # BLD: 0 /100 WBCS — SIGNIFICANT CHANGE UP (ref 0–0)
PLATELET # BLD AUTO: 199 K/UL — SIGNIFICANT CHANGE UP (ref 150–400)
RBC # BLD: 4 M/UL — SIGNIFICANT CHANGE UP (ref 3.8–5.2)
RBC # FLD: 17.4 % — HIGH (ref 10.3–14.5)
WBC # BLD: 12.78 K/UL — HIGH (ref 3.8–10.5)
WBC # FLD AUTO: 12.78 K/UL — HIGH (ref 3.8–10.5)

## 2019-05-22 RX ORDER — DIVALPROEX SODIUM 500 MG/1
1 TABLET, DELAYED RELEASE ORAL
Qty: 0 | Refills: 0 | DISCHARGE
Start: 2019-05-22

## 2019-05-22 RX ORDER — ARIPIPRAZOLE 15 MG/1
1 TABLET ORAL
Qty: 0 | Refills: 0 | DISCHARGE
Start: 2019-05-22

## 2019-05-22 RX ORDER — ASPIRIN/CALCIUM CARB/MAGNESIUM 324 MG
1 TABLET ORAL
Qty: 0 | Refills: 0 | DISCHARGE
Start: 2019-05-22

## 2019-05-22 RX ORDER — OLANZAPINE 15 MG/1
1 TABLET, FILM COATED ORAL
Qty: 0 | Refills: 0 | DISCHARGE
Start: 2019-05-22

## 2019-05-22 RX ORDER — SERTRALINE 25 MG/1
1 TABLET, FILM COATED ORAL
Qty: 0 | Refills: 0 | DISCHARGE
Start: 2019-05-22

## 2019-05-22 RX ORDER — PANTOPRAZOLE SODIUM 20 MG/1
1 TABLET, DELAYED RELEASE ORAL
Qty: 0 | Refills: 0 | DISCHARGE
Start: 2019-05-22

## 2019-05-22 RX ORDER — DIVALPROEX SODIUM 500 MG/1
1 TABLET, DELAYED RELEASE ORAL
Qty: 0 | Refills: 0 | DISCHARGE

## 2019-05-22 RX ORDER — CEFPODOXIME PROXETIL 100 MG
1 TABLET ORAL
Qty: 10 | Refills: 0
Start: 2019-05-22 | End: 2019-05-26

## 2019-05-22 RX ORDER — BENZTROPINE MESYLATE 1 MG
1 TABLET ORAL
Qty: 0 | Refills: 0 | DISCHARGE

## 2019-05-22 RX ORDER — BENZTROPINE MESYLATE 1 MG
1 TABLET ORAL
Qty: 0 | Refills: 0 | DISCHARGE
Start: 2019-05-22

## 2019-05-22 RX ORDER — ARIPIPRAZOLE 15 MG/1
1 TABLET ORAL
Qty: 0 | Refills: 0 | DISCHARGE

## 2019-05-22 RX ORDER — SERTRALINE 25 MG/1
1 TABLET, FILM COATED ORAL
Qty: 0 | Refills: 0 | DISCHARGE

## 2019-05-22 RX ORDER — LACTOBACILLUS ACIDOPHILUS 100MM CELL
1 CAPSULE ORAL
Qty: 30 | Refills: 0
Start: 2019-05-22 | End: 2019-06-20

## 2019-05-22 RX ADMIN — TIOTROPIUM BROMIDE 1 CAPSULE(S): 18 CAPSULE ORAL; RESPIRATORY (INHALATION) at 12:11

## 2019-05-22 RX ADMIN — Medication 20 MILLIGRAM(S): at 17:05

## 2019-05-22 RX ADMIN — AZITHROMYCIN 255 MILLIGRAM(S): 500 TABLET, FILM COATED ORAL at 05:13

## 2019-05-22 RX ADMIN — DIVALPROEX SODIUM 500 MILLIGRAM(S): 500 TABLET, DELAYED RELEASE ORAL at 05:14

## 2019-05-22 RX ADMIN — ENOXAPARIN SODIUM 40 MILLIGRAM(S): 100 INJECTION SUBCUTANEOUS at 12:10

## 2019-05-22 RX ADMIN — OLANZAPINE 10 MILLIGRAM(S): 15 TABLET, FILM COATED ORAL at 05:14

## 2019-05-22 RX ADMIN — Medication 2 MILLIGRAM(S): at 05:14

## 2019-05-22 RX ADMIN — ARIPIPRAZOLE 10 MILLIGRAM(S): 15 TABLET ORAL at 05:14

## 2019-05-22 RX ADMIN — SERTRALINE 100 MILLIGRAM(S): 25 TABLET, FILM COATED ORAL at 17:03

## 2019-05-22 RX ADMIN — PANTOPRAZOLE SODIUM 40 MILLIGRAM(S): 20 TABLET, DELAYED RELEASE ORAL at 08:00

## 2019-05-22 RX ADMIN — CEFTRIAXONE 100 GRAM(S): 500 INJECTION, POWDER, FOR SOLUTION INTRAMUSCULAR; INTRAVENOUS at 14:55

## 2019-05-22 RX ADMIN — SODIUM CHLORIDE 3 MILLILITER(S): 9 INJECTION INTRAMUSCULAR; INTRAVENOUS; SUBCUTANEOUS at 14:01

## 2019-05-22 RX ADMIN — Medication 3 MILLILITER(S): at 05:00

## 2019-05-22 RX ADMIN — SODIUM CHLORIDE 3 MILLILITER(S): 9 INJECTION INTRAMUSCULAR; INTRAVENOUS; SUBCUTANEOUS at 05:02

## 2019-05-22 RX ADMIN — OLANZAPINE 10 MILLIGRAM(S): 15 TABLET, FILM COATED ORAL at 17:04

## 2019-05-22 RX ADMIN — Medication 3 MILLILITER(S): at 11:18

## 2019-05-22 RX ADMIN — DIVALPROEX SODIUM 500 MILLIGRAM(S): 500 TABLET, DELAYED RELEASE ORAL at 17:05

## 2019-05-22 RX ADMIN — Medication 20 MILLIGRAM(S): at 05:13

## 2019-05-22 RX ADMIN — Medication 3 MILLILITER(S): at 17:01

## 2019-05-22 RX ADMIN — SERTRALINE 100 MILLIGRAM(S): 25 TABLET, FILM COATED ORAL at 05:14

## 2019-05-22 RX ADMIN — Medication 3 MILLILITER(S): at 00:32

## 2019-05-22 RX ADMIN — Medication 2 MILLIGRAM(S): at 17:06

## 2019-05-22 RX ADMIN — Medication 81 MILLIGRAM(S): at 12:10

## 2019-05-22 NOTE — DISCHARGE NOTE PROVIDER - HOSPITAL COURSE
57yo, F with  COPD, Bronchiectasis, TB, Right Lung Collapse,  Chronic hypoxic hypercarbic Respiratory failure on home O2 and NIV/ Trilogy, Schizophrenia,      Presented  with SOB increasing for 2 weeks, associated with nonproductive cough, fever    Transferred to ICU for worsening hypercarbia. a/w     Acute on chronic hypoxic hypercarbic Respiratory failure.    Acute COPD Exacerbation.    Infected Bronchiectasis/Pneumonia.    Anemia.    History of TB with Right lung collapse.    Schizophrenia.        Pulmonary status is close to base line.    Taper steroids, continue nebulizer.    O2 with nocturnal AVAP.    On chest vest every 8 hours.    Discharge planning.    Has O2 and Trilogy at home.

## 2019-05-22 NOTE — PROGRESS NOTE ADULT - REASON FOR ADMISSION
Cough and difficulty breathing.

## 2019-05-22 NOTE — DISCHARGE NOTE PROVIDER - CARE PROVIDER_API CALL
Tammie Enamorado)  Medicine  [address], [address]  [place] [address]  Phone: [phone]  Fax: [phone]  Follow Up Time: 1 week

## 2019-05-22 NOTE — PROGRESS NOTE ADULT - PROVIDER SPECIALTY LIST ADULT
Critical Care
Critical Care
Internal Medicine
Pulmonology
Internal Medicine
Internal Medicine

## 2019-05-22 NOTE — DISCHARGE NOTE PROVIDER - NSDCCPCAREPLAN_GEN_ALL_CORE_FT
PRINCIPAL DISCHARGE DIAGNOSIS  Diagnosis: Acute on chronic respiratory failure with hypercapnia  Assessment and Plan of Treatment: back to baseline. Continue home medication        SECONDARY DISCHARGE DIAGNOSES  Diagnosis: Schizophrenia, unspecified type  Assessment and Plan of Treatment: Continue home medication

## 2019-05-22 NOTE — PROGRESS NOTE ADULT - SUBJECTIVE AND OBJECTIVE BOX
HPI:  58F PMH schizophrenia, autism, COPD, chronic hypoxic and hypercapnic respiratory failure on home O2 and BiPAP,  h/o TB w/ secondary R lung bronchiectasis presents for 2 weeks of chest congestion, SOB, cough, wheeze. Admitted to medical service for COPD exacerbation with PNA. Course with acute on chronic hypercarbic respiratory failure while on BiPAP. 7.24/83/123/35/97%. Transferred to ICU for respiratory monitoring. DX: acute on chronic hypercarbic respiratory failure, chronic hypoxic respiratory failure, acute exacerbation of bronchiectasis, COPD exacerbation, PNA.         24 hr events:  doing well with AVAPS  still with difficulty mobilizing secretions and expectorating  tolerating nasal cannula during the day  eating and feeding self      ## ROS:  CONSTITUTIONAL: No fever, no chills  EYES: No visual disturbances  ENMT:  No sinus or throat pain  NECK: No pain or stiffness  RESPIRATORY: no shortness of breath, + cough but unable to cough up phlegm   CARDIOVASCULAR: No chest pain, no palpitations  GASTROINTESTINAL: No abdominal or epigastric pain. No nausea, no vomiting  GENITOURINARY: No dysuria  NEUROLOGICAL: No headaches  SKIN: No itching, no rashes  MUSCULOSKELETAL: No joint pain or swelling; No muscle, back, or extremity pain  PSYCHIATRIC: hx fo schizophrenia      ## Labs:  CBC:                        9.9    11.21 )-----------( 240      ( 15 May 2019 04:12 )             34.0     Chem:  05-15    139  |  100  |  18  ----------------------------<  162<H>  4.2   |  35<H>  |  0.67    Ca    8.6      15 May 2019 04:12  Phos  2.8     05-15  Mg     2.2     05-15    TPro  7.5  /  Alb  2.8<L>  /  TBili  0.3  / AST  7<L>  /  ALT  8<L>  /  AlkPhos  78  05-14        ## Imaging:  no new imaging    ## Medications:  azithromycin  IVPB 500 milliGRAM(s) IV Intermittent every 24 hours  cefTRIAXone   IVPB 1 Gram(s) IV Intermittent every 24 hours          ALBUTerol/ipratropium for Nebulization 3 milliLiter(s) Nebulizer every 4 hours  sodium chloride 3%  Inhalation 3 milliLiter(s) Inhalation every 8 hours  tiotropium 18 MICROgram(s) Capsule 1 Capsule(s) Inhalation daily    methylPREDNISolone sodium succinate Injectable 40 milliGRAM(s) IV Push every 12 hours    aspirin enteric coated 81 milliGRAM(s) Oral daily  enoxaparin Injectable 40 milliGRAM(s) SubCutaneous daily    pantoprazole    Tablet 40 milliGRAM(s) Oral before breakfast    acetaminophen   Tablet .. 650 milliGRAM(s) Oral every 6 hours PRN  ARIPiprazole 10 milliGRAM(s) Oral <User Schedule>  benztropine 2 milliGRAM(s) Oral two times a day  diVALproex  milliGRAM(s) Oral two times a day  OLANZapine 10 milliGRAM(s) Oral two times a day  sertraline 100 milliGRAM(s) Oral every 12 hours      ## Vitals:  T(C): 36.8 (05-15-19 @ 11:50), Max: 36.9 (05-14-19 @ 19:15)  HR: 95 (05-15-19 @ 11:50) (64 - 98)  BP: 122/63 (05-15-19 @ 11:00) (96/54 - 142/72)  BP(mean): 77 (05-15-19 @ 11:00) (64 - 97)  RR: 24 (05-15-19 @ 11:50) (15 - 33)  SpO2: 97% (05-15-19 @ 11:50) (80% - 100%)    ABG: ABG - ( 15 May 2019 10:22 ) - on nasal cannula  pH, Arterial:  pH, Blood: 7.35  /  pCO2: 67    /  pO2: 114   / HCO3: 36    / Base Excess: 8.4   /  SaO2: 97                    05-14 @ 07:01  -  05-15 @ 07:00  --------------------------------------------------------  IN: 1190 mL / OUT: 400 mL / NET: 790 mL    05-15 @ 07:01  -  05-15 @ 13:35  --------------------------------------------------------  IN: 220 mL / OUT: 450 mL / NET: -230 mL          ## P/E:  Gen: sitting in chair,  no apparent distress  HEENT: PERRL, EOMI  Resp: bilateral rhonchi and course breath sounds  CVS: RRR  Abd: soft NT/ND +BS  Ext: no c/c/e  Neuro: awake, responsive, follows commands    CENTRAL LINE: [ ] YES [x] NO  LOCATION:   DATE INSERTED:  REMOVE: [ ] YES [ ] NO      COELHO: [ ] YES [x] NO    DATE INSERTED:  REMOVE:  [ ] YES [ ] NO      A-LINE:  [ ] YES [x] NO  LOCATION:   DATE INSERTED:  REMOVE:  [ ] YES [ ] NO  EXPLAIN:    GLOBAL ISSUE/BEST PRACTICE:  Analgesia: n/a  Sedation: n/a  HOB elevation: yes  Stress ulcer prophylaxis: n/a  VTE prophylaxis: lovenox  Oral Care: n/a  Glycemic control: n/a  Nutrition: po diet (halal)     CODE STATUS: [x] full code  [ ] DNR  [ ] DNI  [ ] MOLST  Goals of care discussion: [x] yes
HPI:  58F PMH schizophrenia, autism, COPD, chronic hypoxic and hypercapnic respiratory failure on home O2 and BiPAP,  h/o TB w/ secondary R lung bronchiectasis presents for 2 weeks of chest congestion, SOB, cough, wheeze. Admitted to medical service for COPD exacerbation with PNA. Course with acute on chronic hypercarbic respiratory failure while on BiPAP. 7.24/83/123/35/97%. Transferred to ICU for respiratory monitoring. DX: acute on chronic hypercarbic respiratory failure, chronic hypoxic respiratory failure, acute exacerbation of bronchiectasis, COPD exacerbation, PNA.       24 hr events:  on bipap blood gas did not significantly change  switched to AVAPS yesterday night with improvement in gas  weaned off non invasive ventilation this afternoon  tolerating nasal cannula while off AVAPS    ## ROS:  [x] limited due to non invasive ventilation   + sob  + cough    ## Labs:  CBC:                        10.2   13.33 )-----------( 217      ( 14 May 2019 04:40 )             35.6     Chem:  05-14    140  |  100  |  15  ----------------------------<  149<H>  4.4   |  36<H>  |  0.62    Ca    8.6      14 May 2019 08:44  Phos  2.5     05-14  Mg     2.5     05-14    TPro  7.5  /  Alb  2.8<L>  /  TBili  0.3  /  DBili  x   /  AST  7<L>  /  ALT  8<L>  /  AlkPhos  78  05-14      ## Imaging:  no new  imaging    ## Medications:  azithromycin  IVPB 500 milliGRAM(s) IV Intermittent every 24 hours  cefTRIAXone   IVPB 1 Gram(s) IV Intermittent every 24 hours      ALBUTerol/ipratropium for Nebulization 3 milliLiter(s) Nebulizer every 4 hours  tiotropium 18 MICROgram(s) Capsule 1 Capsule(s) Inhalation daily    methylPREDNISolone sodium succinate Injectable 40 milliGRAM(s) IV Push every 8 hours    aspirin enteric coated 81 milliGRAM(s) Oral daily  enoxaparin Injectable 40 milliGRAM(s) SubCutaneous daily    pantoprazole    Tablet 40 milliGRAM(s) Oral before breakfast    acetaminophen   Tablet .. 650 milliGRAM(s) Oral every 6 hours PRN  ARIPiprazole 10 milliGRAM(s) Oral <User Schedule>  benztropine 2 milliGRAM(s) Oral two times a day  diVALproex  milliGRAM(s) Oral two times a day  OLANZapine 10 milliGRAM(s) Oral two times a day  sertraline 100 milliGRAM(s) Oral every 12 hours      ## Vitals:  T(C): 36.9 (05-14-19 @ 19:15), Max: 36.9 (05-14-19 @ 19:15)  HR: 72 (05-14-19 @ 21:26) (51 - 93)  BP: 121/53 (05-14-19 @ 20:00) (98/59 - 129/69)  BP(mean): 71 (05-14-19 @ 20:00) (66 - 88)  RR: 26 (05-14-19 @ 20:00) (13 - 34)  SpO2: 94% (05-14-19 @ 21:26) (93% - 100%)    Vent:   ABG: ABG - ( 14 May 2019 06:22 )  pH, Arterial:   pH, Blood: 7.41  /  pCO2: 60    /  pO2: 88    / HCO3: 37    / Base Excess: 10.8  /  SaO2: 96              05-13 @ 07:01  -  05-14 @ 07:00  --------------------------------------------------------  IN: 1660 mL / OUT: 900 mL / NET: 760 mL    05-14 @ 07:01  -  05-14 @ 22:13  --------------------------------------------------------  IN: 560 mL / OUT: 400 mL / NET: 160 mL          ## P/E:  Gen: awake, alert, lying comfortably in bed in no apparent distress  HEENT: PERRL, sclera white  Resp: CTA B/L   CVS: S1S2 RRR  Abd: soft NT/ND +BS  Ext: no c/c/e  Neuro: A&Ox2, moving all extremities    CENTRAL LINE: [ ] YES [x] NO  LOCATION:   DATE INSERTED:  REMOVE: [ ] YES [ ] NO      COELHO: [ ] YES [x] NO    DATE INSERTED:  REMOVE:  [ ] YES [ ] NO      A-LINE:  [ ] YES [x] NO  LOCATION:   DATE INSERTED:  REMOVE:  [ ] YES [ ] NO  EXPLAIN:    GLOBAL ISSUE/BEST PRACTICE:  Analgesia: n/a  Sedation: n/a  HOB elevation: yes  Stress ulcer prophylaxis: protonix  VTE prophylaxis: lovenox  Oral Care: n/a  Glycemic control: n/a  Nutrition: halal diet    CODE STATUS: [x] full code  [ ] DNR  [ ] DNI  [ ] MOLST  Goals of care discussion: [x ] yes
INTERVAL HPI:  57yo, F with  COPD, Bronchiectasis, TB(treated), Right Lung Collapse(required drainage),  Chronic hypoxic hypercarbic Respiratory failure on home O2 and NIV/ Trilogy, Schizophrenia,    Presented  with SOB increasing for 2 weeks, associated with nonproductive cough, fever.  Noted CP.  Denies CP, palpitation, n/v/d, fever, chills, weakness, abdominal pain, dysuria. Not able to provide more details.  05/13/19: Transferred to ICU for worsening hypercarbia.  05/15/19:  Out of ICU.    OVERNIGHT EVENTS:  Awake and comfortable.    Vital Signs Last 24 Hrs  T(C): 36.9 (22 May 2019 12:57), Max: 37.2 (21 May 2019 23:49)  T(F): 98.4 (22 May 2019 12:57), Max: 99 (21 May 2019 23:49)  HR: 73 (22 May 2019 14:04) (70 - 104)  BP: 115/64 (22 May 2019 12:57) (109/62 - 115/64)  BP(mean): --  RR: 17 (22 May 2019 12:57) (17 - 18)  SpO2: 96% (22 May 2019 14:04) (94% - 100%)    PHYSICAL EXAM:  GEN:         Awake, responsive and comfortable.  HEENT:    Normal.    RESP:       no wheezing.  CVS:          Regular rate and rhythm.   ABD:         Soft, non-tender, non-distended;     MEDICATIONS  (STANDING):  ALBUTerol/ipratropium for Nebulization 3 milliLiter(s) Nebulizer every 6 hours  ARIPiprazole 10 milliGRAM(s) Oral <User Schedule>  aspirin enteric coated 81 milliGRAM(s) Oral daily  azithromycin  IVPB 500 milliGRAM(s) IV Intermittent every 24 hours  benztropine 2 milliGRAM(s) Oral two times a day  cefTRIAXone   IVPB 1 Gram(s) IV Intermittent every 24 hours  cefTRIAXone   IVPB      chlorhexidine 4% Liquid 1 Application(s) Topical <User Schedule>  diVALproex  milliGRAM(s) Oral two times a day  enoxaparin Injectable 40 milliGRAM(s) SubCutaneous daily  methylPREDNISolone sodium succinate Injectable 20 milliGRAM(s) IV Push every 12 hours  OLANZapine 10 milliGRAM(s) Oral two times a day  pantoprazole    Tablet 40 milliGRAM(s) Oral before breakfast  sertraline 100 milliGRAM(s) Oral every 12 hours  sodium chloride 3%  Inhalation 3 milliLiter(s) Inhalation every 8 hours  tiotropium 18 MICROgram(s) Capsule 1 Capsule(s) Inhalation daily    MEDICATIONS  (PRN):  acetaminophen   Tablet .. 650 milliGRAM(s) Oral every 6 hours PRN Moderate Pain (4 - 6)    LABS:                        11.1   12.78 )-----------( 199      ( 22 May 2019 08:27 )             37.1     05-21    138  |  98  |  23  ----------------------------<  128<H>  4.4   |  36<H>  |  0.65    Ca    8.6      21 May 2019 08:16    ASSESSMENT AND PLAN:  ·	Acute on chronic hypoxic hypercarbic Respiratory failure.  ·	Acute COPD Exacerbation.  ·	Infected Bronchiectasis/Pneumonia.  ·	Anemia.  ·	History of TB with Right lung collapse.  ·	Schizophrenia.    Pulmonary status is close to base line.  Taper steroids, continue nebulizer.  On home O2 and nocturnal CPAP  For discharge home.
INTERVAL HPI:  57yo, F with  COPD, Bronchiectasis, TB, Right Lung Collapse,  Chronic hypoxic hypercarbic Respiratory failure on home O2 and NIV/ Trilogy, Schizophrenia,    Presented  with SOB increasing for 2 weeks, associated with nonproductive cough, fever.  Noted CP.  Denies CP, palpitation, n/v/d, fever, chills, weakness, abdominal pain, dysuria. Not able to provide more details.  05/13/19: Transferred to ICU for worsening hypercarbia.    OVERNIGHT EVENTS:  In ICU, on Nasal O2.    Vital Signs Last 24 Hrs  T(C): 36.4 (14 May 2019 08:00), Max: 36.4 (14 May 2019 08:00)  T(F): 97.6 (14 May 2019 08:00), Max: 97.6 (14 May 2019 08:00)  HR: 74 (14 May 2019 13:00) (51 - 93)  BP: 124/64 (14 May 2019 13:00) (93/53 - 129/69)  BP(mean): 74 (14 May 2019 13:00) (63 - 83)  RR: 14 (14 May 2019 13:00) (13 - 34)  SpO2: 100% (14 May 2019 13:00) (89% - 100%)    PHYSICAL EXAM:  GEN:         Awake, responsive and comfortable.  HEENT:    Normal.    RESP:        no wheezing.  CVS:          Regular rate and rhythm.   ABD:         Soft, non-tender, non-distended;     MEDICATIONS  (STANDING):  ALBUTerol/ipratropium for Nebulization 3 milliLiter(s) Nebulizer every 4 hours  ARIPiprazole 10 milliGRAM(s) Oral <User Schedule>  aspirin enteric coated 81 milliGRAM(s) Oral daily  azithromycin  IVPB 500 milliGRAM(s) IV Intermittent every 24 hours  benztropine 2 milliGRAM(s) Oral two times a day  cefTRIAXone   IVPB 1 Gram(s) IV Intermittent every 24 hours  cefTRIAXone   IVPB      chlorhexidine 4% Liquid 1 Application(s) Topical <User Schedule>  dextrose 5% + sodium chloride 0.45%. 1000 milliLiter(s) (70 mL/Hr) IV Continuous <Continuous>  diVALproex  milliGRAM(s) Oral two times a day  enoxaparin Injectable 40 milliGRAM(s) SubCutaneous daily  methylPREDNISolone sodium succinate Injectable 40 milliGRAM(s) IV Push every 8 hours  OLANZapine 10 milliGRAM(s) Oral two times a day  pantoprazole    Tablet 40 milliGRAM(s) Oral before breakfast  sertraline 100 milliGRAM(s) Oral every 12 hours  tiotropium 18 MICROgram(s) Capsule 1 Capsule(s) Inhalation daily    MEDICATIONS  (PRN):  acetaminophen   Tablet .. 650 milliGRAM(s) Oral every 6 hours PRN Moderate Pain (4 - 6)    LABS:                        10.2   13.33 )-----------( 217      ( 14 May 2019 04:40 )             35.6     05-14    140  |  100  |  15  ----------------------------<  149<H>  4.4   |  36<H>  |  0.62    Ca    8.6      14 May 2019 08:44  Phos  2.5     05-14  Mg     2.5     05-14    TPro  7.5  /  Alb  2.8<L>  /  TBili  0.3  /  DBili  x   /  AST  7<L>  /  ALT  8<L>  /  AlkPhos  78  05-14    05-14 @ 06:22  pH: 7.41  pCO2: 60  pO2: 88  SaO2: 96  05-14 @ 01:07  pH: 7.35  pCO2: 70  pO2: 69  SaO2: 91  05-13 @ 23:01  pH: 7.32  pCO2: 74  pO2: 69  SaO2: 91  05-13 @ 21:57  pH: 7.29  pCO2: 80  pO2: 94  SaO2: 95  05-13 @ 18:48  pH: 7.34  pCO2: 66  pO2: 118  SaO2: 97  05-13 @ 15:06  pH: 7.24  pCO2: 83  pO2: 123  SaO2: 97  05-13 @ 12:27  pH: 7.27  pCO2: 74  pO2: 67  SaO2: 89  05-13 @ 10:01  pH: 7.26  pCO2: 71  pO2: 105  SaO2: 96    ASSESSMENT AND PLAN:  ·	Acute on chronic hypoxic hypercarbic Respiratory failure.  ·	Acute COPD Exacerbation.  ·	Infected Bronchiectasis/Pneumonia.  ·	Anemia.  ·	History of TB with Right lung collapse.  ·	Schizophrenia.    Improved with BIPAP upport.  Continue O2, as needed BIPAP, antibiotics and nebulizer.
INTERVAL HPI:  57yo, F with  COPD, Bronchiectasis, TB, Right Lung Collapse,  Chronic hypoxic hypercarbic Respiratory failure on home O2 and NIV/ Trilogy, Schizophrenia,    Presented  with SOB increasing for 2 weeks, associated with nonproductive cough, fever.  Noted CP.  Denies CP, palpitation, n/v/d, fever, chills, weakness, abdominal pain, dysuria. Not able to provide more details.  05/13/19: Transferred to ICU for worsening hypercarbia.  05/15/19:  Out of ICU.    OVERNIGHT EVENTS:  Awake and responsive.    Vital Signs Last 24 Hrs  T(C): 36.4 (15 May 2019 15:14), Max: 36.9 (14 May 2019 19:15)  T(F): 97.6 (15 May 2019 15:14), Max: 98.4 (14 May 2019 19:15)  HR: 81 (15 May 2019 16:00) (64 - 99)  BP: 122/64 (15 May 2019 16:00) (89/61 - 142/72)  BP(mean): 79 (15 May 2019 16:00) (64 - 97)  RR: 18 (15 May 2019 16:00) (15 - 33)  SpO2: 98% (15 May 2019 16:00) (80% - 100%)    PHYSICAL EXAM:  GEN:         Awake, responsive and comfortable.  HEENT:    Normal.    RESP:        no wheezing.  CVS:          Regular rate and rhythm.   ABD:         Soft, non-tender, non-distended;     MEDICATIONS  (STANDING):  ALBUTerol/ipratropium for Nebulization 3 milliLiter(s) Nebulizer every 4 hours  ARIPiprazole 10 milliGRAM(s) Oral <User Schedule>  aspirin enteric coated 81 milliGRAM(s) Oral daily  azithromycin  IVPB 500 milliGRAM(s) IV Intermittent every 24 hours  benztropine 2 milliGRAM(s) Oral two times a day  cefTRIAXone   IVPB 1 Gram(s) IV Intermittent every 24 hours  cefTRIAXone   IVPB      chlorhexidine 4% Liquid 1 Application(s) Topical <User Schedule>  diVALproex  milliGRAM(s) Oral two times a day  enoxaparin Injectable 40 milliGRAM(s) SubCutaneous daily  methylPREDNISolone sodium succinate Injectable 40 milliGRAM(s) IV Push every 12 hours  OLANZapine 10 milliGRAM(s) Oral two times a day  pantoprazole    Tablet 40 milliGRAM(s) Oral before breakfast  sertraline 100 milliGRAM(s) Oral every 12 hours  sodium chloride 3%  Inhalation 3 milliLiter(s) Inhalation every 8 hours  tiotropium 18 MICROgram(s) Capsule 1 Capsule(s) Inhalation daily    MEDICATIONS  (PRN):  acetaminophen   Tablet .. 650 milliGRAM(s) Oral every 6 hours PRN Moderate Pain (4 - 6)    LABS:                        9.9    11.21 )-----------( 240      ( 15 May 2019 04:12 )             34.0     05-15    139  |  100  |  18  ----------------------------<  162<H>  4.2   |  35<H>  |  0.67    Ca    8.6      15 May 2019 04:12  Phos  2.8     05-15  Mg     2.2     05-15    TPro  7.5  /  Alb  2.8<L>  /  TBili  0.3  /  DBili  x   /  AST  7<L>  /  ALT  8<L>  /  AlkPhos  78  05-14    05-15 @ 10:22  pH: 7.35  pCO2: 67  pO2: 114  SaO2: 97  05-14 @ 06:22  pH: 7.41  pCO2: 60  pO2: 88  SaO2: 96  05-14 @ 01:07  pH: 7.35  pCO2: 70  pO2: 69  SaO2: 91  05-13 @ 23:01  pH: 7.32  pCO2: 74  pO2: 69  SaO2: 91  05-13 @ 21:57  pH: 7.29  pCO2: 80  pO2: 94  SaO2: 95  05-13 @ 18:48  pH: 7.34  pCO2: 66  pO2: 118  SaO2: 97  05-13 @ 15:06  pH: 7.24  pCO2: 83  pO2: 123  SaO2: 97  05-13 @ 12:27  pH: 7.27  pCO2: 74  pO2: 67  SaO2: 89  05-13 @ 10:01  pH: 7.26  pCO2: 71  pO2: 105  SaO2: 96    ASSESSMENT AND PLAN:  ·	Acute on chronic hypoxic hypercarbic Respiratory failure.  ·	Acute COPD Exacerbation.  ·	Infected Bronchiectasis/Pneumonia.  ·	Anemia.  ·	History of TB with Right lung collapse.  ·	Schizophrenia.    Continue O2, nebulizer and steroids.  Nocturnal BIPAP.
INTERVAL HPI:  57yo, F with  COPD, Bronchiectasis, TB, Right Lung Collapse,  Chronic hypoxic hypercarbic Respiratory failure on home O2 and NIV/ Trilogy, Schizophrenia,    Presented  with SOB increasing for 2 weeks, associated with nonproductive cough, fever.  Noted CP.  Denies CP, palpitation, n/v/d, fever, chills, weakness, abdominal pain, dysuria. Not able to provide more details.  05/13/19: Transferred to ICU for worsening hypercarbia.  05/15/19:  Out of ICU.    OVERNIGHT EVENTS:  Awake, hallucinating.    Vital Signs Last 24 Hrs  T(C): 36.3 (21 May 2019 11:25), Max: 36.6 (20 May 2019 17:09)  T(F): 97.3 (21 May 2019 11:25), Max: 97.9 (20 May 2019 17:09)  HR: 60 (21 May 2019 11:30) (60 - 83)  BP: 100/60 (21 May 2019 11:25) (100/60 - 125/63)  BP(mean): --  RR: 17 (21 May 2019 11:25) (16 - 18)  SpO2: 98% (21 May 2019 11:30) (91% - 99%)    PHYSICAL EXAM:  GEN:         Awake, responsive and comfortable.  HEENT:    Normal.    RESP:        no wheezing.  CVS:         Regular rate and rhythm.   ABD:         Soft, non-tender, non-distended;     MEDICATIONS  (STANDING):  ALBUTerol/ipratropium for Nebulization 3 milliLiter(s) Nebulizer every 6 hours  ARIPiprazole 10 milliGRAM(s) Oral <User Schedule>  aspirin enteric coated 81 milliGRAM(s) Oral daily  azithromycin  IVPB 500 milliGRAM(s) IV Intermittent every 24 hours  benztropine 2 milliGRAM(s) Oral two times a day  cefTRIAXone   IVPB 1 Gram(s) IV Intermittent every 24 hours  cefTRIAXone   IVPB      chlorhexidine 4% Liquid 1 Application(s) Topical <User Schedule>  diVALproex  milliGRAM(s) Oral two times a day  enoxaparin Injectable 40 milliGRAM(s) SubCutaneous daily  methylPREDNISolone sodium succinate Injectable 20 milliGRAM(s) IV Push every 12 hours  OLANZapine 10 milliGRAM(s) Oral two times a day  pantoprazole    Tablet 40 milliGRAM(s) Oral before breakfast  sertraline 100 milliGRAM(s) Oral every 12 hours  sodium chloride 3%  Inhalation 3 milliLiter(s) Inhalation every 8 hours  tiotropium 18 MICROgram(s) Capsule 1 Capsule(s) Inhalation daily    MEDICATIONS  (PRN):  acetaminophen   Tablet .. 650 milliGRAM(s) Oral every 6 hours PRN Moderate Pain (4 - 6)    LABS:                        11.3   10.77 )-----------( 202      ( 21 May 2019 08:16 )             38.3     05-21    138  |  98  |  23  ----------------------------<  128<H>  4.4   |  36<H>  |  0.65    Ca    8.6      21 May 2019 08:16    05-15 @ 10:22  pH: 7.35  pCO2: 67  pO2: 114  SaO2: 97    ASSESSMENT AND PLAN:  ·	Acute on chronic hypoxic hypercarbic Respiratory failure.  ·	Acute COPD Exacerbation.  ·	Infected Bronchiectasis/Pneumonia.  ·	Anemia.  ·	History of TB with Right lung collapse.  ·	Schizophrenia.    Pulmonary status is close to base line.  Taper steroids, continue nebulizer.  O2 with nocturnal AVAP.  On chest vest every 8 hours.  Discharge planning.  Has O2 and Trilogy at home.
INTERVAL HPI:  57yo, F with  COPD, Bronchiectasis, TB, Right Lung Collapse,  Chronic hypoxic hypercarbic Respiratory failure on home O2 and NIV/ Trilogy, Schizophrenia,    Presented  with SOB increasing for 2 weeks, associated with nonproductive cough, fever.  Noted CP.  Denies CP, palpitation, n/v/d, fever, chills, weakness, abdominal pain, dysuria. Not able to provide more details.  05/13/19: Transferred to ICU for worsening hypercarbia.  05/15/19:  Out of ICU.    OVERNIGHT EVENTS:  More awake and responsive.    Vital Signs Last 24 Hrs  T(C): 36.7 (18 May 2019 11:56), Max: 36.7 (18 May 2019 11:56)  T(F): 98 (18 May 2019 11:56), Max: 98 (18 May 2019 11:56)  HR: 91 (18 May 2019 12:24) (69 - 909)  BP: 105/68 (18 May 2019 11:56) (105/68 - 149/85)  BP(mean): --  RR: 18 (18 May 2019 11:56) (16 - 18)  SpO2: 95% (18 May 2019 12:24) (92% - 98%)    PHYSICAL EXAM:  GEN:         Awake, responsive and comfortable.  HEENT:    Normal.    RESP:         no wheezing.  CVS:          Regular rate and rhythm.   ABD:         Soft, non-tender, non-distended;     MEDICATIONS  (STANDING):  ALBUTerol/ipratropium for Nebulization 3 milliLiter(s) Nebulizer every 6 hours  ARIPiprazole 10 milliGRAM(s) Oral <User Schedule>  aspirin enteric coated 81 milliGRAM(s) Oral daily  azithromycin  IVPB 500 milliGRAM(s) IV Intermittent every 24 hours  benztropine 2 milliGRAM(s) Oral two times a day  cefTRIAXone   IVPB 1 Gram(s) IV Intermittent every 24 hours  cefTRIAXone   IVPB      chlorhexidine 4% Liquid 1 Application(s) Topical <User Schedule>  diVALproex  milliGRAM(s) Oral two times a day  enoxaparin Injectable 40 milliGRAM(s) SubCutaneous daily  methylPREDNISolone sodium succinate Injectable 40 milliGRAM(s) IV Push every 12 hours  OLANZapine 10 milliGRAM(s) Oral two times a day  pantoprazole    Tablet 40 milliGRAM(s) Oral before breakfast  sertraline 100 milliGRAM(s) Oral every 12 hours  sodium chloride 3%  Inhalation 3 milliLiter(s) Inhalation every 8 hours  tiotropium 18 MICROgram(s) Capsule 1 Capsule(s) Inhalation daily    MEDICATIONS  (PRN):  acetaminophen   Tablet .. 650 milliGRAM(s) Oral every 6 hours PRN Moderate Pain (4 - 6)    LABS:                        11.2   13.47 )-----------( 245      ( 18 May 2019 07:15 )             37.2     05-17    142  |  97  |  23  ----------------------------<  80  4.1   |  40<H>  |  0.69    Ca    8.7      17 May 2019 07:34  Mg     2.4     05-17    05-15 @ 10:22  pH: 7.35  pCO2: 67  pO2: 114  SaO2: 97  05-14 @ 06:22  pH: 7.41  pCO2: 60  pO2: 88  SaO2: 96  05-14 @ 01:07  pH: 7.35  pCO2: 70  pO2: 69  SaO2: 91  05-13 @ 23:01  pH: 7.32  pCO2: 74  pO2: 69  SaO2: 91  05-13 @ 21:57  pH: 7.29  pCO2: 80  pO2: 94  SaO2: 95  05-13 @ 18:48  pH: 7.34  pCO2: 66  pO2: 118  SaO2: 97  05-13 @ 15:06  pH: 7.24  pCO2: 83  pO2: 123  SaO2: 97  05-13 @ 12:27  pH: 7.27  pCO2: 74  pO2: 67  SaO2: 89  05-13 @ 10:01  pH: 7.26  pCO2: 71  pO2: 105  SaO2: 96      ASSESSMENT AND PLAN:  ·	Acute on chronic hypoxic hypercarbic Respiratory failure.  ·	Acute COPD Exacerbation.  ·	Infected Bronchiectasis/Pneumonia.  ·	Anemia.  ·	History of TB with Right lung collapse.  ·	Schizophrenia.    Continue antibiotics and bronchodilators.  Slow steroid taper.  Continue nocturnal and PRN NIV with nasal O2 during day.
INTERVAL HPI:  57yo, F with  COPD, Bronchiectasis, TB, Right Lung Collapse,  Chronic hypoxic hypercarbic Respiratory failure on home O2 and NIV/ Trilogy, Schizophrenia,    Presented  with SOB increasing for 2 weeks, associated with nonproductive cough, fever.  Noted CP.  Denies CP, palpitation, n/v/d, fever, chills, weakness, abdominal pain, dysuria. Not able to provide more details.  05/13/19: Transferred to ICU for worsening hypercarbia.  05/15/19:  Out of ICU.    OVERNIGHT EVENTS:  On BIPAP, arouse able but lethargic.    Vital Signs Last 24 Hrs  T(C): 36.5 (16 May 2019 05:39), Max: 36.8 (15 May 2019 11:50)  T(F): 97.7 (16 May 2019 05:39), Max: 98.2 (15 May 2019 11:50)  HR: 80 (16 May 2019 09:20) (60 - 109)  BP: 149/81 (16 May 2019 05:39) (89/61 - 149/81)  BP(mean): 79 (15 May 2019 16:00) (67 - 83)  RR: 18 (16 May 2019 05:39) (18 - 24)  SpO2: 96% (16 May 2019 09:20) (84% - 100%)    PHYSICAL EXAM:  GEN:         responsive and comfortable.  HEENT:      BIPAP   RESP:         no wheezing.  CVS:           Regular rate and rhythm.   ABD:         Soft, non-tender, non-distended;     MEDICATIONS  (STANDING):  ALBUTerol/ipratropium for Nebulization 3 milliLiter(s) Nebulizer every 6 hours  ARIPiprazole 10 milliGRAM(s) Oral <User Schedule>  aspirin enteric coated 81 milliGRAM(s) Oral daily  azithromycin  IVPB 500 milliGRAM(s) IV Intermittent every 24 hours  benztropine 2 milliGRAM(s) Oral two times a day  cefTRIAXone   IVPB 1 Gram(s) IV Intermittent every 24 hours  cefTRIAXone   IVPB      chlorhexidine 4% Liquid 1 Application(s) Topical <User Schedule>  diVALproex  milliGRAM(s) Oral two times a day  enoxaparin Injectable 40 milliGRAM(s) SubCutaneous daily  methylPREDNISolone sodium succinate Injectable 40 milliGRAM(s) IV Push every 12 hours  OLANZapine 10 milliGRAM(s) Oral two times a day  pantoprazole    Tablet 40 milliGRAM(s) Oral before breakfast  sertraline 100 milliGRAM(s) Oral every 12 hours  sodium chloride 3%  Inhalation 3 milliLiter(s) Inhalation every 8 hours  tiotropium 18 MICROgram(s) Capsule 1 Capsule(s) Inhalation daily    MEDICATIONS  (PRN):  acetaminophen   Tablet .. 650 milliGRAM(s) Oral every 6 hours PRN Moderate Pain (4 - 6)    LABS:                        10.5   10.20 )-----------( 231      ( 16 May 2019 08:15 )             35.7     05-16    142  |  99  |  21  ----------------------------<  85  4.3   |  39<H>  |  0.61    Ca    8.6      16 May 2019 08:15  Phos  3.8     05-16  Mg     2.3     05-16    05-15 @ 10:22  pH: 7.35  pCO2: 67  pO2: 114  SaO2: 97  05-14 @ 06:22  pH: 7.41  pCO2: 60  pO2: 88  SaO2: 96  05-14 @ 01:07  pH: 7.35  pCO2: 70  pO2: 69  SaO2: 91  05-13 @ 23:01  pH: 7.32  pCO2: 74  pO2: 69  SaO2: 91  05-13 @ 21:57  pH: 7.29  pCO2: 80  pO2: 94  SaO2: 95  05-13 @ 18:48  pH: 7.34  pCO2: 66  pO2: 118  SaO2: 97  05-13 @ 15:06  pH: 7.24  pCO2: 83  pO2: 123  SaO2: 97  05-13 @ 12:27  pH: 7.27  pCO2: 74  pO2: 67  SaO2: 89  05-13 @ 10:01  pH: 7.26  pCO2: 71  pO2: 105  SaO2: 96    ASSESSMENT AND PLAN:  ·	Acute on chronic hypoxic hypercarbic Respiratory failure.  ·	Acute COPD Exacerbation.  ·	Infected Bronchiectasis/Pneumonia.  ·	Anemia.  ·	History of TB with Right lung collapse.  ·	Schizophrenia.    Continue nocturnal and PRN NIV with nasal O2 during day.  On hypertonic saline and chest vest to mobilize secretions.  Continue antibiotics and bronchodilators.  Slow steroid taper.
INTERVAL HPI:  57yo, F with  COPD, Bronchiectasis, TB, Right Lung Collapse,  Chronic hypoxic hypercarbic Respiratory failure on home O2 and NIV/ Trilogy, Schizophrenia,    Presented  with SOB increasing for 2 weeks, associated with nonproductive cough, fever.  Noted CP.  Denies CP, palpitation, n/v/d, fever, chills, weakness, abdominal pain, dysuria. Not able to provide more details.  05/13/19: Transferred to ICU for worsening hypercarbia.  05/15/19:  Out of ICU.    OVERNIGHT EVENTS:  Resting with nasal O2.    Vital Signs Last 24 Hrs  T(C): 36.2 (17 May 2019 12:25), Max: 37 (16 May 2019 17:00)  T(F): 97.2 (17 May 2019 12:25), Max: 98.6 (16 May 2019 17:00)  HR: 72 (17 May 2019 12:25) (66 - 82)  BP: 102/75 (17 May 2019 12:25) (102/75 - 121/51)  BP(mean): --  RR: 20 (17 May 2019 12:25) (16 - 20)  SpO2: 96% (17 May 2019 12:25) (93% - 98%)    PHYSICAL EXAM:  GEN:        responsive and comfortable.  HEENT:    Normal.    RESP:       no wheezing.  CVS:         Regular rate and rhythm.   ABD:         Soft, non-tender, non-distended;     MEDICATIONS  (STANDING):  ALBUTerol/ipratropium for Nebulization 3 milliLiter(s) Nebulizer every 6 hours  ARIPiprazole 10 milliGRAM(s) Oral <User Schedule>  aspirin enteric coated 81 milliGRAM(s) Oral daily  azithromycin  IVPB 500 milliGRAM(s) IV Intermittent every 24 hours  benztropine 2 milliGRAM(s) Oral two times a day  cefTRIAXone   IVPB 1 Gram(s) IV Intermittent every 24 hours  cefTRIAXone   IVPB      chlorhexidine 4% Liquid 1 Application(s) Topical <User Schedule>  diVALproex  milliGRAM(s) Oral two times a day  enoxaparin Injectable 40 milliGRAM(s) SubCutaneous daily  methylPREDNISolone sodium succinate Injectable 40 milliGRAM(s) IV Push every 12 hours  OLANZapine 10 milliGRAM(s) Oral two times a day  pantoprazole    Tablet 40 milliGRAM(s) Oral before breakfast  sertraline 100 milliGRAM(s) Oral every 12 hours  sodium chloride 3%  Inhalation 3 milliLiter(s) Inhalation every 8 hours  tiotropium 18 MICROgram(s) Capsule 1 Capsule(s) Inhalation daily    MEDICATIONS  (PRN):  acetaminophen   Tablet .. 650 milliGRAM(s) Oral every 6 hours PRN Moderate Pain (4 - 6)    LABS:                        11.2   13.45 )-----------( 258      ( 17 May 2019 07:34 )             38.5     05-17    142  |  97  |  23  ----------------------------<  80  4.1   |  40<H>  |  0.69    Ca    8.7      17 May 2019 07:34  Phos  3.8     05-16  Mg     2.4     05-17    05-15 @ 10:22  pH: 7.35  pCO2: 67  pO2: 114  SaO2: 97  05-14 @ 06:22  pH: 7.41  pCO2: 60  pO2: 88  SaO2: 96  05-14 @ 01:07  pH: 7.35  pCO2: 70  pO2: 69  SaO2: 91  05-13 @ 23:01  pH: 7.32  pCO2: 74  pO2: 69  SaO2: 91  05-13 @ 21:57  pH: 7.29  pCO2: 80  pO2: 94  SaO2: 95  05-13 @ 18:48  pH: 7.34  pCO2: 66  pO2: 118  SaO2: 97  05-13 @ 15:06  pH: 7.24  pCO2: 83  pO2: 123  SaO2: 97  05-13 @ 12:27  pH: 7.27  pCO2: 74  pO2: 67  SaO2: 89  05-13 @ 10:01  pH: 7.26  pCO2: 71  pO2: 105  SaO2: 96    ASSESSMENT AND PLAN:  ·	Acute on chronic hypoxic hypercarbic Respiratory failure.  ·	Acute COPD Exacerbation.  ·	Infected Bronchiectasis/Pneumonia.  ·	Anemia.  ·	History of TB with Right lung collapse.  ·	Schizophrenia.    On hypertonic saline and chest vest to mobilize secretions.  Continue antibiotics and bronchodilators.  Slow steroid taper.  Continue nocturnal and PRN NIV with nasal O2 during day.
INTERVAL HPI:  59yo, F with  COPD, Bronchiectasis, TB, Right Lung Collapse,  Chronic hypoxic hypercarbic Respiratory failure on home O2 and NIV/ Trilogy, Schizophrenia,    Presented  with SOB increasing for 2 weeks, associated with nonproductive cough, fever.  Noted CP.  Denies CP, palpitation, n/v/d, fever, chills, weakness, abdominal pain, dysuria. Not able to provide more details.  05/13/19: Transferred to ICU for worsening hypercarbia.  05/15/19:  Out of ICU.    OVERNIGHT EVENTS:  Awake, responsive and comfortable.    Vital Signs Last 24 Hrs  T(C): 35.7 (19 May 2019 05:40), Max: 36.6 (18 May 2019 16:28)  T(F): 96.2 (19 May 2019 05:40), Max: 97.8 (18 May 2019 16:28)  HR: 76 (19 May 2019 13:10) (58 - 87)  BP: 95/50 (19 May 2019 13:10) (95/50 - 163/97)  BP(mean): --  RR: 16 (19 May 2019 13:10) (16 - 18)  SpO2: 98% (19 May 2019 13:10) (93% - 98%)    PHYSICAL EXAM:  GEN:         Awake, responsive and comfortable.  HEENT:    Normal.    RESP:       no wheezing.  CVS:          Regular rate and rhythm.   ABD:         Soft, non-tender, non-distended;     MEDICATIONS  (STANDING):  ALBUTerol/ipratropium for Nebulization 3 milliLiter(s) Nebulizer every 6 hours  ARIPiprazole 10 milliGRAM(s) Oral <User Schedule>  aspirin enteric coated 81 milliGRAM(s) Oral daily  azithromycin  IVPB 500 milliGRAM(s) IV Intermittent every 24 hours  benztropine 2 milliGRAM(s) Oral two times a day  cefTRIAXone   IVPB 1 Gram(s) IV Intermittent every 24 hours  cefTRIAXone   IVPB      chlorhexidine 4% Liquid 1 Application(s) Topical <User Schedule>  diVALproex  milliGRAM(s) Oral two times a day  enoxaparin Injectable 40 milliGRAM(s) SubCutaneous daily  methylPREDNISolone sodium succinate Injectable 40 milliGRAM(s) IV Push every 12 hours  OLANZapine 10 milliGRAM(s) Oral two times a day  pantoprazole    Tablet 40 milliGRAM(s) Oral before breakfast  sertraline 100 milliGRAM(s) Oral every 12 hours  sodium chloride 3%  Inhalation 3 milliLiter(s) Inhalation every 8 hours  tiotropium 18 MICROgram(s) Capsule 1 Capsule(s) Inhalation daily    MEDICATIONS  (PRN):  acetaminophen   Tablet .. 650 milliGRAM(s) Oral every 6 hours PRN Moderate Pain (4 - 6)    LABS:                        11.2   13.47 )-----------( 245      ( 18 May 2019 07:15 )             37.2    05-15 @ 10:22  pH: 7.35  pCO2: 67  pO2: 114  SaO2: 97  05-14 @ 06:22  pH: 7.41  pCO2: 60  pO2: 88  SaO2: 96  05-14 @ 01:07  pH: 7.35  pCO2: 70  pO2: 69  SaO2: 91  05-13 @ 23:01  pH: 7.32  pCO2: 74  pO2: 69  SaO2: 91  05-13 @ 21:57  pH: 7.29  pCO2: 80  pO2: 94  SaO2: 95  05-13 @ 18:48  pH: 7.34  pCO2: 66  pO2: 118  SaO2: 97  05-13 @ 15:06  pH: 7.24  pCO2: 83  pO2: 123  SaO2: 97  05-13 @ 12:27  pH: 7.27  pCO2: 74  pO2: 67  SaO2: 89  05-13 @ 10:01  pH: 7.26  pCO2: 71  pO2: 105  SaO2: 96    ASSESSMENT AND PLAN:  ·	Acute on chronic hypoxic hypercarbic Respiratory failure.  ·	Acute COPD Exacerbation.  ·	Infected Bronchiectasis/Pneumonia.  ·	Anemia.  ·	History of TB with Right lung collapse.  ·	Schizophrenia.    Will reduce steroids continue nebulizer.  O2 with nocturnal AVAP.  Chest Vest as needed.
INTERVAL HPI:  59yo, F with  COPD, Bronchiectasis, TB, Right Lung Collapse,  Chronic hypoxic hypercarbic Respiratory failure on home O2 and NIV/ Trilogy, Schizophrenia,    Presented  with SOB increasing for 2 weeks, associated with nonproductive cough, fever.  Noted CP.  Denies CP, palpitation, n/v/d, fever, chills, weakness, abdominal pain, dysuria. Not able to provide more details.  05/13/19: Transferred to ICU for worsening hypercarbia.  05/15/19:  Out of ICU.    OVERNIGHT EVENTS:  More awake responsive and comfortable.    Vital Signs Last 24 Hrs  T(C): 37.3 (20 May 2019 11:52), Max: 37.3 (20 May 2019 11:52)  T(F): 99.1 (20 May 2019 11:52), Max: 99.1 (20 May 2019 11:52)  HR: 71 (20 May 2019 14:30) (68 - 91)  BP: 115/58 (20 May 2019 11:52) (96/58 - 115/58)  BP(mean): 96 (19 May 2019 18:00) (96 - 96)  RR: 17 (20 May 2019 11:52) (16 - 18)  SpO2: 96% (20 May 2019 14:30) (90% - 99%)    PHYSICAL EXAM:  GEN:         Awake, responsive and comfortable.  HEENT:    Normal.    RESP:        no wheezing.  CVS:           Regular rate and rhythm.   ABD:         Soft, non-tender, non-distended;     MEDICATIONS  (STANDING):  ALBUTerol/ipratropium for Nebulization 3 milliLiter(s) Nebulizer every 6 hours  ARIPiprazole 10 milliGRAM(s) Oral <User Schedule>  aspirin enteric coated 81 milliGRAM(s) Oral daily  azithromycin  IVPB 500 milliGRAM(s) IV Intermittent every 24 hours  benztropine 2 milliGRAM(s) Oral two times a day  cefTRIAXone   IVPB 1 Gram(s) IV Intermittent every 24 hours  cefTRIAXone   IVPB      chlorhexidine 4% Liquid 1 Application(s) Topical <User Schedule>  diVALproex  milliGRAM(s) Oral two times a day  enoxaparin Injectable 40 milliGRAM(s) SubCutaneous daily  methylPREDNISolone sodium succinate Injectable 40 milliGRAM(s) IV Push every 12 hours  OLANZapine 10 milliGRAM(s) Oral two times a day  pantoprazole    Tablet 40 milliGRAM(s) Oral before breakfast  sertraline 100 milliGRAM(s) Oral every 12 hours  sodium chloride 3%  Inhalation 3 milliLiter(s) Inhalation every 8 hours  tiotropium 18 MICROgram(s) Capsule 1 Capsule(s) Inhalation daily    MEDICATIONS  (PRN):  acetaminophen   Tablet .. 650 milliGRAM(s) Oral every 6 hours PRN Moderate Pain (4 - 6)    LABS:                        11.0   15.13 )-----------( 268      ( 20 May 2019 06:20 )             36.8   05-15 @ 10:22  pH: 7.35  pCO2: 67  pO2: 114  SaO2: 97  05-14 @ 06:22  pH: 7.41  pCO2: 60  pO2: 88  SaO2: 96  05-14 @ 01:07  pH: 7.35  pCO2: 70  pO2: 69  SaO2: 91  05-13 @ 23:01  pH: 7.32  pCO2: 74  pO2: 69  SaO2: 91  05-13 @ 21:57  pH: 7.29  pCO2: 80  pO2: 94  SaO2: 95  05-13 @ 18:48  pH: 7.34  pCO2: 66  pO2: 118  SaO2: 97  05-13 @ 15:06  pH: 7.24  pCO2: 83  pO2: 123  SaO2: 97    ASSESSMENT AND PLAN:  ·	Acute on chronic hypoxic hypercarbic Respiratory failure.  ·	Acute COPD Exacerbation.  ·	Infected Bronchiectasis/Pneumonia.  ·	Anemia.  ·	History of TB with Right lung collapse.  ·	Schizophrenia.    Will reduce steroids, continue nebulizer.  O2 with nocturnal AVAP.  On chest vest every 8 hours.  Discharge planning.
Patient is a 58y old  Female who presents with a chief complaint of Cough and difficulty breathing. (13 May 2019 15:57)      SUBJECTIVE/OBJECTIVE:  In CCU  Event noted;  Acute on chronic hypercarbic respiratory failure while on BiPAP. 7.24/83/123/35/97%. Transferred to ICU for respiratory monitoring. DX: acute on chronic hypercarbic respiratory failure, chronic hypoxic respiratory failure, acute exacerbation of bronchiectasis, COPD exacerbation, PNA.   Currently, without complaints. Patient resting comfortably.         MEDICATIONS  (STANDING):  ALBUTerol/ipratropium for Nebulization 3 milliLiter(s) Nebulizer every 4 hours  ARIPiprazole 10 milliGRAM(s) Oral <User Schedule>  aspirin enteric coated 81 milliGRAM(s) Oral daily  azithromycin  IVPB 500 milliGRAM(s) IV Intermittent every 24 hours  benztropine 2 milliGRAM(s) Oral two times a day  cefTRIAXone   IVPB 1 Gram(s) IV Intermittent every 24 hours  cefTRIAXone   IVPB      chlorhexidine 4% Liquid 1 Application(s) Topical <User Schedule>  dextrose 5% + sodium chloride 0.45%. 1000 milliLiter(s) (70 mL/Hr) IV Continuous <Continuous>  diVALproex  milliGRAM(s) Oral two times a day  enoxaparin Injectable 40 milliGRAM(s) SubCutaneous daily  methylPREDNISolone sodium succinate Injectable 40 milliGRAM(s) IV Push every 8 hours  OLANZapine 10 milliGRAM(s) Oral two times a day  pantoprazole    Tablet 40 milliGRAM(s) Oral before breakfast  sertraline 100 milliGRAM(s) Oral every 12 hours  tiotropium 18 MICROgram(s) Capsule 1 Capsule(s) Inhalation daily    MEDICATIONS  (PRN):  acetaminophen   Tablet .. 650 milliGRAM(s) Oral every 6 hours PRN Moderate Pain (4 - 6)      Allergies    No Known Allergies    Intolerances          Vital Signs Last 24 Hrs  T(C): 36.4 (14 May 2019 08:00), Max: 36.4 (14 May 2019 08:00)  T(F): 97.6 (14 May 2019 08:00), Max: 97.6 (14 May 2019 08:00)  HR: 68 (14 May 2019 12:00) (51 - 93)  BP: 115/63 (14 May 2019 12:00) (93/53 - 129/69)  BP(mean): 74 (14 May 2019 12:00) (63 - 83)  RR: 18 (14 May 2019 12:00) (13 - 34)  SpO2: 100% (14 May 2019 12:00) (89% - 100%)              Physical Exam:  Physical Exam: PHYSICAL EXAM:  GENERAL: NAD,   HEAD:  Atraumatic, Normocephalic  EYES: EOMI, PERRLA, conjunctiva and sclera clear  ENMT: No tonsillar erythema, exudates, or enlargement; Moist mucous membranes, No lesions  NECK: Supple, No JVD, Normal thyroid  NERVOUS SYSTEM:  Alert ; Motor Strength 4/5   CHEST/LUNG: Crackles bilaterally; No rales, or rubs.  No wheezes, rhonchi.   HEART: Regular rate and rhythm; No murmurs, rubs, or gallops  ABDOMEN: Soft, Nontender, Nondistended; Bowel sounds present  EXTREMITIES:  2+ Peripheral Pulses, No clubbing, cyanosis, or edema  LYMPH: No lymphadenopathy noted SKIN: No rashes or lesions	            LABS:                        10.2   13.33 )-----------( 217      ( 14 May 2019 04:40 )             35.6     05-14    140  |  100  |  15  ----------------------------<  149<H>  4.4   |  36<H>  |  0.62    Ca    8.6      14 May 2019 08:44  Phos  2.5     05-14  Mg     2.5     05-14    TPro  7.5  /  Alb  2.8<L>  /  TBili  0.3  /  DBili  x   /  AST  7<L>  /  ALT  8<L>  /  AlkPhos  78  05-14        CAPILLARY BLOOD GLUCOSE        CARDIAC MARKERS ( 13 May 2019 11:43 )  <.015 ng/mL / x     / 37 U/L / x     / x      CARDIAC MARKERS ( 13 May 2019 03:00 )  <.015 ng/mL / x     / 44 U/L / x     / x      CARDIAC MARKERS ( 12 May 2019 20:01 )  <.015 ng/mL / x     / 44 U/L / x     / x            RADIOLOGY & ADDITIONAL TESTS:        Consultant(s) Notes Reviewed:  [xx ] YES  [ ] NO
Patient is a 58y old  Female who presents with a chief complaint of Cough and difficulty breathing. (16 May 2019 10:22)      SUBJECTIVE/OBJECTIVE:  Out of CCU.  Without complaints. Patient resting comfortably.     MEDICATIONS  (STANDING):  ALBUTerol/ipratropium for Nebulization 3 milliLiter(s) Nebulizer every 6 hours  ARIPiprazole 10 milliGRAM(s) Oral <User Schedule>  aspirin enteric coated 81 milliGRAM(s) Oral daily  azithromycin  IVPB 500 milliGRAM(s) IV Intermittent every 24 hours  benztropine 2 milliGRAM(s) Oral two times a day  cefTRIAXone   IVPB 1 Gram(s) IV Intermittent every 24 hours  cefTRIAXone   IVPB      chlorhexidine 4% Liquid 1 Application(s) Topical <User Schedule>  diVALproex  milliGRAM(s) Oral two times a day  enoxaparin Injectable 40 milliGRAM(s) SubCutaneous daily  methylPREDNISolone sodium succinate Injectable 40 milliGRAM(s) IV Push every 12 hours  OLANZapine 10 milliGRAM(s) Oral two times a day  pantoprazole    Tablet 40 milliGRAM(s) Oral before breakfast  sertraline 100 milliGRAM(s) Oral every 12 hours  sodium chloride 3%  Inhalation 3 milliLiter(s) Inhalation every 8 hours  tiotropium 18 MICROgram(s) Capsule 1 Capsule(s) Inhalation daily    MEDICATIONS  (PRN):  acetaminophen   Tablet .. 650 milliGRAM(s) Oral every 6 hours PRN Moderate Pain (4 - 6)      Allergies    No Known Allergies    Intolerances          Vital Signs Last 24 Hrs  T(C): 36.1 (16 May 2019 12:19), Max: 36.6 (15 May 2019 16:42)  T(F): 97 (16 May 2019 12:19), Max: 97.9 (15 May 2019 16:42)  HR: 96 (16 May 2019 12:19) (60 - 109)  BP: 136/79 (16 May 2019 12:19) (115/65 - 149/81)  BP(mean): 79 (15 May 2019 16:00) (79 - 79)  RR: 18 (16 May 2019 12:19) (18 - 18)  SpO2: 95% (16 May 2019 12:19) (93% - 100%)            Physical Exam:  Physical Exam: PHYSICAL EXAM:  GENERAL: NAD,   HEAD:  Atraumatic, Normocephalic  EYES: EOMI, PERRLA, conjunctiva and sclera clear  ENMT: No tonsillar erythema, exudates, or enlargement; Moist mucous membranes, No lesions  NECK: Supple, No JVD, Normal thyroid  NERVOUS SYSTEM:  Alert ; Motor Strength 4/5   CHEST/LUNG: Rhonchi bilaterally; No rales, or rubs.  No wheezes.  HEART: Regular rate and rhythm; No murmurs, rubs, or gallops  ABDOMEN: Soft, Nontender, Nondistended; Bowel sounds present  EXTREMITIES:  2+ Peripheral Pulses, No clubbing, cyanosis, or edema  LYMPH: No lymphadenopathy noted SKIN: No rashes or lesions	            LABS:                        10.5   10.20 )-----------( 231      ( 16 May 2019 08:15 )             35.7     05-16    142  |  99  |  21  ----------------------------<  85  4.3   |  39<H>  |  0.61    Ca    8.6      16 May 2019 08:15  Phos  3.8     05-16  Mg     2.3     05-16          CAPILLARY BLOOD GLUCOSE              RADIOLOGY & ADDITIONAL TESTS:        Consultant(s) Notes Reviewed:  [xxx ] YES  [ ] NO
Patient is a 58y old  Female who presents with a chief complaint of Cough and difficulty breathing. (18 May 2019 14:42)      SUBJECTIVE/OBJECTIVE:    Without complaints. Patient resting comfortably.     MEDICATIONS  (STANDING):  ALBUTerol/ipratropium for Nebulization 3 milliLiter(s) Nebulizer every 6 hours  ARIPiprazole 10 milliGRAM(s) Oral <User Schedule>  aspirin enteric coated 81 milliGRAM(s) Oral daily  azithromycin  IVPB 500 milliGRAM(s) IV Intermittent every 24 hours  benztropine 2 milliGRAM(s) Oral two times a day  cefTRIAXone   IVPB 1 Gram(s) IV Intermittent every 24 hours  cefTRIAXone   IVPB      chlorhexidine 4% Liquid 1 Application(s) Topical <User Schedule>  diVALproex  milliGRAM(s) Oral two times a day  enoxaparin Injectable 40 milliGRAM(s) SubCutaneous daily  methylPREDNISolone sodium succinate Injectable 40 milliGRAM(s) IV Push every 12 hours  OLANZapine 10 milliGRAM(s) Oral two times a day  pantoprazole    Tablet 40 milliGRAM(s) Oral before breakfast  sertraline 100 milliGRAM(s) Oral every 12 hours  sodium chloride 3%  Inhalation 3 milliLiter(s) Inhalation every 8 hours  tiotropium 18 MICROgram(s) Capsule 1 Capsule(s) Inhalation daily    MEDICATIONS  (PRN):  acetaminophen   Tablet .. 650 milliGRAM(s) Oral every 6 hours PRN Moderate Pain (4 - 6)      Allergies    No Known Allergies    Intolerances          Vital Signs Last 24 Hrs  T(C): 35.7 (19 May 2019 05:40), Max: 36.6 (18 May 2019 16:28)  T(F): 96.2 (19 May 2019 05:40), Max: 97.8 (18 May 2019 16:28)  HR: 76 (19 May 2019 13:10) (58 - 87)  BP: 95/50 (19 May 2019 13:10) (95/50 - 163/97)  BP(mean): --  RR: 16 (19 May 2019 13:10) (16 - 18)  SpO2: 98% (19 May 2019 13:10) (93% - 98%)      Physical Exam:  Physical Exam: PHYSICAL EXAM:  GENERAL: NAD,   HEAD:  Atraumatic, Normocephalic  EYES: EOMI, PERRLA, conjunctiva and sclera clear  ENMT: No tonsillar erythema, exudates, or enlargement; Moist mucous membranes, No lesions  NECK: Supple, No JVD, Normal thyroid  NERVOUS SYSTEM:  Alert ; Motor Strength 4/5   CHEST/LUNG: decreased  Rhonchi bilaterally; No rales, or rubs.  No wheezes.  HEART: Regular rate and rhythm; No murmurs, rubs, or gallops  ABDOMEN: Soft, Nontender, Nondistended; Bowel sounds present  EXTREMITIES:  2+ Peripheral Pulses, No clubbing, cyanosis, or edema  LYMPH: No lymphadenopathy noted SKIN: No rashes or lesions	              LABS:                        11.2   13.47 )-----------( 245      ( 18 May 2019 07:15 )             37.2               CAPILLARY BLOOD GLUCOSE              RADIOLOGY & ADDITIONAL TESTS:        Consultant(s) Notes Reviewed:  [ xx] YES  [ ] NO
Patient is a 58y old  Female who presents with a chief complaint of Cough and difficulty breathing. (19 May 2019 14:50)      SUBJECTIVE/OBJECTIVE:    Without complaints.       MEDICATIONS  (STANDING):  ALBUTerol/ipratropium for Nebulization 3 milliLiter(s) Nebulizer every 6 hours  ARIPiprazole 10 milliGRAM(s) Oral <User Schedule>  aspirin enteric coated 81 milliGRAM(s) Oral daily  azithromycin  IVPB 500 milliGRAM(s) IV Intermittent every 24 hours  benztropine 2 milliGRAM(s) Oral two times a day  cefTRIAXone   IVPB 1 Gram(s) IV Intermittent every 24 hours  cefTRIAXone   IVPB      chlorhexidine 4% Liquid 1 Application(s) Topical <User Schedule>  diVALproex  milliGRAM(s) Oral two times a day  enoxaparin Injectable 40 milliGRAM(s) SubCutaneous daily  methylPREDNISolone sodium succinate Injectable 40 milliGRAM(s) IV Push every 12 hours  OLANZapine 10 milliGRAM(s) Oral two times a day  pantoprazole    Tablet 40 milliGRAM(s) Oral before breakfast  sertraline 100 milliGRAM(s) Oral every 12 hours  sodium chloride 3%  Inhalation 3 milliLiter(s) Inhalation every 8 hours  tiotropium 18 MICROgram(s) Capsule 1 Capsule(s) Inhalation daily    MEDICATIONS  (PRN):  acetaminophen   Tablet .. 650 milliGRAM(s) Oral every 6 hours PRN Moderate Pain (4 - 6)      Allergies    No Known Allergies    Intolerances          Vital Signs Last 24 Hrs  T(C): 37.3 (20 May 2019 11:52), Max: 37.3 (20 May 2019 11:52)  T(F): 99.1 (20 May 2019 11:52), Max: 99.1 (20 May 2019 11:52)  HR: 91 (20 May 2019 11:52) (68 - 91)  BP: 115/58 (20 May 2019 11:52) (96/58 - 115/58)  BP(mean): 96 (19 May 2019 18:00) (96 - 96)  RR: 17 (20 May 2019 11:52) (16 - 18)  SpO2: 99% (20 May 2019 11:52) (90% - 99%)            Physical Exam:  Physical Exam: PHYSICAL EXAM:  GENERAL: NAD,   HEAD:  Atraumatic, Normocephalic  EYES: EOMI, PERRLA, conjunctiva and sclera clear  ENMT: No tonsillar erythema, exudates, or enlargement; Moist mucous membranes, No lesions  NECK: Supple, No JVD, Normal thyroid  NERVOUS SYSTEM:  Alert ; Motor Strength 4/5   CHEST/LUNG: Scattered Rhonchi bilaterally; No rales, or rubs.  No wheezes.  HEART: Regular rate and rhythm; No murmurs, rubs, or gallops  ABDOMEN: Soft, Nontender, Nondistended; Bowel sounds present  EXTREMITIES:  2+ Peripheral Pulses, No clubbing, cyanosis, or edema  LYMPH: No lymphadenopathy noted SKIN: No rashes or lesions	                    LABS:                        11.0   15.13 )-----------( 268      ( 20 May 2019 06:20 )             36.8               CAPILLARY BLOOD GLUCOSE              RADIOLOGY & ADDITIONAL TESTS:        Consultant(s) Notes Reviewed:  [xxx ] YES  [ ] NO
Patient is a 58y old  Female who presents with a chief complaint of Cough and difficulty breathing. (20 May 2019 15:01)      SUBJECTIVE/OBJECTIVE:    Without complaints. Patient resting comfortably.     MEDICATIONS  (STANDING):  ALBUTerol/ipratropium for Nebulization 3 milliLiter(s) Nebulizer every 6 hours  ARIPiprazole 10 milliGRAM(s) Oral <User Schedule>  aspirin enteric coated 81 milliGRAM(s) Oral daily  azithromycin  IVPB 500 milliGRAM(s) IV Intermittent every 24 hours  benztropine 2 milliGRAM(s) Oral two times a day  cefTRIAXone   IVPB 1 Gram(s) IV Intermittent every 24 hours  cefTRIAXone   IVPB      chlorhexidine 4% Liquid 1 Application(s) Topical <User Schedule>  diVALproex  milliGRAM(s) Oral two times a day  enoxaparin Injectable 40 milliGRAM(s) SubCutaneous daily  methylPREDNISolone sodium succinate Injectable 20 milliGRAM(s) IV Push every 12 hours  OLANZapine 10 milliGRAM(s) Oral two times a day  pantoprazole    Tablet 40 milliGRAM(s) Oral before breakfast  sertraline 100 milliGRAM(s) Oral every 12 hours  sodium chloride 3%  Inhalation 3 milliLiter(s) Inhalation every 8 hours  tiotropium 18 MICROgram(s) Capsule 1 Capsule(s) Inhalation daily    MEDICATIONS  (PRN):  acetaminophen   Tablet .. 650 milliGRAM(s) Oral every 6 hours PRN Moderate Pain (4 - 6)      Allergies    No Known Allergies    Intolerances          Vital Signs Last 24 Hrs  T(C): 36.3 (21 May 2019 11:25), Max: 36.6 (20 May 2019 17:09)  T(F): 97.3 (21 May 2019 11:25), Max: 97.9 (20 May 2019 17:09)  HR: 60 (21 May 2019 11:30) (60 - 83)  BP: 100/60 (21 May 2019 11:25) (100/60 - 125/63)  BP(mean): --  RR: 17 (21 May 2019 11:25) (16 - 18)  SpO2: 98% (21 May 2019 11:30) (91% - 99%)    PHYSICAL EXAM:  GENERAL: NAD, well-groomed, well-developed  HEAD:  Atraumatic, Normocephalic  EYES: EOMI, PERRLA, conjunctiva and sclera clear  ENMT: No tonsillar erythema, exudates, or enlargement; Moist mucous membranes, No lesions  NECK: Supple, No JVD, Normal thyroid  NERVOUS SYSTEM:  Alert & Oriented X3; Motor Strength 5/5   CHEST/LUNG: Scattered rhonchi bilaterally; No rales, wheezing, or rubs  HEART: Regular rate and rhythm; No murmurs, rubs, or gallops  ABDOMEN: Soft, Nontender, Nondistended; Bowel sounds present  EXTREMITIES:  2+ Peripheral Pulses, No clubbing, cyanosis, or edema  LYMPH: No lymphadenopathy noted  SKIN: No rashes or lesions    LABS:                        11.3   10.77 )-----------( 202      ( 21 May 2019 08:16 )             38.3     05-21    138  |  98  |  23  ----------------------------<  128<H>  4.4   |  36<H>  |  0.65    Ca    8.6      21 May 2019 08:16          CAPILLARY BLOOD GLUCOSE              RADIOLOGY & ADDITIONAL TESTS:        Consultant(s) Notes Reviewed:  [xxxx ] YES  [ ] NO
Patient is a 58y old  Female who presents with a chief complaint of Cough and difficulty breathing. (13 May 2019 08:39)      SUBJECTIVE/OBJECTIVE:    Resting comfortably, on BIPAP    MEDICATIONS  (STANDING):  ALBUTerol/ipratropium for Nebulization 3 milliLiter(s) Nebulizer every 6 hours  ARIPiprazole 10 milliGRAM(s) Oral <User Schedule>  aspirin enteric coated 81 milliGRAM(s) Oral daily  azithromycin  IVPB 500 milliGRAM(s) IV Intermittent every 24 hours  benztropine 2 milliGRAM(s) Oral two times a day  cefTRIAXone   IVPB 1 Gram(s) IV Intermittent every 24 hours  cefTRIAXone   IVPB      dextrose 5% + sodium chloride 0.45%. 1000 milliLiter(s) (70 mL/Hr) IV Continuous <Continuous>  diVALproex  milliGRAM(s) Oral two times a day  enoxaparin Injectable 40 milliGRAM(s) SubCutaneous daily  methylPREDNISolone sodium succinate Injectable 40 milliGRAM(s) IV Push every 8 hours  OLANZapine 10 milliGRAM(s) Oral two times a day  pantoprazole    Tablet 40 milliGRAM(s) Oral before breakfast  sertraline 100 milliGRAM(s) Oral every 12 hours  tiotropium 18 MICROgram(s) Capsule 1 Capsule(s) Inhalation daily    MEDICATIONS  (PRN):      Allergies    No Known Allergies    Intolerances          Vital Signs Last 24 Hrs  T(C): 37 (12 May 2019 17:45), Max: 37.7 (12 May 2019 12:32)  T(F): 98.6 (12 May 2019 17:45), Max: 99.9 (12 May 2019 12:32)  HR: 77 (13 May 2019 11:25) (77 - 88)  BP: 121/69 (12 May 2019 17:45) (114/70 - 130/70)  BP(mean): --  RR: 18 (12 May 2019 17:45) (18 - 18)  SpO2: 93% (13 May 2019 11:25) (93% - 99%)        Physical Exam:  Physical Exam: PHYSICAL EXAM:  GENERAL: NAD,   HEAD:  Atraumatic, Normocephalic  EYES: EOMI, PERRLA, conjunctiva and sclera clear  ENMT: No tonsillar erythema, exudates, or enlargement; Moist mucous membranes, No lesions  NECK: Supple, No JVD, Normal thyroid  NERVOUS SYSTEM:  Alert ; Motor Strength 4/5   CHEST/LUNG: Diffuse rhonchi bilaterally; No rales, or rubs.  No wheezes   HEART: Regular rate and rhythm; No murmurs, rubs, or gallops  ABDOMEN: Soft, Nontender, Nondistended; Bowel sounds present  EXTREMITIES:  2+ Peripheral Pulses, No clubbing, cyanosis, or edema  LYMPH: No lymphadenopathy noted SKIN: No rashes or lesions	            LABS:                        10.7   10.08 )-----------( 210      ( 13 May 2019 03:00 )             35.7     05-13    141  |  101  |  14  ----------------------------<  169<H>  4.5   |  32<H>  |  0.70    Ca    9.0      13 May 2019 03:00    TPro  8.3  /  Alb  3.1<L>  /  TBili  0.6  /  DBili  x   /  AST  17  /  ALT  9<L>  /  AlkPhos  91  05-12        CAPILLARY BLOOD GLUCOSE        CARDIAC MARKERS ( 13 May 2019 11:43 )  <.015 ng/mL / x     / 37 U/L / x     / x      CARDIAC MARKERS ( 13 May 2019 03:00 )  <.015 ng/mL / x     / 44 U/L / x     / x      CARDIAC MARKERS ( 12 May 2019 20:01 )  <.015 ng/mL / x     / 44 U/L / x     / x        Blood Gas Profile - Arterial (05.13.19 @ 10:01)    Blood Gas Source: Arterial    Blood Gas Comments: stopped, extremities assessed for cyanosis, Shane's test not indicated.    pH, Blood: 7.26    pCO2, Arterial: 71 mmHg    pO2, Arterial: 105 mmHg    HCO3, Arterial: 31 mmol/L    Base Excess, Arterial: 2.8 mmol/L    Oxygen Saturation, Arterial: 96 %    FIO2, Arterial: 28        RADIOLOGY & ADDITIONAL TESTS:    < from: CT Chest No Cont (05.13.19 @ 10:53) >    IMPRESSION:     Chronic right lung volume loss and cystic bronchiectasis.  Left lung nodular opacities, possibly infectious or inflammatory.        < end of copied text >      Consultant(s) Notes Reviewed:  [ ] YES  [ ] NO
Patient is a 58y old  Female who presents with a chief complaint of Cough and difficulty breathing. (16 May 2019 13:20)      SUBJECTIVE/OBJECTIVE:    Resting comfortably.    MEDICATIONS  (STANDING):  ALBUTerol/ipratropium for Nebulization 3 milliLiter(s) Nebulizer every 6 hours  ARIPiprazole 10 milliGRAM(s) Oral <User Schedule>  aspirin enteric coated 81 milliGRAM(s) Oral daily  azithromycin  IVPB 500 milliGRAM(s) IV Intermittent every 24 hours  benztropine 2 milliGRAM(s) Oral two times a day  cefTRIAXone   IVPB 1 Gram(s) IV Intermittent every 24 hours  cefTRIAXone   IVPB      chlorhexidine 4% Liquid 1 Application(s) Topical <User Schedule>  diVALproex  milliGRAM(s) Oral two times a day  enoxaparin Injectable 40 milliGRAM(s) SubCutaneous daily  methylPREDNISolone sodium succinate Injectable 40 milliGRAM(s) IV Push every 12 hours  OLANZapine 10 milliGRAM(s) Oral two times a day  pantoprazole    Tablet 40 milliGRAM(s) Oral before breakfast  sertraline 100 milliGRAM(s) Oral every 12 hours  sodium chloride 3%  Inhalation 3 milliLiter(s) Inhalation every 8 hours  tiotropium 18 MICROgram(s) Capsule 1 Capsule(s) Inhalation daily    MEDICATIONS  (PRN):  acetaminophen   Tablet .. 650 milliGRAM(s) Oral every 6 hours PRN Moderate Pain (4 - 6)      Allergies    No Known Allergies    Intolerances          Vital Signs Last 24 Hrs  T(C): 36.6 (17 May 2019 05:00), Max: 37 (16 May 2019 17:00)  T(F): 97.9 (17 May 2019 05:00), Max: 98.6 (16 May 2019 17:00)  HR: 66 (17 May 2019 11:10) (66 - 96)  BP: 120/73 (17 May 2019 05:00) (104/60 - 136/79)  BP(mean): --  RR: 16 (17 May 2019 05:00) (16 - 18)  SpO2: 93% (17 May 2019 11:10) (93% - 98%)        Physical Exam:  Physical Exam: PHYSICAL EXAM:  GENERAL: NAD,   HEAD:  Atraumatic, Normocephalic  EYES: EOMI, PERRLA, conjunctiva and sclera clear  ENMT: No tonsillar erythema, exudates, or enlargement; Moist mucous membranes, No lesions  NECK: Supple, No JVD, Normal thyroid  NERVOUS SYSTEM:  Alert ; Motor Strength 4/5   CHEST/LUNG: decreased  Rhonchi bilaterally; No rales, or rubs.  No wheezes.  HEART: Regular rate and rhythm; No murmurs, rubs, or gallops  ABDOMEN: Soft, Nontender, Nondistended; Bowel sounds present  EXTREMITIES:  2+ Peripheral Pulses, No clubbing, cyanosis, or edema  LYMPH: No lymphadenopathy noted SKIN: No rashes or lesions	              LABS:                        11.2   13.45 )-----------( 258      ( 17 May 2019 07:34 )             38.5     05-17    142  |  97  |  23  ----------------------------<  80  4.1   |  40<H>  |  0.69    Ca    8.7      17 May 2019 07:34  Phos  3.8     05-16  Mg     2.4     05-17          CAPILLARY BLOOD GLUCOSE              RADIOLOGY & ADDITIONAL TESTS:        Consultant(s) Notes Reviewed:  [ ] YES  [ ] NO

## 2019-05-22 NOTE — DISCHARGE NOTE NURSING/CASE MANAGEMENT/SOCIAL WORK - NSDCDPATPORTLINK_GEN_ALL_CORE
You can access the [email or web address], offered by [facility], by registering with the following website: [email or web address]

## 2019-05-23 RX ORDER — TIOTROPIUM BROMIDE 18 UG/1
1 CAPSULE ORAL; RESPIRATORY (INHALATION)
Qty: 30 | Refills: 3
Start: 2019-05-23 | End: 2019-09-19

## 2019-05-23 RX ORDER — PANTOPRAZOLE SODIUM 20 MG/1
1 TABLET, DELAYED RELEASE ORAL
Qty: 30 | Refills: 0
Start: 2019-05-23 | End: 2019-06-21

## 2019-05-24 DIAGNOSIS — F84.0 AUTISTIC DISORDER: ICD-10-CM

## 2019-05-24 DIAGNOSIS — J44.1 CHRONIC OBSTRUCTIVE PULMONARY DISEASE WITH (ACUTE) EXACERBATION: ICD-10-CM

## 2019-05-24 DIAGNOSIS — J15.8 PNEUMONIA DUE TO OTHER SPECIFIED BACTERIA: ICD-10-CM

## 2019-05-24 DIAGNOSIS — Z79.82 LONG TERM (CURRENT) USE OF ASPIRIN: ICD-10-CM

## 2019-05-24 DIAGNOSIS — J98.4 OTHER DISORDERS OF LUNG: ICD-10-CM

## 2019-05-24 DIAGNOSIS — J47.9 BRONCHIECTASIS, UNCOMPLICATED: ICD-10-CM

## 2019-05-24 DIAGNOSIS — J96.22 ACUTE AND CHRONIC RESPIRATORY FAILURE WITH HYPERCAPNIA: ICD-10-CM

## 2019-05-24 DIAGNOSIS — Z86.11 PERSONAL HISTORY OF TUBERCULOSIS: ICD-10-CM

## 2019-05-24 DIAGNOSIS — Z79.52 LONG TERM (CURRENT) USE OF SYSTEMIC STEROIDS: ICD-10-CM

## 2019-05-24 DIAGNOSIS — D64.9 ANEMIA, UNSPECIFIED: ICD-10-CM

## 2019-05-24 DIAGNOSIS — J96.21 ACUTE AND CHRONIC RESPIRATORY FAILURE WITH HYPOXIA: ICD-10-CM

## 2019-05-24 DIAGNOSIS — F20.9 SCHIZOPHRENIA, UNSPECIFIED: ICD-10-CM

## 2019-05-24 DIAGNOSIS — Z79.51 LONG TERM (CURRENT) USE OF INHALED STEROIDS: ICD-10-CM

## 2019-07-11 ENCOUNTER — INPATIENT (INPATIENT)
Facility: HOSPITAL | Age: 59
LOS: 3 days | Discharge: HOME | End: 2019-07-15
Attending: INTERNAL MEDICINE | Admitting: INTERNAL MEDICINE
Payer: MEDICAID

## 2019-07-11 VITALS
RESPIRATION RATE: 19 BRPM | OXYGEN SATURATION: 98 % | DIASTOLIC BLOOD PRESSURE: 75 MMHG | TEMPERATURE: 99 F | WEIGHT: 160.06 LBS | SYSTOLIC BLOOD PRESSURE: 120 MMHG | HEART RATE: 100 BPM | HEIGHT: 66 IN

## 2019-07-11 DIAGNOSIS — J96.01 ACUTE RESPIRATORY FAILURE WITH HYPOXIA: ICD-10-CM

## 2019-07-11 DIAGNOSIS — F20.9 SCHIZOPHRENIA, UNSPECIFIED: ICD-10-CM

## 2019-07-11 LAB
ALBUMIN SERPL ELPH-MCNC: 2.8 G/DL — LOW (ref 3.3–5)
ALP SERPL-CCNC: 90 U/L — SIGNIFICANT CHANGE UP (ref 40–120)
ALT FLD-CCNC: 15 U/L — SIGNIFICANT CHANGE UP (ref 12–78)
ANION GAP SERPL CALC-SCNC: 3 MMOL/L — LOW (ref 5–17)
AST SERPL-CCNC: 8 U/L — LOW (ref 15–37)
BASE EXCESS BLDA CALC-SCNC: 6.5 MMOL/L — HIGH (ref -2–2)
BASE EXCESS BLDA CALC-SCNC: 8 MMOL/L — HIGH (ref -2–2)
BASOPHILS # BLD AUTO: 0.04 K/UL — SIGNIFICANT CHANGE UP (ref 0–0.2)
BASOPHILS NFR BLD AUTO: 0.4 % — SIGNIFICANT CHANGE UP (ref 0–2)
BILIRUB SERPL-MCNC: 0.3 MG/DL — SIGNIFICANT CHANGE UP (ref 0.2–1.2)
BLOOD GAS COMMENTS: SIGNIFICANT CHANGE UP
BLOOD GAS SOURCE: SIGNIFICANT CHANGE UP
BLOOD GAS SOURCE: SIGNIFICANT CHANGE UP
BUN SERPL-MCNC: 7 MG/DL — SIGNIFICANT CHANGE UP (ref 7–23)
CALCIUM SERPL-MCNC: 9.1 MG/DL — SIGNIFICANT CHANGE UP (ref 8.5–10.1)
CHLORIDE SERPL-SCNC: 104 MMOL/L — SIGNIFICANT CHANGE UP (ref 96–108)
CK MB BLD-MCNC: <3 % — SIGNIFICANT CHANGE UP (ref 0–3.5)
CK MB CFR SERPL CALC: <1 NG/ML — SIGNIFICANT CHANGE UP (ref 0.5–3.6)
CK SERPL-CCNC: 33 U/L — SIGNIFICANT CHANGE UP (ref 26–192)
CO2 SERPL-SCNC: 35 MMOL/L — HIGH (ref 22–31)
CREAT SERPL-MCNC: 0.65 MG/DL — SIGNIFICANT CHANGE UP (ref 0.5–1.3)
EOSINOPHIL # BLD AUTO: 0.16 K/UL — SIGNIFICANT CHANGE UP (ref 0–0.5)
EOSINOPHIL NFR BLD AUTO: 1.5 % — SIGNIFICANT CHANGE UP (ref 0–6)
GLUCOSE BLDC GLUCOMTR-MCNC: 150 MG/DL — HIGH (ref 70–99)
GLUCOSE BLDC GLUCOMTR-MCNC: 154 MG/DL — HIGH (ref 70–99)
GLUCOSE SERPL-MCNC: 105 MG/DL — HIGH (ref 70–99)
HCO3 BLDA-SCNC: 33 MMOL/L — HIGH (ref 21–29)
HCO3 BLDA-SCNC: 34 MMOL/L — HIGH (ref 21–29)
HCT VFR BLD CALC: 36.5 % — SIGNIFICANT CHANGE UP (ref 34.5–45)
HGB BLD-MCNC: 10.7 G/DL — LOW (ref 11.5–15.5)
HOROWITZ INDEX BLDA+IHG-RTO: 100 — SIGNIFICANT CHANGE UP
HOROWITZ INDEX BLDA+IHG-RTO: 21 — SIGNIFICANT CHANGE UP
IMM GRANULOCYTES NFR BLD AUTO: 0.8 % — SIGNIFICANT CHANGE UP (ref 0–1.5)
LACTATE SERPL-SCNC: 1.3 MMOL/L — SIGNIFICANT CHANGE UP (ref 0.7–2)
LYMPHOCYTES # BLD AUTO: 2.31 K/UL — SIGNIFICANT CHANGE UP (ref 1–3.3)
LYMPHOCYTES # BLD AUTO: 21.8 % — SIGNIFICANT CHANGE UP (ref 13–44)
MCHC RBC-ENTMCNC: 26.7 PG — LOW (ref 27–34)
MCHC RBC-ENTMCNC: 29.3 GM/DL — LOW (ref 32–36)
MCV RBC AUTO: 91 FL — SIGNIFICANT CHANGE UP (ref 80–100)
MONOCYTES # BLD AUTO: 0.71 K/UL — SIGNIFICANT CHANGE UP (ref 0–0.9)
MONOCYTES NFR BLD AUTO: 6.7 % — SIGNIFICANT CHANGE UP (ref 2–14)
NEUTROPHILS # BLD AUTO: 7.31 K/UL — SIGNIFICANT CHANGE UP (ref 1.8–7.4)
NEUTROPHILS NFR BLD AUTO: 68.8 % — SIGNIFICANT CHANGE UP (ref 43–77)
NRBC # BLD: 0 /100 WBCS — SIGNIFICANT CHANGE UP (ref 0–0)
PCO2 BLDA: 60 MMHG — HIGH (ref 32–46)
PCO2 BLDA: 63 MMHG — HIGH (ref 32–46)
PH BLD: 7.34 — LOW (ref 7.35–7.45)
PH BLD: 7.38 — SIGNIFICANT CHANGE UP (ref 7.35–7.45)
PLATELET # BLD AUTO: 204 K/UL — SIGNIFICANT CHANGE UP (ref 150–400)
PO2 BLDA: 289 MMHG — HIGH (ref 74–108)
PO2 BLDA: 53 MMHG — LOW (ref 74–108)
POTASSIUM SERPL-MCNC: 3.6 MMOL/L — SIGNIFICANT CHANGE UP (ref 3.5–5.3)
POTASSIUM SERPL-SCNC: 3.6 MMOL/L — SIGNIFICANT CHANGE UP (ref 3.5–5.3)
PROT SERPL-MCNC: 7.7 GM/DL — SIGNIFICANT CHANGE UP (ref 6–8.3)
RBC # BLD: 4.01 M/UL — SIGNIFICANT CHANGE UP (ref 3.8–5.2)
RBC # FLD: 17.5 % — HIGH (ref 10.3–14.5)
SAO2 % BLDA: 84 % — LOW (ref 92–96)
SAO2 % BLDA: 99 % — HIGH (ref 92–96)
SODIUM SERPL-SCNC: 142 MMOL/L — SIGNIFICANT CHANGE UP (ref 135–145)
TROPONIN I SERPL-MCNC: <.015 NG/ML — SIGNIFICANT CHANGE UP (ref 0.01–0.04)
WBC # BLD: 10.62 K/UL — HIGH (ref 3.8–10.5)
WBC # FLD AUTO: 10.62 K/UL — HIGH (ref 3.8–10.5)

## 2019-07-11 PROCEDURE — 99285 EMERGENCY DEPT VISIT HI MDM: CPT

## 2019-07-11 PROCEDURE — 70450 CT HEAD/BRAIN W/O DYE: CPT | Mod: 26

## 2019-07-11 PROCEDURE — 71045 X-RAY EXAM CHEST 1 VIEW: CPT | Mod: 26

## 2019-07-11 PROCEDURE — 71275 CT ANGIOGRAPHY CHEST: CPT | Mod: 26

## 2019-07-11 PROCEDURE — 99222 1ST HOSP IP/OBS MODERATE 55: CPT

## 2019-07-11 PROCEDURE — 99223 1ST HOSP IP/OBS HIGH 75: CPT

## 2019-07-11 PROCEDURE — 93010 ELECTROCARDIOGRAM REPORT: CPT

## 2019-07-11 RX ORDER — OLANZAPINE 15 MG/1
10 TABLET, FILM COATED ORAL
Refills: 0 | Status: DISCONTINUED | OUTPATIENT
Start: 2019-07-11 | End: 2019-07-15

## 2019-07-11 RX ORDER — PANTOPRAZOLE SODIUM 20 MG/1
40 TABLET, DELAYED RELEASE ORAL
Refills: 0 | Status: DISCONTINUED | OUTPATIENT
Start: 2019-07-11 | End: 2019-07-12

## 2019-07-11 RX ORDER — ARIPIPRAZOLE 15 MG/1
10 TABLET ORAL DAILY
Refills: 0 | Status: DISCONTINUED | OUTPATIENT
Start: 2019-07-11 | End: 2019-07-15

## 2019-07-11 RX ORDER — IBUPROFEN 200 MG
400 TABLET ORAL EVERY 8 HOURS
Refills: 0 | Status: DISCONTINUED | OUTPATIENT
Start: 2019-07-11 | End: 2019-07-15

## 2019-07-11 RX ORDER — IPRATROPIUM/ALBUTEROL SULFATE 18-103MCG
3 AEROSOL WITH ADAPTER (GRAM) INHALATION ONCE
Refills: 0 | Status: COMPLETED | OUTPATIENT
Start: 2019-07-11 | End: 2019-07-11

## 2019-07-11 RX ORDER — ACETAMINOPHEN 500 MG
650 TABLET ORAL EVERY 6 HOURS
Refills: 0 | Status: DISCONTINUED | OUTPATIENT
Start: 2019-07-11 | End: 2019-07-15

## 2019-07-11 RX ORDER — SERTRALINE 25 MG/1
100 TABLET, FILM COATED ORAL
Refills: 0 | Status: DISCONTINUED | OUTPATIENT
Start: 2019-07-11 | End: 2019-07-15

## 2019-07-11 RX ORDER — ALBUTEROL 90 UG/1
1 AEROSOL, METERED ORAL EVERY 4 HOURS
Refills: 0 | Status: DISCONTINUED | OUTPATIENT
Start: 2019-07-11 | End: 2019-07-15

## 2019-07-11 RX ORDER — TIOTROPIUM BROMIDE 18 UG/1
1 CAPSULE ORAL; RESPIRATORY (INHALATION) DAILY
Refills: 0 | Status: DISCONTINUED | OUTPATIENT
Start: 2019-07-11 | End: 2019-07-15

## 2019-07-11 RX ORDER — LACTOBACILLUS ACIDOPHILUS 100MM CELL
1 CAPSULE ORAL DAILY
Refills: 0 | Status: DISCONTINUED | OUTPATIENT
Start: 2019-07-11 | End: 2019-07-15

## 2019-07-11 RX ORDER — DIVALPROEX SODIUM 500 MG/1
500 TABLET, DELAYED RELEASE ORAL
Refills: 0 | Status: DISCONTINUED | OUTPATIENT
Start: 2019-07-11 | End: 2019-07-15

## 2019-07-11 RX ORDER — ASPIRIN/CALCIUM CARB/MAGNESIUM 324 MG
81 TABLET ORAL DAILY
Refills: 0 | Status: DISCONTINUED | OUTPATIENT
Start: 2019-07-11 | End: 2019-07-15

## 2019-07-11 RX ORDER — PIPERACILLIN AND TAZOBACTAM 4; .5 G/20ML; G/20ML
3.38 INJECTION, POWDER, LYOPHILIZED, FOR SOLUTION INTRAVENOUS ONCE
Refills: 0 | Status: COMPLETED | OUTPATIENT
Start: 2019-07-11 | End: 2019-07-11

## 2019-07-11 RX ORDER — KETOROLAC TROMETHAMINE 30 MG/ML
15 SYRINGE (ML) INJECTION ONCE
Refills: 0 | Status: DISCONTINUED | OUTPATIENT
Start: 2019-07-11 | End: 2019-07-11

## 2019-07-11 RX ORDER — IPRATROPIUM/ALBUTEROL SULFATE 18-103MCG
3 AEROSOL WITH ADAPTER (GRAM) INHALATION EVERY 6 HOURS
Refills: 0 | Status: DISCONTINUED | OUTPATIENT
Start: 2019-07-11 | End: 2019-07-15

## 2019-07-11 RX ORDER — PIPERACILLIN AND TAZOBACTAM 4; .5 G/20ML; G/20ML
3.38 INJECTION, POWDER, LYOPHILIZED, FOR SOLUTION INTRAVENOUS EVERY 8 HOURS
Refills: 0 | Status: DISCONTINUED | OUTPATIENT
Start: 2019-07-11 | End: 2019-07-14

## 2019-07-11 RX ORDER — HEPARIN SODIUM 5000 [USP'U]/ML
5000 INJECTION INTRAVENOUS; SUBCUTANEOUS EVERY 12 HOURS
Refills: 0 | Status: DISCONTINUED | OUTPATIENT
Start: 2019-07-11 | End: 2019-07-15

## 2019-07-11 RX ORDER — BENZTROPINE MESYLATE 1 MG
2 TABLET ORAL
Refills: 0 | Status: DISCONTINUED | OUTPATIENT
Start: 2019-07-11 | End: 2019-07-15

## 2019-07-11 RX ADMIN — Medication 3 MILLILITER(S): at 23:10

## 2019-07-11 RX ADMIN — OLANZAPINE 10 MILLIGRAM(S): 15 TABLET, FILM COATED ORAL at 17:52

## 2019-07-11 RX ADMIN — HEPARIN SODIUM 5000 UNIT(S): 5000 INJECTION INTRAVENOUS; SUBCUTANEOUS at 17:52

## 2019-07-11 RX ADMIN — Medication 2 MILLIGRAM(S): at 17:51

## 2019-07-11 RX ADMIN — Medication 3 MILLILITER(S): at 17:00

## 2019-07-11 RX ADMIN — Medication 3 MILLILITER(S): at 10:19

## 2019-07-11 RX ADMIN — Medication 15 MILLIGRAM(S): at 18:30

## 2019-07-11 RX ADMIN — Medication 3 MILLILITER(S): at 10:18

## 2019-07-11 RX ADMIN — DIVALPROEX SODIUM 500 MILLIGRAM(S): 500 TABLET, DELAYED RELEASE ORAL at 17:51

## 2019-07-11 RX ADMIN — Medication 15 MILLIGRAM(S): at 17:42

## 2019-07-11 RX ADMIN — PIPERACILLIN AND TAZOBACTAM 25 GRAM(S): 4; .5 INJECTION, POWDER, LYOPHILIZED, FOR SOLUTION INTRAVENOUS at 17:52

## 2019-07-11 RX ADMIN — Medication 1 TABLET(S): at 17:50

## 2019-07-11 RX ADMIN — Medication 40 MILLIGRAM(S): at 17:51

## 2019-07-11 RX ADMIN — Medication 110 MILLIGRAM(S): at 15:41

## 2019-07-11 RX ADMIN — PIPERACILLIN AND TAZOBACTAM 200 GRAM(S): 4; .5 INJECTION, POWDER, LYOPHILIZED, FOR SOLUTION INTRAVENOUS at 11:03

## 2019-07-11 RX ADMIN — SERTRALINE 100 MILLIGRAM(S): 25 TABLET, FILM COATED ORAL at 17:52

## 2019-07-11 NOTE — CHART NOTE - NSCHARTNOTEFT_GEN_A_CORE
Hospitalist Medicine NP      cc: requested by RN to examine pt.   c/o right upper tooth pain.   stated tooth cap came off? and Pain for 1 month.     PE:   General: NAD noted   ENT: no swelling noted, no discharges noted.  Appears to have cavity.       D/w Dr. Malinda Clark (Brother) that pt will need dental follow up upon d/c.   Meds reconciled.   vss      A/p: 59yo F w PMH of autism, schizophrenia, TB, history of collapsed lung from tuberculosis and only one functioning lung. Seen now for Tooth pain  Plan:   continue current treatment  Airway clearance vest ordered as pt uses at home for hx of lung collapse HO Bronchiectasis.   motrin prn for pain  Dental f/u upon d/c.       Time Spent: 30 mins

## 2019-07-11 NOTE — H&P ADULT - NSHPPHYSICALEXAM_GEN_ALL_CORE
ICU Vital Signs Last 24 Hrs  T(C): 37.2 (11 Jul 2019 11:05), Max: 37.9 (11 Jul 2019 07:40)  T(F): 98.9 (11 Jul 2019 11:05), Max: 100.2 (11 Jul 2019 07:40)  HR: 88 (11 Jul 2019 11:45) (84 - 100)  BP: 117/66 (11 Jul 2019 11:05) (113/62 - 120/75)  BP(mean): --  ABP: --  ABP(mean): --  RR: 17 (11 Jul 2019 11:05) (17 - 24)  SpO2: 95% (11 Jul 2019 11:45) (95% - 100%)  GENERAL: NAD well-developed  HEAD:  Atraumatic, Normocephalic  EYES: EOMI, PERRLA, conjunctiva and sclera clear  ENMT: No tonsillar erythema, exudates, or enlargement; Moist mucous membranes, Good dentition, No lesions  NECK: Supple, No JVD, Normal thyroid  NERVOUS SYSTEM:  Alert & Oriented X3, Good concentration; Motor Strength 5/5 B/L upper and lower extremities; DTRs 2+ intact and symmetric  CHEST/LUNG: Clear to percussion bilaterally; No rales, rhonchi, wheezing, or rubs  HEART: Regular rate and rhythm; No murmurs, rubs, or gallops  ABDOMEN: Soft, Nontender, Nondistended; Bowel sounds present  EXTREMITIES:  2+ Peripheral Pulses, No clubbing, cyanosis, or edema  LYMPH: No lymphadenopathy   SKIN: No rashes or lesions ICU Vital Signs Last 24 Hrs  T(C): 37.2 (11 Jul 2019 11:05), Max: 37.9 (11 Jul 2019 07:40)  T(F): 98.9 (11 Jul 2019 11:05), Max: 100.2 (11 Jul 2019 07:40)  HR: 88 (11 Jul 2019 11:45) (84 - 100)  BP: 117/66 (11 Jul 2019 11:05) (113/62 - 120/75)  BP(mean): --  ABP: --  ABP(mean): --  RR: 17 (11 Jul 2019 11:05) (17 - 24)  SpO2: 95% (11 Jul 2019 11:45) (95% - 100%)  GENERAL: NAD well-developed  HEAD:  Atraumatic, Normocephalic  EYES: EOMI, PERRLA, conjunctiva and sclera clear  ENMT: No tonsillar erythema, exudates, or enlargement; Moist mucous membranes, Good dentition, No lesions  NECK: Supple, No JVD, Normal thyroid  NERVOUS SYSTEM:  Alert & Oriented X3, Good concentration; Motor Strength 5/5 B/L upper and lower extremities; DTRs 2+ intact and symmetric  CHEST/LUNG: Clear to percussion bilaterally; No rales, rhonchi, wheezing, or rubs POSITIVE bronchial breath sounds on right   HEART: Regular rate and rhythm; No murmurs, rubs, or gallops  ABDOMEN: Soft, Nontender, Nondistended; Bowel sounds present  EXTREMITIES:  2+ Peripheral Pulses, No clubbing, cyanosis, or edema  LYMPH: No lymphadenopathy   SKIN: No rashes or lesions

## 2019-07-11 NOTE — ED PROVIDER NOTE - PROGRESS NOTE DETAILS
Pt ambulated to bathroom endorsed by dr griffin pending CT. Pt is ambulating back and forth to bathroom and took out her IV. no significant resp distress. brother called back who lives in [place], states sisters called as pt was sob. pt currently in [place]. ct with pna, for abx. d/w dr joseph for admission.

## 2019-07-11 NOTE — ED ADULT NURSE NOTE - NSIMPLEMENTINTERV_GEN_ALL_ED
Implemented All Fall Risk Interventions:  [place] to call system. Call bell, personal items and telephone within reach. Instruct patient to call for assistance. Room bathroom lighting operational. Non-slip footwear when patient is off stretcher. Physically safe environment: no spills, clutter or unnecessary equipment. Stretcher in lowest position, wheels locked, appropriate side rails in place. Provide visual cue, wrist band, yellow gown, etc. Monitor gait and stability. Monitor for mental status changes and reorient to person, place, and time. Review medications for side effects contributing to fall risk. Reinforce activity limits and safety measures with patient and family.

## 2019-07-11 NOTE — ED PROVIDER NOTE - PHYSICAL EXAMINATION
Gen: intermittently falling asleep, speaking in english and Estonian intermittently  Head: NC, AT, PERRL, EOMI, normal lids/conjunctiva  ENT: normal hearing, patent oropharynx, MMM  Neck: supple, no tenderness/meningismus, FROM, Trachea midline  Pulm: L CTA, R +rhonchi & rales  CV: RRR, no M/R/G, +dist pulses  Abd: soft, obese, NT/ND, +BS, no guarding/rebound tenderness  Mskel: no edema/erythema/cyanosis  Skin: no rash

## 2019-07-11 NOTE — ED PROVIDER NOTE - CARE PLAN
Principal Discharge DX:	SOB (shortness of breath)  Secondary Diagnosis:	Hypoxia  Secondary Diagnosis:	Hypercapnia

## 2019-07-11 NOTE — ED PROVIDER NOTE - NIH STROKE SCALE: 6B. MOTOR LEG, RIGHT, QM
(2) Some effort against gravity; leg falls to bed by 5 secs, but has some effort against gravity/pt not cooperative w exam

## 2019-07-11 NOTE — ED PROVIDER NOTE - OBJECTIVE STATEMENT
Pertinent PMH/PSH/FHx/SHx and Review of Systems contained within:    59yo F w PMH of autism, schizophrenia, TB, admitted to hospital for PNA 2mos ago presents to ED for eval of SOB.  EMS report they were called for SOB state no english speakers in home, pt brought to ED Pertinent PMH/PSH/FHx/SHx and Review of Systems contained within:    57yo F w PMH of autism, schizophrenia, TB, admitted to hospital for PNA 2mos ago presents to ED for eval of SOB.  EMS report they were called for SOB state no english speakers in home, pt brought to ED.  On arrival to ED pt is poor historian, states she feels "not good."  Mult attempts to reach family [phone], no answer, unable to leave msg. Pertinent PMH/PSH/FHx/SHx and Review of Systems contained within:    57yo F w PMH of autism, schizophrenia, TB, admitted to hospital for PNA 2mos ago presents to ED for eval of SOB.  EMS report they were called for SOB state no english speakers in home, pt brought to ED.  On arrival to ED pt is poor historian, states she feels "not good."  Mult attempts to reach family [phone], no answer, unable to leave msg.    brother: dr lamb [phone]

## 2019-07-11 NOTE — ED ADULT TRIAGE NOTE - CHIEF COMPLAINT QUOTE
BIBEMS for SOB . Per  Tracey. unable to communicate with patient. Pt Alert to person only noted to be lethargic, tremors noted to RUE unknown hx accept TB 15 years ago. BIBEMS for SOB . Per  Tracey. pt has incoherent speech unable to communicate. Pt Alert to person only noted to be lethargic, tremors present to RUE, mouth noted to be asymmetrical. pt observed moving all 4 extremities involuntarily.  unknown baseline and hx accept TB 15 years ago.

## 2019-07-11 NOTE — H&P ADULT - HISTORY OF PRESENT ILLNESS
57yo F w PMH of autism, schizophrenia, TB, admitted to hospital for PNA 2mos ago presents to ED for eval of SOB.  EMS report they were called for SOB state no english speakers in home, pt brought to ED.  On arrival to ED pt is poor historian, states she feels "not good."  Mult attempts to reach family [phone], no answer, unable to leave msg. 59yo F w PMH of autism, schizophrenia, TB, admitted to hospital for PNA 2mos ago presents to ED for eval of SOB.  EMS report they were called for SOB state no english speakers in home, pt brought to ED.  On arrival to ED pt is poor historian, states she feels "not good." patient only complain of toothach- family says that her cpap machine broke and they brought her in becaasue she was too short of breath - she has history of collapsed lung from tuberculosis and only one functioning lung

## 2019-07-11 NOTE — ED PROVIDER NOTE - CLINICAL SUMMARY MEDICAL DECISION MAKING FREE TEXT BOX
Pt present for eval of SOB, ABG w hypoxia & hypercapnea.  Pt baseline mental status unknown, initially not cooperating w neuro exam, later ambulatory in ED.  Pending CTA & plan admission for pt.  Patient care transitioned to incoming team.  All decisions regarding the progression of care will be made at their discretion.

## 2019-07-11 NOTE — CONSULT NOTE ADULT - ASSESSMENT
PATIENT JADEN STEIN  [id number]   1960 DOA 2019 DR CHRISTINA SIBLEY    Hospital course     58 f HO schizophremnia HO TB with ro lung collapse HO bectasis HO admission -2019 with copd ex was readmitted 2019 with dyspnea ams Pulm consulted 2019               PATIENT JADEN STEIN  07 332   1960 DOA 2019 DR CHRISTINA SIBLEY    PROBLEM/ASSESSMENT/RECOMMENDATIONS (A/R)     RESP FAILURE   2019 10a 1 734/63/289   2019 6a .21  738/60/53  A/R Avoid excess O2 Target po 90-95%   ABN CT CH   2019 cta ch   1) chr r lung volume loss and bectasis   2) Scattered calcific granulomas   3) L upper and lower lobe opacities some of which are nodular   4) R pl thickening and calcification   5) No pe    PNEUMONIA    ct ch L upper and lower lobe opacities some of which are nodular   A/R 2019 In view of becasis and as there is past ho tb will change levaquin to zosyn and doxy pending cultures   COPD   2019 agree with bd stroids Will change pred to solumed 40   ALTERED MENTAL STATE   SCHIZOPHRENIA     TIME SPENT Over 55 minutes aggregate care time spent on encounter; activities included   direct pt care, counseling and/or coordinating care reviewing notes, lab data/ imaging , discussion with multidisciplinary team/ pt /family. Risks, benefits, alternatives  discussed in detail

## 2019-07-11 NOTE — ED ADULT NURSE NOTE - CHIEF COMPLAINT QUOTE
BIBEMS for SOB . Per  Tracey. pt has incoherent speech unable to communicate. Pt Alert to person only noted to be lethargic, tremors present to RUE, mouth noted to be asymmetrical. pt observed moving all 4 extremities involuntarily.  unknown baseline and hx accept TB 15 years ago.

## 2019-07-11 NOTE — CONSULT NOTE ADULT - SUBJECTIVE AND OBJECTIVE BOX
Preliminary note based on available patient data was entered below in order to expedite patient management  Patient will be examined within the next few hours (and this note will be edited if so dictated by the then latest available data) Please note that  by the end of this day the below note should be considered as my final patient progress note for today     JADEN STEIN  07 332   1960 DOA 2019 DR CHRISTINA YORK    ALLERGY      nka   CONTACT          Other rebeca Francine [phone]     Initial evaluation/Pulmonary Critical Care consultation requested on  2019  by Dr York   from Dr Irvin   Patient examined chart reviewed    HOSPITAL ADMISSION   PATIENT CAME  FROM (if information available)        TYPE OF VISIT      Initial evaluation/Pulmonary Critical Care consultation requested on  2019  by Dr York   from Dr Irvin     REASON FOR VISIT  For list of problems evaluated/addressed, please see problem list in the note below     PATIENT JADEN STEIN  07 332   1960 DOA 2019 DR CHRISTINA YORK    PT DESCRIPTION       This section was excerpted from ER md note but was independently verified by me    · Chief Complaint: The patient is a 58y Female complaining of altered mental status.  · HPI Objective Statement: Pertinent PMH/PSH/FHx/SHx and Review of Systems contained within:    59yo F w PMH of autism, schizophrenia, TB, admitted to hospital for PNA 2mos ago presents to ED for eval of SOB.  EMS report they were called for SOB state no english speakers in home, pt brought to ED.  On arrival to ED pt is poor historian, states she feels "not good."  Mult attempts to reach family [phone], no answer, unable to leave msg.    brother: dr lamb [phone]    HIV:    HIV Status:  · Offered: Declined    ALLERGIES AND HOME MEDICATIONS:   Allergies:        Allergies:  No Known Allergies:     Home Medications:   * Patient Currently Takes Medications as of 22-May-2019 12:29 documented in Structured Notes  · pantoprazole 40 mg oral delayed release tablet: 1 tab(s) orally once a day (before a meal)  · tiotropium 18 mcg inhalation capsule: 1 cap(s) inhaled once a day  · predniSONE 10 mg oral tablet: 3 tab(s) oral - orally once a day x 3 days  2 tab(s) oral - orally once a day x 3 days  1 tab(s) oral - orally once a day x 3 days  · lactobacillus acidophilus oral capsule: 1 cap(s) orally once a day   · cefpodoxime 100 mg oral tablet: 1 tab(s) orally every 12 hours   · OLANZapine 10 mg oral tablet: 1 tab(s) orally 2 times a day  · ARIPiprazole 10 mg oral tablet: 1 tab(s) orally   · aspirin 81 mg oral delayed release tablet: 1 tab(s) orally once a day  · benztropine 2 mg oral tablet: 1 tab(s) orally 2 times a day  · sertraline 100 mg oral tablet: 1 tab(s) orally every 12 hours  · divalproex sodium 500 mg oral delayed release tablet: 1 tab(s) orally 2 times a day  · budesonide-formoterol 160 mcg-4.5 mcg/inh inhalation aerosol: 1 puff(s) inhaled 2 times a day     REVIEW OF SYSTEMS:    Review of Systems:  · UNABLE TO OBTAIN: Severe Illness/Injury  · Details: pt intermittently falling asleep    PHYSICAL EXAM:   · Physical Examination: Gen: intermittently falling asleep, speaking in english and Bulgarian intermittently  Head: NC, AT, PERRL, EOMI, normal lids/conjunctiva  ENT: normal hearing, patent oropharynx, MMM  Neck: supple, no tenderness/meningismus, FROM, Trachea midline  Pulm: L CTA, R +rhonchi & rales  CV: RRR, no M/R/G, +dist pulses  Abd: soft, obese, NT/ND, +BS, no guarding/rebound tenderness  Mskel: no edema/erythema/cyanosis  Skin: no rash  · NEUROLOGICAL: - - -  · NIH 1a. Level of Consciousness: (1) Not alert; but arousable by minor stimulation to obey, answer, or respond  · NIH 1b. LOC Questions: (0) Answers both questions correctly  · NIH 1c. LOC Commands: (0) Performs both tasks correctly  · NIH 2. Best Gaze: (0) Normal  · NIH 3. Visual: (0) No visual loss  · NIH 4. Facial Palsy: (1) Minor paralysis (flattened nasolabial fold, asymmetry on smiling)  · NIH 5a. Motor Arm, Left: (0) No drift; limb holds 90 (or 45) degrees for full 10 secs  · NIH 5b. Motor Arm, Right: (0) No drift; limb holds 90 (or 45) degrees for full 10 secs  · NIH 6a. Motor Leg, Left: (2) Some effort against gravity; leg falls to bed by 5 secs, but has some effort against gravity  pt not cooperative w exam  · NIH 6b. Motor Leg, Right: (2) Some effort against gravity; leg falls to bed by 5 secs, but has some effort against gravity  pt not cooperative w exam  · NIH 7. Limb Ataxia: (1) Present in one limb  · NIH 8. Sensory: (0) Normal; no sensory loss  · NIH 9. Best Language: (0) No aphasia; normal  · NIH 10. Dysarthria: (1) Mild-to-moderate dysarthria; patient slurs at least some words and, at worst, can be understood with some difficulty  · NIH 11. Extinction and Inattention (formally neglect): (0) No abnormality  · NIH Stroke Scale: Total: 8    PATIENT JADEN STEIN  07 332   1960 DOA 2019 DR CHRISTINA YORK    VITALS/LABS       2019 W 10.6 Hb 10.7 Plt 204 Na 142 K 3.6 CO2 35   2019 la 1.3   2019 tr 1 n   2019 10a 1 734/63/289   2019 6a .21  738/60/53  2019 cta ch   1) chr r lung volume loss and bectasis   2) Scattered calcific granulomas   3) L upper and lower lobe opacities some of which are nodular   4) R pl thickening and calcification   5) No pe      PATIENT  JADEN STEIN  07 332   1960 DOA 2019 DR CHRISTINA YORK    MEDICATIONS     CAD   asa 81 ()   ABIO   levaquin ()  COPD   duoneb.4 ()   spriva ()   Pred 40 ()   PSYCH  aripiprazole 10 ()   benztropine 2.2 ()   depakote 500.2 ()   sertraline 100.2 ()   olanzapine 10.2 ()   GASTRITIS   protonix 40 ()      GLOBAL ISSUE/BEST PRACTICE:      PROBLEM: HOB elevation:   y            PROBLEM: Stress ulcer proph:    na                      PROBLEM: VTE prophylaxis:     hpsc ()  PROBLEM: Glycemic control:    na  PROBLEM: Nutrition:   reg ()   PROBLEM: Advanced directive: na     PROBLEM: Allergies:  na

## 2019-07-11 NOTE — H&P ADULT - ASSESSMENT
58f with history of collapsed right lung from tb with nonfunctioning bipap machine at home brought in for acute respiratory failure     IMPROVE VTE Individual Risk Assessment        RISK                                                          Points  [  ] Previous VTE                                                3  [  ] Thrombophilia                                             2  [  ] Lower limb paralysis                                   2        (unable to hold up >15 seconds)    [  ] Current Cancer                                            2         (within 6 months)  [  ] Immobilization > 24 hrs                              1  [  ] ICU/CCU stay > 24 hours                            1  [  ] Age > 60                                                    1  IMPROVE VTE Score ______0___

## 2019-07-12 LAB
ALBUMIN SERPL ELPH-MCNC: 2.8 G/DL — LOW (ref 3.3–5)
ALP SERPL-CCNC: 90 U/L — SIGNIFICANT CHANGE UP (ref 40–120)
ALT FLD-CCNC: 15 U/L — SIGNIFICANT CHANGE UP (ref 12–78)
ANION GAP SERPL CALC-SCNC: 5 MMOL/L — SIGNIFICANT CHANGE UP (ref 5–17)
APPEARANCE UR: CLEAR — SIGNIFICANT CHANGE UP
AST SERPL-CCNC: 9 U/L — LOW (ref 15–37)
BACTERIA # UR AUTO: ABNORMAL
BASE EXCESS BLDA CALC-SCNC: 10.7 MMOL/L — HIGH (ref -2–2)
BASE EXCESS BLDA CALC-SCNC: 6.2 MMOL/L — HIGH (ref -2–2)
BASE EXCESS BLDA CALC-SCNC: 7.2 MMOL/L — HIGH (ref -2–2)
BILIRUB SERPL-MCNC: 0.3 MG/DL — SIGNIFICANT CHANGE UP (ref 0.2–1.2)
BILIRUB UR-MCNC: NEGATIVE — SIGNIFICANT CHANGE UP
BLOOD GAS COMMENTS: SIGNIFICANT CHANGE UP
BLOOD GAS SOURCE: SIGNIFICANT CHANGE UP
BUN SERPL-MCNC: 13 MG/DL — SIGNIFICANT CHANGE UP (ref 7–23)
CALCIUM SERPL-MCNC: 8.7 MG/DL — SIGNIFICANT CHANGE UP (ref 8.5–10.1)
CHLORIDE SERPL-SCNC: 102 MMOL/L — SIGNIFICANT CHANGE UP (ref 96–108)
CO2 SERPL-SCNC: 35 MMOL/L — HIGH (ref 22–31)
COLOR SPEC: YELLOW — SIGNIFICANT CHANGE UP
CREAT SERPL-MCNC: 0.65 MG/DL — SIGNIFICANT CHANGE UP (ref 0.5–1.3)
DIFF PNL FLD: NEGATIVE — SIGNIFICANT CHANGE UP
EPI CELLS # UR: SIGNIFICANT CHANGE UP
GLUCOSE SERPL-MCNC: 151 MG/DL — HIGH (ref 70–99)
GLUCOSE UR QL: NEGATIVE MG/DL — SIGNIFICANT CHANGE UP
HCO3 BLDA-SCNC: 36 MMOL/L — HIGH (ref 21–29)
HCO3 BLDA-SCNC: 36 MMOL/L — HIGH (ref 21–29)
HCO3 BLDA-SCNC: 38 MMOL/L — HIGH (ref 21–29)
HCT VFR BLD CALC: 35.7 % — SIGNIFICANT CHANGE UP (ref 34.5–45)
HCV AB S/CO SERPL IA: 0.16 S/CO — SIGNIFICANT CHANGE UP (ref 0–0.99)
HCV AB SERPL-IMP: SIGNIFICANT CHANGE UP
HGB BLD-MCNC: 10.5 G/DL — LOW (ref 11.5–15.5)
HOROWITZ INDEX BLDA+IHG-RTO: 0.3 — SIGNIFICANT CHANGE UP
HOROWITZ INDEX BLDA+IHG-RTO: 25 — SIGNIFICANT CHANGE UP
HOROWITZ INDEX BLDA+IHG-RTO: 50 — SIGNIFICANT CHANGE UP
INR BLD: 1.15 RATIO — SIGNIFICANT CHANGE UP (ref 0.88–1.16)
KETONES UR-MCNC: ABNORMAL
LEUKOCYTE ESTERASE UR-ACNC: NEGATIVE — SIGNIFICANT CHANGE UP
MCHC RBC-ENTMCNC: 27.3 PG — SIGNIFICANT CHANGE UP (ref 27–34)
MCHC RBC-ENTMCNC: 29.4 GM/DL — LOW (ref 32–36)
MCV RBC AUTO: 93 FL — SIGNIFICANT CHANGE UP (ref 80–100)
MRSA PCR RESULT.: SIGNIFICANT CHANGE UP
NITRITE UR-MCNC: NEGATIVE — SIGNIFICANT CHANGE UP
NRBC # BLD: 0 /100 WBCS — SIGNIFICANT CHANGE UP (ref 0–0)
PCO2 BLDA: 69 MMHG — HIGH (ref 32–46)
PCO2 BLDA: 80 MMHG — CRITICAL HIGH (ref 32–46)
PCO2 BLDA: 82 MMHG — CRITICAL HIGH (ref 32–46)
PH BLD: 7.26 — LOW (ref 7.35–7.45)
PH BLD: 7.28 — LOW (ref 7.35–7.45)
PH BLD: 7.36 — SIGNIFICANT CHANGE UP (ref 7.35–7.45)
PH UR: 6 — SIGNIFICANT CHANGE UP (ref 5–8)
PHOSPHATE SERPL-MCNC: 4.8 MG/DL — HIGH (ref 2.5–4.5)
PLATELET # BLD AUTO: 222 K/UL — SIGNIFICANT CHANGE UP (ref 150–400)
PO2 BLDA: 192 MMHG — HIGH (ref 74–108)
PO2 BLDA: 75 MMHG — SIGNIFICANT CHANGE UP (ref 74–108)
PO2 BLDA: 94 MMHG — SIGNIFICANT CHANGE UP (ref 74–108)
POTASSIUM SERPL-MCNC: 4.6 MMOL/L — SIGNIFICANT CHANGE UP (ref 3.5–5.3)
POTASSIUM SERPL-SCNC: 4.6 MMOL/L — SIGNIFICANT CHANGE UP (ref 3.5–5.3)
PROCALCITONIN SERPL-MCNC: 0.05 NG/ML — SIGNIFICANT CHANGE UP (ref 0.02–0.1)
PROT SERPL-MCNC: 7.7 GM/DL — SIGNIFICANT CHANGE UP (ref 6–8.3)
PROT UR-MCNC: 30 MG/DL
PROTHROM AB SERPL-ACNC: 12.9 SEC — SIGNIFICANT CHANGE UP (ref 10–12.9)
RBC # BLD: 3.84 M/UL — SIGNIFICANT CHANGE UP (ref 3.8–5.2)
RBC # FLD: 17.6 % — HIGH (ref 10.3–14.5)
RBC CASTS # UR COMP ASSIST: SIGNIFICANT CHANGE UP /HPF (ref 0–4)
S AUREUS DNA NOSE QL NAA+PROBE: DETECTED
SAO2 % BLDA: 83 % — LOW (ref 92–96)
SAO2 % BLDA: 93 % — SIGNIFICANT CHANGE UP (ref 92–96)
SAO2 % BLDA: 98 % — HIGH (ref 92–96)
SODIUM SERPL-SCNC: 142 MMOL/L — SIGNIFICANT CHANGE UP (ref 135–145)
SP GR SPEC: 1.01 — SIGNIFICANT CHANGE UP (ref 1.01–1.02)
UROBILINOGEN FLD QL: NEGATIVE MG/DL — SIGNIFICANT CHANGE UP
WBC # BLD: 10.75 K/UL — HIGH (ref 3.8–10.5)
WBC # FLD AUTO: 10.75 K/UL — HIGH (ref 3.8–10.5)
WBC UR QL: SIGNIFICANT CHANGE UP

## 2019-07-12 PROCEDURE — 99232 SBSQ HOSP IP/OBS MODERATE 35: CPT

## 2019-07-12 PROCEDURE — 99233 SBSQ HOSP IP/OBS HIGH 50: CPT

## 2019-07-12 RX ORDER — PANTOPRAZOLE SODIUM 20 MG/1
40 TABLET, DELAYED RELEASE ORAL DAILY
Refills: 0 | Status: DISCONTINUED | OUTPATIENT
Start: 2019-07-12 | End: 2019-07-15

## 2019-07-12 RX ADMIN — Medication 2 MILLIGRAM(S): at 17:11

## 2019-07-12 RX ADMIN — DIVALPROEX SODIUM 500 MILLIGRAM(S): 500 TABLET, DELAYED RELEASE ORAL at 17:10

## 2019-07-12 RX ADMIN — PANTOPRAZOLE SODIUM 40 MILLIGRAM(S): 20 TABLET, DELAYED RELEASE ORAL at 11:20

## 2019-07-12 RX ADMIN — Medication 110 MILLIGRAM(S): at 06:04

## 2019-07-12 RX ADMIN — OLANZAPINE 10 MILLIGRAM(S): 15 TABLET, FILM COATED ORAL at 17:11

## 2019-07-12 RX ADMIN — Medication 3 MILLILITER(S): at 17:07

## 2019-07-12 RX ADMIN — PIPERACILLIN AND TAZOBACTAM 25 GRAM(S): 4; .5 INJECTION, POWDER, LYOPHILIZED, FOR SOLUTION INTRAVENOUS at 17:11

## 2019-07-12 RX ADMIN — Medication 40 MILLIGRAM(S): at 13:14

## 2019-07-12 RX ADMIN — Medication 2 MILLIGRAM(S): at 06:19

## 2019-07-12 RX ADMIN — HEPARIN SODIUM 5000 UNIT(S): 5000 INJECTION INTRAVENOUS; SUBCUTANEOUS at 17:10

## 2019-07-12 RX ADMIN — PIPERACILLIN AND TAZOBACTAM 25 GRAM(S): 4; .5 INJECTION, POWDER, LYOPHILIZED, FOR SOLUTION INTRAVENOUS at 10:06

## 2019-07-12 RX ADMIN — HEPARIN SODIUM 5000 UNIT(S): 5000 INJECTION INTRAVENOUS; SUBCUTANEOUS at 06:04

## 2019-07-12 RX ADMIN — Medication 3 MILLILITER(S): at 05:04

## 2019-07-12 RX ADMIN — DIVALPROEX SODIUM 500 MILLIGRAM(S): 500 TABLET, DELAYED RELEASE ORAL at 06:19

## 2019-07-12 RX ADMIN — Medication 3 MILLILITER(S): at 11:22

## 2019-07-12 RX ADMIN — Medication 3 MILLILITER(S): at 23:22

## 2019-07-12 RX ADMIN — Medication 110 MILLIGRAM(S): at 22:12

## 2019-07-12 RX ADMIN — Medication 40 MILLIGRAM(S): at 22:12

## 2019-07-12 RX ADMIN — Medication 40 MILLIGRAM(S): at 06:05

## 2019-07-12 RX ADMIN — SERTRALINE 100 MILLIGRAM(S): 25 TABLET, FILM COATED ORAL at 17:10

## 2019-07-12 RX ADMIN — PIPERACILLIN AND TAZOBACTAM 25 GRAM(S): 4; .5 INJECTION, POWDER, LYOPHILIZED, FOR SOLUTION INTRAVENOUS at 01:39

## 2019-07-12 NOTE — CONSULT NOTE ADULT - ASSESSMENT
58f with history of collapsed right lung from tb with nonfunctioning BiPAP machine at home brought in for acute respiratory failure and emphysema, and hypercapnia.  RRT last night.  Placed on BiPAP settings changed.  Solumedrol increased.  Episode of somnolence resolved.     - continue present medical management   - continue BiPAP and steroids   - No need for ICU at this time.  Please reconsult if condition changes. Thank you for this consult.

## 2019-07-12 NOTE — CHART NOTE - NSCHARTNOTEFT_GEN_A_CORE
RRT called for pt with increased lethargy on bipap.  Pt received routine meds cogentin and depakote earlier in the evening.    Pt found supine in bed, bipap in place  /89 HR 75 RR 16 O2 100%  CV: rrr  lungs: poor inspiratory effort but otherwise clear  abd: soft, ntnd  ext: no edema  neuro: responsive to painful stimuli initially, awake and alert moving all 4 extremities within several minutes  bsfs 154    ABG - ( 2019 23:55 )  pH, Arterial: x     pH, Blood: 7.26  /  pCO2: 82    /  pO2: 192   / HCO3: 36    / Base Excess: 6.2   /  SaO2: 98          A/P:  HPI:  This is a 59yo F h/o autism, schizophrenia, TB, recent PNA admitted for SOB i/s/o broken cpap machine at home.  Pt now with increased lethargy i/s/o elevated pCO2 and/or sedating meds.  Bipap adjusted with inceased IPAP and decreased FiO2.    repeat AB.28/80/83/94% --> bipap adjusted with IPAP increased to 18 and FiO2 decreased to 25%    Pt remains sleeping comfortably.  Will repeat ABG in am. Pancultures collected for hypothermia.

## 2019-07-12 NOTE — CHART NOTE - NSCHARTNOTEFT_GEN_A_CORE
Hospitalist Medicine DNP    CC: Somnolence, and abnormal blood gas    HPI: 59yo F w PMH of autism, schizophrenia, TB, admitted to hospital for PNA 2mos ago presents to ED for eval of SOB.  EMS report they were called for SOB state no english speakers in home, pt brought to ED.  On arrival to ED pt is poor historian, states she feels "not good." patient only complain of toothach- family says that her cpap machine broke and they brought her in [facility] she was too short of breath - she has history of collapsed lung from tuberculosis and only one functioning lung (11 Jul 2019 12:15)    Seen pt now on Bipap, responding to deep painful stimuli. Repeat ABG just done.   pH 7.35  pCO2 69.3  HCO3 38    Vital Signs Last 24 Hrs  T(C): 35.8 (12 Jul 2019 05:43), Max: 37.2 (11 Jul 2019 11:05)  T(F): 96.4 (12 Jul 2019 05:43), Max: 98.9 (11 Jul 2019 11:05)  HR: 80 (12 Jul 2019 05:43) (68 - 91)  BP: 125/57 (12 Jul 2019 05:43) (107/58 - 141/89)  BP(mean): --  RR: 18 (12 Jul 2019 05:43) (14 - 20)  SpO2: 94% (12 Jul 2019 05:43) (93% - 100%)    REVIEW OF SYSTEMS:  Pt somnolence.     PHYSICAL EXAM:  GENERAL: on BIPAP  NERVOUS SYSTEM:  responding only to deep stimuli  CHEST/LUNG: poor air entry  HEART: Regular rate and rhythm; No murmurs, rubs, or gallops  ABDOMEN: Soft, Nontender, Nondistended; Bowel sounds present  EXTREMITIES:  2+ Peripheral Pulses, No clubbing, cyanosis, or edema    Assessment: Patient 58y with PMHx Schizophrenia, Tuberculosis, Lung disease, Autism, admitted with SHORTNESS OF BREATH/ HYPOXIA/ HYPERCAPNIA  DIFFICULTY BREATHING seen for abnormal abg and Somnolence. d/w Dr lamb [phone] (Brother); pt is full code. as per family pt responds with BIpap settings in past.     Plan:  - Continue current treatment  -bipap adjusted.   - transfer to tele for close monitoring.   - D/w Dr. Irvin  aware and agree with the plan  - will continue to follow up    Time Spent: 60mins Hospitalist Medicine DNP    CC: Somnolence, and abnormal blood gas    HPI: 57yo F w PMH of autism, schizophrenia, TB, admitted to hospital for PNA 2mos ago presents to ED for eval of SOB.  EMS report they were called for SOB state no english speakers in home, pt brought to ED.  On arrival to ED pt is poor historian, states she feels "not good." patient only complain of toothach- family says that her cpap machine broke and they brought her in [facility] she was too short of breath - she has history of collapsed lung from tuberculosis and only one functioning lung (11 Jul 2019 12:15)    Seen pt now on Bipap, responding to deep painful stimuli. Repeat ABG just done.   pH 7.35  pCO2 69.3  HCO3 38    Vital Signs Last 24 Hrs  T(C): 35.8 (12 Jul 2019 05:43), Max: 37.2 (11 Jul 2019 11:05)  T(F): 96.4 (12 Jul 2019 05:43), Max: 98.9 (11 Jul 2019 11:05)  HR: 80 (12 Jul 2019 05:43) (68 - 91)  BP: 125/57 (12 Jul 2019 05:43) (107/58 - 141/89)  BP(mean): --  RR: 18 (12 Jul 2019 05:43) (14 - 20)  SpO2: 94% (12 Jul 2019 05:43) (93% - 100%)    REVIEW OF SYSTEMS:  Pt somnolence.     PHYSICAL EXAM:  GENERAL: on BIPAP  NERVOUS SYSTEM:  responding only to deep stimuli  CHEST/LUNG: poor air entry  HEART: Regular rate and rhythm; No murmurs, rubs, or gallops  ABDOMEN: Soft, Nontender, Nondistended; Bowel sounds present  EXTREMITIES:  2+ Peripheral Pulses, No clubbing, cyanosis, or edema    Assessment: Patient 58y with PMHx Schizophrenia, Tuberculosis, Lung disease, Autism, admitted with SHORTNESS OF BREATH/ HYPOXIA/ HYPERCAPNIA  DIFFICULTY BREATHING seen for abnormal abg and Somnolence. d/w Dr lamb [phone] (Brother); pt is full code. as per family pt responds with BIpap settings in past.     Plan:  - Continue current treatment  -bipap adjusted.   - transfer to tele for close monitoring.   - D/w Dr. Irvin  aware and agree with the plan  -ICU/CCU consult  - will continue to follow up    Time Spent: 60mins

## 2019-07-12 NOTE — CONSULT NOTE ADULT - SUBJECTIVE AND OBJECTIVE BOX
Patient is a 58y old  Female who presents with a chief complaint of short of breath (2019 12:56)      HPI:  59yo F w PMH of autism, schizophrenia, TB, admitted to hospital for PNA 2mos ago presents to ED for eval of SOB.  EMS report they were called for SOB state no english speakers in home, pt brought to ED.  On arrival to ED pt is poor historian, states she feels "not good." patient only complain of toothach- family says that her cpap machine broke and they brought her in becaasue she was too short of breath - she has history of collapsed lung from tuberculosis and only one functioning lung (2019 12:15)     Overnight: patient had a rapid response because of lethargy  Pulmonary consult called and patient placed on BiPAP with adjusted setting . This am patient somnolent. BiPAP adjusted  ABG revealed decrease in Pco2 patient back to baseline.  Pt transferred to tele.  Patient seen and examined. More awake, interacting with family,  remains on BiPAP.     Allergies    No Known Allergies      MEDICATIONS  (STANDING):  ALBUTerol   90 MICROgram(s) HFA Inhaler 1 Puff(s) Inhalation every 4 hours  ALBUTerol/ipratropium for Nebulization 3 milliLiter(s) Nebulizer every 6 hours  ARIPiprazole 10 milliGRAM(s) Oral daily  aspirin enteric coated 81 milliGRAM(s) Oral daily  benztropine 2 milliGRAM(s) Oral two times a day  diVALproex  milliGRAM(s) Oral two times a day  doxycycline IVPB 100 milliGRAM(s) IV Intermittent every 12 hours  doxycycline IVPB      heparin  Injectable 5000 Unit(s) SubCutaneous every 12 hours  lactobacillus acidophilus 1 Tablet(s) Oral daily  methylPREDNISolone sodium succinate Injectable 40 milliGRAM(s) IV Push every 8 hours  OLANZapine 10 milliGRAM(s) Oral two times a day  pantoprazole  Injectable 40 milliGRAM(s) IV Push daily  piperacillin/tazobactam IVPB.. 3.375 Gram(s) IV Intermittent every 8 hours  sertraline 100 milliGRAM(s) Oral two times a day  tiotropium 18 MICROgram(s) Capsule 1 Capsule(s) Inhalation daily    MEDICATIONS  (PRN):  acetaminophen   Tablet .. 650 milliGRAM(s) Oral every 6 hours PRN Mild Pain (1 - 3)  ibuprofen  Tablet. 400 milliGRAM(s) Oral every 8 hours PRN Severe Pain (7 - 10)        Drug Dosing Weight  Height (cm): 167.64 (2019 03:28)  Weight (kg): 72.6 (2019 03:28)  BMI (kg/m2): 25.8 (2019 03:28)  BSA (m2): 1.82 (2019 03:28)    PAST MEDICAL & SURGICAL HISTORY:  Schizophrenia  Tuberculosis  Lung disease  Autism  Hypoxia  No significant past surgical history    FAMILY HISTORY:  No pertinent family history in first degree relatives      REVIEW OF SYSTEMS:      Vital Signs Last 24 Hrs  T(C): 36.4 (2019 17:09), Max: 36.4 (2019 17:09)  T(F): 97.5 (2019 17:09), Max: 97.5 (2019 17:09)  HR: 84 (2019 17:58) (63 - 95)  BP: 117/67 (2019 17:09) (110/65 - 143/84)  RR: 18 (2019 17:09) (14 - 20)  SpO2: 95% (2019 17:58) (92% - 100%)        Blood Gas Profile - Arterial (19 @ 08:29)    Blood Gas Source: Arterial    Blood Gas Comments: site to stop bleeding,+allens test,pt.very lethargic.    pH, Blood: 7.36    pCO2, Arterial: 69 mmHg    pO2, Arterial: 75 mmHg    HCO3, Arterial: 38 mmol/L    Base Excess, Arterial: 10.7 mmol/L    Oxygen Saturation, Arterial: 93 %    FIO2, Arterial: 25.0      I&O's Detail    2019 07:01  -  2019 18:30  --------------------------------------------------------  IN:    Oral Fluid: 600 mL  Total IN: 600 mL    OUT:  Total OUT: 0 mL    Total NET: 600 mL      PHYSICAL EXAM:    GENERAL: awake,   HEAD:  Atraumatic, Normocephalic  EYES: EOMI, PERRLA,  ENMT: BIPAP in place  NECK: Supple, No JVD,  NERVOUS SYSTEM:  GRADY's  CHEST/LUNG: CTA no wheezing  HEART: s1s2  ABDOMEN: Soft, Nontender, Nondistended;  EXTREMITIES:  2+ Peripheral Pulses, No clubbing, cyanosis, or edema      LABS:  CBC Full  -  ( 2019 01:30 )  WBC Count : 10.75 K/uL  RBC Count : 3.84 M/uL  Hemoglobin : 10.5 g/dL  Hematocrit : 35.7 %  Platelet Count - Automated : 222 K/uL  Mean Cell Volume : 93.0 fl  Mean Cell Hemoglobin : 27.3 pg  Mean Cell Hemoglobin Concentration : 29.4 gm/dL  Auto Neutrophil # : x  Auto Lymphocyte # : x  Auto Monocyte # : x  Auto Eosinophil # : x  Auto Basophil # : x  Auto Neutrophil % : x  Auto Lymphocyte % : x  Auto Monocyte % : x  Auto Eosinophil % : x  Auto Basophil % : x        142  |  102  |  13  ----------------------------<  151<H>  4.6   |  35<H>  |  0.65    Ca    8.7      2019 01:30  Phos  4.8     12    TPro  7.7  /  Alb  2.8<L>  /  TBili  0.3  /  DBili  x   /  AST  9<L>  /  ALT  15  /  AlkPhos  90  12    CAPILLARY BLOOD GLUCOSE      POCT Blood Glucose.: 154 mg/dL (2019 23:42)    PT/INR - ( 2019 01:30 )   PT: 12.9 sec;   INR: 1.15 ratio           Urinalysis Basic - ( 2019 00:36 )    Color: Yellow / Appearance: Clear / S.010 / pH: x  Gluc: x / Ketone: Trace  / Bili: Negative / Urobili: Negative mg/dL   Blood: x / Protein: 30 mg/dL / Nitrite: Negative   Leuk Esterase: Negative / RBC: 0-2 /HPF / WBC 0-2   Sq Epi: x / Non Sq Epi: Few / Bacteria: Occasional      CARDIAC MARKERS ( 2019 05:44 )  <.015 ng/mL / x     / 33 U/L / x     / <1.0 ng/mL    Culture Results:   No growth to date. ( @ 16:20)  Culture Results:   No growth to date. ( @ 16:20)

## 2019-07-13 LAB
ALBUMIN SERPL ELPH-MCNC: 2.5 G/DL — LOW (ref 3.3–5)
ALP SERPL-CCNC: 80 U/L — SIGNIFICANT CHANGE UP (ref 40–120)
ALT FLD-CCNC: 14 U/L — SIGNIFICANT CHANGE UP (ref 12–78)
ANION GAP SERPL CALC-SCNC: 5 MMOL/L — SIGNIFICANT CHANGE UP (ref 5–17)
AST SERPL-CCNC: 15 U/L — SIGNIFICANT CHANGE UP (ref 15–37)
BASE EXCESS BLDA CALC-SCNC: 10.4 MMOL/L — HIGH (ref -2–2)
BILIRUB SERPL-MCNC: 0.3 MG/DL — SIGNIFICANT CHANGE UP (ref 0.2–1.2)
BLOOD GAS COMMENTS: SIGNIFICANT CHANGE UP
BLOOD GAS COMMENTS: SIGNIFICANT CHANGE UP
BLOOD GAS SOURCE: SIGNIFICANT CHANGE UP
BUN SERPL-MCNC: 21 MG/DL — SIGNIFICANT CHANGE UP (ref 7–23)
CALCIUM SERPL-MCNC: 8.5 MG/DL — SIGNIFICANT CHANGE UP (ref 8.5–10.1)
CHLORIDE SERPL-SCNC: 100 MMOL/L — SIGNIFICANT CHANGE UP (ref 96–108)
CO2 SERPL-SCNC: 36 MMOL/L — HIGH (ref 22–31)
CREAT SERPL-MCNC: 0.61 MG/DL — SIGNIFICANT CHANGE UP (ref 0.5–1.3)
CULTURE RESULTS: NO GROWTH — SIGNIFICANT CHANGE UP
GLUCOSE SERPL-MCNC: 138 MG/DL — HIGH (ref 70–99)
HCO3 BLDA-SCNC: 38 MMOL/L — HIGH (ref 21–29)
HCT VFR BLD CALC: 35.2 % — SIGNIFICANT CHANGE UP (ref 34.5–45)
HGB BLD-MCNC: 10.4 G/DL — LOW (ref 11.5–15.5)
HOROWITZ INDEX BLDA+IHG-RTO: 32 — SIGNIFICANT CHANGE UP
INR BLD: 1.13 RATIO — SIGNIFICANT CHANGE UP (ref 0.88–1.16)
MCHC RBC-ENTMCNC: 27.1 PG — SIGNIFICANT CHANGE UP (ref 27–34)
MCHC RBC-ENTMCNC: 29.5 GM/DL — LOW (ref 32–36)
MCV RBC AUTO: 91.7 FL — SIGNIFICANT CHANGE UP (ref 80–100)
NRBC # BLD: 0 /100 WBCS — SIGNIFICANT CHANGE UP (ref 0–0)
PCO2 BLDA: 70 MMHG — CRITICAL HIGH (ref 32–46)
PH BLD: 7.35 — SIGNIFICANT CHANGE UP (ref 7.35–7.45)
PHOSPHATE SERPL-MCNC: 2.9 MG/DL — SIGNIFICANT CHANGE UP (ref 2.5–4.5)
PLATELET # BLD AUTO: 258 K/UL — SIGNIFICANT CHANGE UP (ref 150–400)
PO2 BLDA: 98 MMHG — SIGNIFICANT CHANGE UP (ref 74–108)
POTASSIUM SERPL-MCNC: 4.5 MMOL/L — SIGNIFICANT CHANGE UP (ref 3.5–5.3)
POTASSIUM SERPL-SCNC: 4.5 MMOL/L — SIGNIFICANT CHANGE UP (ref 3.5–5.3)
PROT SERPL-MCNC: 7.2 GM/DL — SIGNIFICANT CHANGE UP (ref 6–8.3)
PROTHROM AB SERPL-ACNC: 12.7 SEC — SIGNIFICANT CHANGE UP (ref 10–12.9)
RBC # BLD: 3.84 M/UL — SIGNIFICANT CHANGE UP (ref 3.8–5.2)
RBC # FLD: 17.5 % — HIGH (ref 10.3–14.5)
SAO2 % BLDA: 96 % — SIGNIFICANT CHANGE UP (ref 92–96)
SODIUM SERPL-SCNC: 141 MMOL/L — SIGNIFICANT CHANGE UP (ref 135–145)
SPECIMEN SOURCE: SIGNIFICANT CHANGE UP
WBC # BLD: 11.56 K/UL — HIGH (ref 3.8–10.5)
WBC # FLD AUTO: 11.56 K/UL — HIGH (ref 3.8–10.5)

## 2019-07-13 PROCEDURE — 99233 SBSQ HOSP IP/OBS HIGH 50: CPT

## 2019-07-13 PROCEDURE — 99232 SBSQ HOSP IP/OBS MODERATE 35: CPT

## 2019-07-13 RX ADMIN — Medication 2 MILLIGRAM(S): at 06:29

## 2019-07-13 RX ADMIN — ARIPIPRAZOLE 10 MILLIGRAM(S): 15 TABLET ORAL at 13:44

## 2019-07-13 RX ADMIN — Medication 110 MILLIGRAM(S): at 14:52

## 2019-07-13 RX ADMIN — OLANZAPINE 10 MILLIGRAM(S): 15 TABLET, FILM COATED ORAL at 18:34

## 2019-07-13 RX ADMIN — DIVALPROEX SODIUM 500 MILLIGRAM(S): 500 TABLET, DELAYED RELEASE ORAL at 06:30

## 2019-07-13 RX ADMIN — PANTOPRAZOLE SODIUM 40 MILLIGRAM(S): 20 TABLET, DELAYED RELEASE ORAL at 13:44

## 2019-07-13 RX ADMIN — Medication 40 MILLIGRAM(S): at 06:30

## 2019-07-13 RX ADMIN — Medication 2 MILLIGRAM(S): at 18:34

## 2019-07-13 RX ADMIN — OLANZAPINE 10 MILLIGRAM(S): 15 TABLET, FILM COATED ORAL at 06:30

## 2019-07-13 RX ADMIN — Medication 81 MILLIGRAM(S): at 13:45

## 2019-07-13 RX ADMIN — DIVALPROEX SODIUM 500 MILLIGRAM(S): 500 TABLET, DELAYED RELEASE ORAL at 18:34

## 2019-07-13 RX ADMIN — Medication 3 MILLILITER(S): at 05:46

## 2019-07-13 RX ADMIN — Medication 3 MILLILITER(S): at 11:20

## 2019-07-13 RX ADMIN — Medication 3 MILLILITER(S): at 23:50

## 2019-07-13 RX ADMIN — Medication 1 TABLET(S): at 13:44

## 2019-07-13 RX ADMIN — PIPERACILLIN AND TAZOBACTAM 25 GRAM(S): 4; .5 INJECTION, POWDER, LYOPHILIZED, FOR SOLUTION INTRAVENOUS at 18:34

## 2019-07-13 RX ADMIN — HEPARIN SODIUM 5000 UNIT(S): 5000 INJECTION INTRAVENOUS; SUBCUTANEOUS at 18:34

## 2019-07-13 RX ADMIN — SERTRALINE 100 MILLIGRAM(S): 25 TABLET, FILM COATED ORAL at 06:30

## 2019-07-13 RX ADMIN — HEPARIN SODIUM 5000 UNIT(S): 5000 INJECTION INTRAVENOUS; SUBCUTANEOUS at 06:30

## 2019-07-13 RX ADMIN — SERTRALINE 100 MILLIGRAM(S): 25 TABLET, FILM COATED ORAL at 18:34

## 2019-07-13 RX ADMIN — Medication 3 MILLILITER(S): at 17:16

## 2019-07-13 RX ADMIN — PIPERACILLIN AND TAZOBACTAM 25 GRAM(S): 4; .5 INJECTION, POWDER, LYOPHILIZED, FOR SOLUTION INTRAVENOUS at 02:24

## 2019-07-13 RX ADMIN — Medication 40 MILLIGRAM(S): at 13:45

## 2019-07-13 RX ADMIN — PIPERACILLIN AND TAZOBACTAM 25 GRAM(S): 4; .5 INJECTION, POWDER, LYOPHILIZED, FOR SOLUTION INTRAVENOUS at 10:50

## 2019-07-14 PROCEDURE — 99232 SBSQ HOSP IP/OBS MODERATE 35: CPT

## 2019-07-14 PROCEDURE — 99233 SBSQ HOSP IP/OBS HIGH 50: CPT

## 2019-07-14 RX ADMIN — DIVALPROEX SODIUM 500 MILLIGRAM(S): 500 TABLET, DELAYED RELEASE ORAL at 05:24

## 2019-07-14 RX ADMIN — OLANZAPINE 10 MILLIGRAM(S): 15 TABLET, FILM COATED ORAL at 18:18

## 2019-07-14 RX ADMIN — Medication 81 MILLIGRAM(S): at 14:00

## 2019-07-14 RX ADMIN — SERTRALINE 100 MILLIGRAM(S): 25 TABLET, FILM COATED ORAL at 05:25

## 2019-07-14 RX ADMIN — ARIPIPRAZOLE 10 MILLIGRAM(S): 15 TABLET ORAL at 14:00

## 2019-07-14 RX ADMIN — Medication 3 MILLILITER(S): at 05:36

## 2019-07-14 RX ADMIN — Medication 3 MILLILITER(S): at 11:20

## 2019-07-14 RX ADMIN — Medication 2 MILLIGRAM(S): at 05:24

## 2019-07-14 RX ADMIN — Medication 1 TABLET(S): at 14:00

## 2019-07-14 RX ADMIN — OLANZAPINE 10 MILLIGRAM(S): 15 TABLET, FILM COATED ORAL at 05:25

## 2019-07-14 RX ADMIN — Medication 1 TABLET(S): at 21:44

## 2019-07-14 RX ADMIN — PIPERACILLIN AND TAZOBACTAM 25 GRAM(S): 4; .5 INJECTION, POWDER, LYOPHILIZED, FOR SOLUTION INTRAVENOUS at 10:42

## 2019-07-14 RX ADMIN — Medication 2 MILLIGRAM(S): at 18:17

## 2019-07-14 RX ADMIN — PANTOPRAZOLE SODIUM 40 MILLIGRAM(S): 20 TABLET, DELAYED RELEASE ORAL at 14:01

## 2019-07-14 RX ADMIN — SERTRALINE 100 MILLIGRAM(S): 25 TABLET, FILM COATED ORAL at 18:18

## 2019-07-14 RX ADMIN — Medication 3 MILLILITER(S): at 17:13

## 2019-07-14 RX ADMIN — Medication 110 MILLIGRAM(S): at 00:53

## 2019-07-14 RX ADMIN — HEPARIN SODIUM 5000 UNIT(S): 5000 INJECTION INTRAVENOUS; SUBCUTANEOUS at 18:17

## 2019-07-14 RX ADMIN — Medication 110 MILLIGRAM(S): at 13:59

## 2019-07-14 RX ADMIN — DIVALPROEX SODIUM 500 MILLIGRAM(S): 500 TABLET, DELAYED RELEASE ORAL at 18:18

## 2019-07-14 RX ADMIN — Medication 40 MILLIGRAM(S): at 05:24

## 2019-07-14 RX ADMIN — HEPARIN SODIUM 5000 UNIT(S): 5000 INJECTION INTRAVENOUS; SUBCUTANEOUS at 05:24

## 2019-07-14 RX ADMIN — PIPERACILLIN AND TAZOBACTAM 25 GRAM(S): 4; .5 INJECTION, POWDER, LYOPHILIZED, FOR SOLUTION INTRAVENOUS at 02:25

## 2019-07-15 ENCOUNTER — TRANSCRIPTION ENCOUNTER (OUTPATIENT)
Age: 59
End: 2019-07-15

## 2019-07-15 VITALS
DIASTOLIC BLOOD PRESSURE: 76 MMHG | SYSTOLIC BLOOD PRESSURE: 130 MMHG | HEART RATE: 100 BPM | TEMPERATURE: 97 F | OXYGEN SATURATION: 93 % | RESPIRATION RATE: 18 BRPM

## 2019-07-15 LAB
ALBUMIN SERPL ELPH-MCNC: 2.6 G/DL — LOW (ref 3.3–5)
ALP SERPL-CCNC: 78 U/L — SIGNIFICANT CHANGE UP (ref 40–120)
ALT FLD-CCNC: 14 U/L — SIGNIFICANT CHANGE UP (ref 12–78)
ANION GAP SERPL CALC-SCNC: 6 MMOL/L — SIGNIFICANT CHANGE UP (ref 5–17)
AST SERPL-CCNC: 8 U/L — LOW (ref 15–37)
BASE EXCESS BLDA CALC-SCNC: 9.4 MMOL/L — HIGH (ref -2–2)
BILIRUB SERPL-MCNC: 0.2 MG/DL — SIGNIFICANT CHANGE UP (ref 0.2–1.2)
BLOOD GAS COMMENTS: SIGNIFICANT CHANGE UP
BLOOD GAS SOURCE: SIGNIFICANT CHANGE UP
BUN SERPL-MCNC: 23 MG/DL — SIGNIFICANT CHANGE UP (ref 7–23)
CALCIUM SERPL-MCNC: 9.2 MG/DL — SIGNIFICANT CHANGE UP (ref 8.5–10.1)
CHLORIDE SERPL-SCNC: 101 MMOL/L — SIGNIFICANT CHANGE UP (ref 96–108)
CO2 SERPL-SCNC: 36 MMOL/L — HIGH (ref 22–31)
CREAT SERPL-MCNC: 0.76 MG/DL — SIGNIFICANT CHANGE UP (ref 0.5–1.3)
GLUCOSE SERPL-MCNC: 130 MG/DL — HIGH (ref 70–99)
HCO3 BLDA-SCNC: 36 MMOL/L — HIGH (ref 21–29)
HCT VFR BLD CALC: 36.6 % — SIGNIFICANT CHANGE UP (ref 34.5–45)
HGB BLD-MCNC: 10.6 G/DL — LOW (ref 11.5–15.5)
HOROWITZ INDEX BLDA+IHG-RTO: 21 — SIGNIFICANT CHANGE UP
INR BLD: 1.1 RATIO — SIGNIFICANT CHANGE UP (ref 0.88–1.16)
MCHC RBC-ENTMCNC: 27.3 PG — SIGNIFICANT CHANGE UP (ref 27–34)
MCHC RBC-ENTMCNC: 29 GM/DL — LOW (ref 32–36)
MCV RBC AUTO: 94.3 FL — SIGNIFICANT CHANGE UP (ref 80–100)
NRBC # BLD: 0 /100 WBCS — SIGNIFICANT CHANGE UP (ref 0–0)
PCO2 BLDA: 60 MMHG — HIGH (ref 32–46)
PH BLD: 7.39 — SIGNIFICANT CHANGE UP (ref 7.35–7.45)
PHOSPHATE SERPL-MCNC: 3 MG/DL — SIGNIFICANT CHANGE UP (ref 2.5–4.5)
PLATELET # BLD AUTO: 296 K/UL — SIGNIFICANT CHANGE UP (ref 150–400)
PO2 BLDA: 59 MMHG — LOW (ref 74–108)
POTASSIUM SERPL-MCNC: 3.8 MMOL/L — SIGNIFICANT CHANGE UP (ref 3.5–5.3)
POTASSIUM SERPL-SCNC: 3.8 MMOL/L — SIGNIFICANT CHANGE UP (ref 3.5–5.3)
PROT SERPL-MCNC: 7.1 GM/DL — SIGNIFICANT CHANGE UP (ref 6–8.3)
PROTHROM AB SERPL-ACNC: 12.3 SEC — SIGNIFICANT CHANGE UP (ref 10–12.9)
RBC # BLD: 3.88 M/UL — SIGNIFICANT CHANGE UP (ref 3.8–5.2)
RBC # FLD: 17.7 % — HIGH (ref 10.3–14.5)
SAO2 % BLDA: 89 % — LOW (ref 92–96)
SODIUM SERPL-SCNC: 143 MMOL/L — SIGNIFICANT CHANGE UP (ref 135–145)
WBC # BLD: 12.75 K/UL — HIGH (ref 3.8–10.5)
WBC # FLD AUTO: 12.75 K/UL — HIGH (ref 3.8–10.5)

## 2019-07-15 PROCEDURE — 99232 SBSQ HOSP IP/OBS MODERATE 35: CPT

## 2019-07-15 PROCEDURE — 99239 HOSP IP/OBS DSCHRG MGMT >30: CPT

## 2019-07-15 RX ORDER — BUDESONIDE AND FORMOTEROL FUMARATE DIHYDRATE 160; 4.5 UG/1; UG/1
1 AEROSOL RESPIRATORY (INHALATION)
Qty: 1 | Refills: 3
Start: 2019-07-15 | End: 2019-11-11

## 2019-07-15 RX ORDER — TIOTROPIUM BROMIDE 18 UG/1
1 CAPSULE ORAL; RESPIRATORY (INHALATION)
Qty: 30 | Refills: 3
Start: 2019-07-15 | End: 2019-11-11

## 2019-07-15 RX ORDER — ALBUTEROL 90 UG/1
1 AEROSOL, METERED ORAL
Qty: 1 | Refills: 0
Start: 2019-07-15 | End: 2019-08-13

## 2019-07-15 RX ORDER — ACETAMINOPHEN 500 MG
2 TABLET ORAL
Qty: 0 | Refills: 0 | DISCHARGE
Start: 2019-07-15

## 2019-07-15 RX ADMIN — Medication 3 MILLILITER(S): at 17:28

## 2019-07-15 RX ADMIN — OLANZAPINE 10 MILLIGRAM(S): 15 TABLET, FILM COATED ORAL at 05:30

## 2019-07-15 RX ADMIN — DIVALPROEX SODIUM 500 MILLIGRAM(S): 500 TABLET, DELAYED RELEASE ORAL at 17:15

## 2019-07-15 RX ADMIN — Medication 1 TABLET(S): at 11:22

## 2019-07-15 RX ADMIN — Medication 1 TABLET(S): at 17:15

## 2019-07-15 RX ADMIN — HEPARIN SODIUM 5000 UNIT(S): 5000 INJECTION INTRAVENOUS; SUBCUTANEOUS at 05:29

## 2019-07-15 RX ADMIN — PANTOPRAZOLE SODIUM 40 MILLIGRAM(S): 20 TABLET, DELAYED RELEASE ORAL at 11:22

## 2019-07-15 RX ADMIN — OLANZAPINE 10 MILLIGRAM(S): 15 TABLET, FILM COATED ORAL at 17:15

## 2019-07-15 RX ADMIN — Medication 81 MILLIGRAM(S): at 11:22

## 2019-07-15 RX ADMIN — Medication 3 MILLILITER(S): at 05:37

## 2019-07-15 RX ADMIN — Medication 2 MILLIGRAM(S): at 17:15

## 2019-07-15 RX ADMIN — Medication 3 MILLILITER(S): at 00:23

## 2019-07-15 RX ADMIN — Medication 100 MILLIGRAM(S): at 13:06

## 2019-07-15 RX ADMIN — SERTRALINE 100 MILLIGRAM(S): 25 TABLET, FILM COATED ORAL at 05:30

## 2019-07-15 RX ADMIN — Medication 1 TABLET(S): at 05:29

## 2019-07-15 RX ADMIN — Medication 40 MILLIGRAM(S): at 05:30

## 2019-07-15 RX ADMIN — Medication 2 MILLIGRAM(S): at 05:29

## 2019-07-15 RX ADMIN — Medication 100 MILLIGRAM(S): at 02:11

## 2019-07-15 RX ADMIN — Medication 3 MILLILITER(S): at 11:23

## 2019-07-15 RX ADMIN — DIVALPROEX SODIUM 500 MILLIGRAM(S): 500 TABLET, DELAYED RELEASE ORAL at 05:29

## 2019-07-15 RX ADMIN — SERTRALINE 100 MILLIGRAM(S): 25 TABLET, FILM COATED ORAL at 17:15

## 2019-07-15 RX ADMIN — ARIPIPRAZOLE 10 MILLIGRAM(S): 15 TABLET ORAL at 11:22

## 2019-07-15 NOTE — DISCHARGE NOTE PROVIDER - HOSPITAL COURSE
58 yrs old female with history of collapsed right lung from tb with nonfunctioning bipap machine at home brought in for acute respiratory failure . Found to have CO2 retention. pt refusing BIPAP use. has O2 at home. She was treated for possible PNA. stable to go home. follow up with primary pulmonologist.

## 2019-07-15 NOTE — PROGRESS NOTE ADULT - ASSESSMENT
PATIENT JADEN STEIN  07 332   1960 DOA 2019 DR CHRISTINA SIBLEY    Hospital course     58 f HO schizophremnia HO TB with ro lung collapse HO bectasis HO admission -2019 with copd ex was readmitted 2019 with dyspnea ams Pulm consulted 2019               PATIENT JADEN STEIN  07 332   1960 DOA 2019 DR CHRISTINA SIBLEY    PROBLEM/ASSESSMENT/RECOMMENDATIONS (A/R)     RESP FAILURE   2019 .32 735/70/98 3l  2019 8a bpap 18/6/.25 736/69/75   2019 2a 16/6/.3 728/80/94   2019 10a 1 734/63/289   2019 6a .21  738/60/53  A/R Avoid excess O2 Target po 90-95% 2019 8a abg acceptable   Pt refusing to wear bpap 2019 recd call from rt to dc as pt refuses   ABN CT CH   2019 cta ch   1) chr r lung volume loss and bectasis   2) Scattered calcific granulomas   3) L upper and lower lobe opacities some of which are nodular   4) R pl thickening and calcification   5) No pe    PNEUMONIA    W 10.7    blood culture n    urine culture n   2019 pc n     ct ch L upper and lower lobe opacities some of which are nodular   A/R   2019 Bact pneumonia seems unlikely Ig bc neg will deescalate abio   2019 In view of becasis and as there is past ho tb will change levaquin to zosyn and doxy pending cultures   2019 Changed zosyn to augmentin  COPD   2019 agree with bd stroids Will change pred to solumed 40   ALTERED MENTAL STATE   SCHIZOPHRENIA     TIME SPENT Over 25 minutes aggregate care time spent on encounter; activities included   direct pt care, counseling and/or coordinating care reviewing notes, lab data/ imaging , discussion with multidisciplinary team/ pt /family. Risks, benefits, alternatives  discussed in detail.
58f with history of collapsed right lung from tb with nonfunctioning bipap machine at home brought in for acute respiratory failure
58f with history of collapsed right lung from tb with nonfunctioning bipap machine at home brought in for acute respiratory failure and emphysema reten- solumedrol increased and bipap settings changed     IMPROVE VTE Individual Risk Assessment        RISK                                                          Points  [  ] Previous VTE                                                3  [  ] Thrombophilia                                             2  [  ] Lower limb paralysis                                   2        (unable to hold up >15 seconds)    [  ] Current Cancer                                            2         (within 6 months)  [  ] Immobilization > 24 hrs                              1  [  ] ICU/CCU stay > 24 hours                            1  [  ] Age > 60                                                    1  IMPROVE VTE Score ______0___
58f with history of collapsed right lung from tb with nonfunctioning bipap machine at home brought in for acute respiratory failure and emphysema reten- solumedrol increased and bipap settings changed     IMPROVE VTE Individual Risk Assessment        RISK                                                          Points  [  ] Previous VTE                                                3  [  ] Thrombophilia                                             2  [  ] Lower limb paralysis                                   2        (unable to hold up >15 seconds)    [  ] Current Cancer                                            2         (within 6 months)  [  ] Immobilization > 24 hrs                              1  [  ] ICU/CCU stay > 24 hours                            1  [  ] Age > 60                                                    1  IMPROVE VTE Score ______0___
PATIENT JADEN STEIN  07 332   1960 DOA 2019 DR CHRISTINA SIBLEY    Hospital course     58 f HO schizophremnia HO TB with ro lung collapse HO bectasis HO admission -2019 with copd ex was readmitted 2019 with dyspnea ams Pulm consulted 2019               PATIENT JADEN STEIN  07 332   1960 DOA 2019 DR CHRISTINA SIBLEY    PROBLEM/ASSESSMENT/RECOMMENDATIONS (A/R)     RESP FAILURE   2019 .32 735/70/98 3l  2019 8a bpap 18/6/.25 736/69/75   2019 2a 16/6/.3 728/80/94   2019 10a 1 734/63/289   2019 6a .21  738/60/53  A/R Avoid excess O2 Target po 90-95% 2019 8a abg acceptable   Pt refusing to wear bpap 2019 recd call from rt to dc as pt refuses   ABN CT CH   2019 cta ch   1) chr r lung volume loss and bectasis   2) Scattered calcific granulomas   3) L upper and lower lobe opacities some of which are nodular   4) R pl thickening and calcification   5) No pe    PNEUMONIA    W 10.7   2019 pc n     ct ch L upper and lower lobe opacities some of which are nodular   A/R   2019 Bact pneumonia seems unlikely Ig bc neg will deescalate abio   2019 In view of becasis and as there is past ho tb will change levaquin to zosyn and doxy pending cultures   COPD   2019 agree with bd stroids Will change pred to solumed 40   ALTERED MENTAL STATE   SCHIZOPHRENIA     TIME SPENT Over 25 minutes aggregate care time spent on encounter; activities included   direct pt care, counseling and/or coordinating care reviewing notes, lab data/ imaging , discussion with multidisciplinary team/ pt /family. Risks, benefits, alternatives  discussed in detail
PATIENT JADEN STEIN  07 332   1960 DOA 2019 DR CHRISTINA SIBLEY    Hospital course     58 f HO schizophremnia HO TB with ro lung collapse HO bectasis HO admission -2019 with copd ex was readmitted 2019 with dyspnea ams Pulm consulted 2019               PATIENT JADEN STEIN  07 332   1960 DOA 2019 DR CHRISTINA SIBLEY    PROBLEM/ASSESSMENT/RECOMMENDATIONS (A/R)     RESP FAILURE   2019 8a bpap 18/6/.25 736/69/75   2019 2a 16/6/.3 728/80/94   2019 10a 1 734/63/289   2019 6a .21  738/60/53  A/R Avoid excess O2 Target po 90-95% 2019 8a abg acceptable   ABN CT CH   2019 cta ch   1) chr r lung volume loss and bectasis   2) Scattered calcific granulomas   3) L upper and lower lobe opacities some of which are nodular   4) R pl thickening and calcification   5) No pe    PNEUMONIA    W 10.7   2019 pc n     ct ch L upper and lower lobe opacities some of which are nodular   A/R   2019 Bact pneumonia seems unlikely Ig bc neg will deescalate abio   2019 In view of becasis and as there is past ho tb will change levaquin to zosyn and doxy pending cultures   COPD   2019 agree with bd stroids Will change pred to solumed 40   ALTERED MENTAL STATE   SCHIZOPHRENIA     TIME SPENT Over 25 minutes aggregate care time spent on encounter; activities included   direct pt care, counseling and/or coordinating care reviewing notes, lab data/ imaging , discussion with multidisciplinary team/ pt /family. Risks, benefits, alternatives  discussed in detail.
PATIENT JADEN STEIN  07 332   1960 DOA 2019 DR CHRISTINA SIBLEY    Hospital course     58 f HO schizophremnia HO TB with ro lung collapse HO bectasis HO admission -2019 with copd ex was readmitted 2019 with dyspnea ams Pulm consulted 2019               PATIENT JADEN STEIN  07 332   1960 DOA 2019 DR CHRISTINA SIBLEY    PROBLEM/ASSESSMENT/RECOMMENDATIONS (A/R)     RESP FAILURE   7/15/2019 ra 739/60/59   2019 .32 735/70/98 3l  2019 8a bpap 18/6/.25 736/69/75   2019 2a 16/6/.3 728/80/94   2019 10a 1 734/63/289   2019 6a .21  738/60/53  A/R Avoid excess O2 Target po 90-95% 2019 8a abg acceptable   Pt refusing to wear bpap 2019 recd call from rt to dc as pt refuses   ABN CT CH   2019 cta ch   1) chr r lung volume loss and bectasis   2) Scattered calcific granulomas   3) L upper and lower lobe opacities some of which are nodular   4) R pl thickening and calcification   5) No pe    PNEUMONIA    W 10.7    blood culture n    urine culture n   2019 pc n     ct ch L upper and lower lobe opacities some of which are nodular   A/R   2019 Bact pneumonia seems unlikely Ig bc neg will deescalate abio   2019 In view of becasis and as there is past ho tb will change levaquin to zosyn and doxy pending cultures   2019 Changed zosyn to augmentin  COPD   2019 agree with bd stroids Will change pred to solumed 40   ALTERED MENTAL STATE   SCHIZOPHRENIA     TIME SPENT Over 25 minutes aggregate care time spent on encounter; activities included   direct pt care, counseling and/or coordinating care reviewing notes, lab data/ imaging , discussion with multidisciplinary team/ pt /family. Risks, benefits, alternatives  discussed in detail

## 2019-07-15 NOTE — DISCHARGE NOTE NURSING/CASE MANAGEMENT/SOCIAL WORK - NSDCDPATPORTLINK_GEN_ALL_CORE
You can access the [facility] Portal, offered by [facility], by registering with the following website: [email or web address]

## 2019-07-15 NOTE — PHYSICAL THERAPY INITIAL EVALUATION ADULT - DIAGNOSIS, PT EVAL
Pt presented with ability to perform bed mobility, transfers and ambulation with supervision to independent, does not need any assistive device.

## 2019-07-15 NOTE — PHYSICAL THERAPY INITIAL EVALUATION ADULT - CRITERIA FOR SKILLED THERAPEUTIC INTERVENTIONS
Does not need skilled PT intervention. Pt is on her baseline functional ability- independent /supervision, does not need any walking device

## 2019-07-15 NOTE — DISCHARGE NOTE PROVIDER - CARE PROVIDER_API CALL
primary doctor,   Phone: (   )    -  Fax: (   )    -  Follow Up Time:     Tammie Enamorado)  Medicine  [address], [address]  [place] [address]  Phone: [phone]  Fax: [phone]  Follow Up Time:

## 2019-07-15 NOTE — PHYSICAL THERAPY INITIAL EVALUATION ADULT - MANUAL MUSCLE TESTING RESULTS, REHAB EVAL
pt not follow directions in testing however approximate muscle strength both UE and LE grossly 4+/5 to 5/5 (she is independent/supervision and set up in tasks)

## 2019-07-15 NOTE — PROGRESS NOTE ADULT - SUBJECTIVE AND OBJECTIVE BOX
JADEN STEIN  07 332   1960 DOA 2019 DR CHRISTINA YORK    ALLERGY      nka   CONTACT          Other rebeca Escamilla [phone]     Initial evaluation/Pulmonary Critical Care consultation requested on  2019  by Dr York   from Dr Irvin   Patient examined chart reviewed    HOSPITAL ADMISSION   PATIENT CAME  FROM (if information available)        TYPE OF VISIT      Subsequent Pulmonary followup     REASON FOR VISIT  For list of problems evaluated/addressed, please see problem list in the note below     PATIENT JADEN STEIN  07 332   1960 DOA 2019 DR CHRISTINA YORK    VITALS/LABS     2019 97F 80 106/67 18 97%   2019 W 11.5 Hb 10.4 Plt 258  Na 141 K 4.5 CO2 36 Cr .6      PATIENT  JADEN STEIN  07 332   1960 DOA 2019 DR CHRISTINA YORK    MEDICATIONS     CAD   asa 81 ()   ABIO   levaquin ()  COPD   duoneb.4 ()   spriva ()   Pred 40 ()   PSYCH  aripiprazole 10 ()   benztropine 2.2 ()   depakote 500.2 ()   sertraline 100.2 ()   olanzapine 10.2 ()   GASTRITIS   protonix 40 ()      GLOBAL ISSUE/BEST PRACTICE:      PROBLEM: HOB elevation:   y            PROBLEM: Stress ulcer proph:    na                      PROBLEM: VTE prophylaxis:     hpsc ()  PROBLEM: Glycemic control:    na  PROBLEM: Nutrition:   reg ()   PROBLEM: Advanced directive: na     PROBLEM: Allergies:  na    REVIEW OF SYMPTOMS     NOTE Changess if any  in ROS and PE are updated in daily HOSPITAL COURSE below      Able to give ROS  Yes     RELIABLE No   CONSTITUTIONAL Weakness Yes  Chills No Vision changes No  ENDOCRINE No unexplained hair loss No heat or cold intolerance    ALLERGY No hives  Sore throat No   RESP Coughing blood no  Shortness of breath YES   NEURO No Headache  Confusion Pain neck No   CARDIAC No Chest pain No Palpitations   GI No Pain abdomen NO   Vomiting NO     PHYSICAL EXAM    HEENT Unremarkable PERRLA atraumatic   RESP Fair air entry EXP prolonged    Harsh breath sound Resp distres mild   CARDIAC S1 S2 No S3     NO JVD    ABDOMEN SOFT BS PRESENT NOT DISTENDED No hepatosplenomegaly PEDAL EDEMA present No calf tenderness  NO rash   GENERAL Not TOXIC looking
CHIEF COMPLAINT/INTERVAL HISTORY:    Patient is a 58y old  Female who presents with a chief complaint of     HPI:  59yo F w PMH of autism, schizophrenia, TB, admitted to hospital for PNA 2mos ago presents to ED for eval of SOB.  EMS report they were called for SOB state no english speakers in home, pt brought to ED.  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   ROS:  nonverbal   ICU Vital Signs Last 24 Hrs  T(C): 36.1 (2019 09:23), Max: 37.1 (2019 17:35)  T(F): 97 (2019 09:23), Max: 98.7 (2019 17:35)  HR: 70 (2019 10:25) (63 - 91)  BP: 143/84 (2019 10:25) (107/58 - 143/84)  BP(mean): --  ABP: --  ABP(mean): --  RR: 20 (2019 10:25) (14 - 20)  SpO2: 94% (2019 10:25) (93% - 100%)        MEDICATIONS  (STANDING):  ALBUTerol    90 MICROgram(s) HFA Inhaler 1 Puff(s) Inhalation every 4 hours  ALBUTerol/ipratropium for Nebulization 3 milliLiter(s) Nebulizer every 6 hours  ARIPiprazole 10 milliGRAM(s) Oral daily  aspirin enteric coated 81 milliGRAM(s) Oral daily  benztropine 2 milliGRAM(s) Oral two times a day  diVALproex  milliGRAM(s) Oral two times a day  doxycycline IVPB 100 milliGRAM(s) IV Intermittent every 12 hours  doxycycline IVPB      heparin  Injectable 5000 Unit(s) SubCutaneous every 12 hours  lactobacillus acidophilus 1 Tablet(s) Oral daily  methylPREDNISolone sodium succinate Injectable 40 milliGRAM(s) IV Push every 8 hours  OLANZapine 10 milliGRAM(s) Oral two times a day  pantoprazole  Injectable 40 milliGRAM(s) IV Push daily  piperacillin/tazobactam IVPB.. 3.375 Gram(s) IV Intermittent every 8 hours  sertraline 100 milliGRAM(s) Oral two times a day  tiotropium 18 MICROgram(s) Capsule 1 Capsule(s) Inhalation daily    MEDICATIONS  (PRN):  acetaminophen   Tablet .. 650 milliGRAM(s) Oral every 6 hours PRN Mild Pain (1 - 3)  ibuprofen  Tablet. 400 milliGRAM(s) Oral every 8 hours PRN Severe Pain (7 - 10)        PHYSICAL EXAM:    GENERAL: NAD, well-groomed, well-developed  HEAD:  Atraumatic, Normocephalic  EYES: EOMI, PERRLA, conjunctiva and sclera clear  ENMT: Moist mucous membranes  NECK: Supple, No JVD  NERVOUS SYSTEM:  Alert & Oriented X3, Motor Strength 5/5 B/L upper and lower extremities; DTRs 2+ intact and symmetric  CHEST/LUNG: Clear to auscultation bilaterally; No rales, rhonchi, wheezing, or rubs  HEART: Regular rate and rhythm; No murmurs, rubs, or gallops  ABDOMEN: Soft, Nontender, Nondistended; Bowel sounds present  EXTREMITIES:  2+ Peripheral Pulses, No clubbing, cyanosis, or edema    LABS:                        10.5   10.75 )-----------( 222      ( 2019 01:30 )             35.7     07-12    142  |  102  |  13  ----------------------------<  151<H>  4.6   |  35<H>  |  0.65    Ca    8.7      2019 01:30  Phos  4.8     07-12    TPro  7.7  /  Alb  2.8<L>  /  TBili  0.3  /  DBili  x   /  AST  9<L>  /  ALT  15  /  AlkPhos  90  07-12    PT/INR - ( 2019 01:30 )   PT: 12.9 sec;   INR: 1.15 ratio           Urinalysis Basic - ( 2019 00:36 )    Color: Yellow / Appearance: Clear / S.010 / pH: x  Gluc: x / Ketone: Trace  / Bili: Negative / Urobili: Negative mg/dL   Blood: x / Protein: 30 mg/dL / Nitrite: Negative   Leuk Esterase: Negative / RBC: 0-2 /HPF / WBC 0-2   Sq Epi: x / Non Sq Epi: Few / Bacteria: Occasional        CAPILLARY BLOOD GLUCOSE      POCT Blood Glucose.: 154 mg/dL (2019 23:42)      RECENT CULTURES:      RADIOLOGY & ADDITIONAL TESTS:  Imaging Personally Reviewed:  [ ] YES      Consultant(s) Notes Reviewed:  [ ] YES     Care Discussed with [ ] Consultants [X ] Patient [ ] Family  [x ]    [x ]  Other; RN  HEALTH ISSUES - PROBLEM Dx:  Schizophrenia, unspecified type: Schizophrenia, unspecified type  Acute respiratory failure with hypoxia and hypercapnia: Acute respiratory failure with hypoxia and hypercapnia        DVT/GI ppx  Discussed with pt @ bedside
JADEN STEIN  07 332   1960 DOA 2019 DR CHRISTINA YORK    ALLERGY      nka   CONTACT          Other rebeca Escamilla [phone]     Initial evaluation/Pulmonary Critical Care consultation requested on  2019  by Dr York   from Dr Irvin   Patient examined chart reviewed    HOSPITAL ADMISSION   PATIENT CAME  FROM (if information available)        TYPE OF VISIT      Subsequent Pulmonary followup     REASON FOR VISIT  For list of problems evaluated/addressed, please see problem list in the note below     PATIENT JADEN STEIN  07 332   1960 DOA 2019 DR CHRISTINA YORK    VITALS/LABS      2019 afeb 70 117/60   2019 W 10.7 Hb 10.5 Plt 222  Na 142 K 4.6 CO2 35 Cr .6   2019 4p 12/6/.25  2019 pc n      PATIENT  JADEN STEIN  07 332   1960 DOA 2019 DR CHRISTINA YORK    MEDICATIONS     CAD   asa 81 ()   ABIO   levaquin ()  COPD   duoneb.4 ()   spriva ()   Pred 40 ()   PSYCH  aripiprazole 10 ()   benztropine 2.2 ()   depakote 500.2 ()   sertraline 100.2 ()   olanzapine 10.2 ()   GASTRITIS   protonix 40 ()      GLOBAL ISSUE/BEST PRACTICE:      PROBLEM: HOB elevation:   y            PROBLEM: Stress ulcer proph:    na                      PROBLEM: VTE prophylaxis:     hpsc ()  PROBLEM: Glycemic control:    na  PROBLEM: Nutrition:   reg ()   PROBLEM: Advanced directive: na     PROBLEM: Allergies:  na      REVIEW OF SYMPTOMS      NOTE Changess if any  in ROS and PE are updated in daily HOSPITAL COURSE below     Able to give ROS  NO     PHYSICAL EXAM    HEENT Unremarkable PERRLA atraumatic   RESP Fair air entry EXP prolonged    Harsh breath sound Resp distres mild   CARDIAC S1 S2 No S3     NO JVD    ABDOMEN SOFT BS PRESENT NOT DISTENDED No hepatosplenomegaly PEDAL EDEMA present No calf tenderness  NO rash   GENERAL Not TOXIC
JADEN STEIN  07 332   1960 DOA 2019 DR CHRISTINA YORK    ALLERGY      nka   CONTACT          Other rebeca Escamilla [phone]     Initial evaluation/Pulmonary Critical Care consultation requested on  2019  by Dr York   from Dr Irvin   Patient examined chart reviewed    HOSPITAL ADMISSION   PATIENT CAME  FROM (if information available)        TYPE OF VISIT      Subsequent Pulmonary followup     REASON FOR VISIT  For list of problems evaluated/addressed, please see problem list in the note below     PATIENT JADEN STEIN  07 332   1960 DOA 2019 DR CHRISTINA YORK    VITALS/LABS      2019 afeb 70 117/60   2019 W 10.7 Hb 10.5 Plt 222  Na 142 K 4.6 CO2 35 Cr .6   2019 4p 12//.25  2019 pc n        PATIENT  JADEN STEIN  07 332   1960 DOA 2019 DR CHRISTINA YORK    MEDICATIONS     CAD   asa 81 ()   ABIO   levaquin ()  COPD   duoneb.4 ()   spriva ()   Pred 40 ()   PSYCH  aripiprazole 10 ()   benztropine 2.2 ()   depakote 500.2 ()   sertraline 100.2 ()   olanzapine 10.2 ()   GASTRITIS   protonix 40 ()      GLOBAL ISSUE/BEST PRACTICE:      PROBLEM: HOB elevation:   y            PROBLEM: Stress ulcer proph:    na                      PROBLEM: VTE prophylaxis:     hpsc ()  PROBLEM: Glycemic control:    na  PROBLEM: Nutrition:   reg ()   PROBLEM: Advanced directive: na     PROBLEM: Allergies:  na    REVIEW OF SYMPTOMS     NOTE Changess if any  in ROS and PE are updated in daily HOSPITAL COURSE below      Able to give ROS  Yes     RELIABLE No   CONSTITUTIONAL Weakness Yes  Chills No Vision changes No  ENDOCRINE No unexplained hair loss No heat or cold intolerance    ALLERGY No hives  Sore throat No   RESP Coughing blood no  Shortness of breath YES   NEURO No Headache  Confusion Pain neck No   CARDIAC No Chest pain No Palpitations   GI No Pain abdomen NO   Vomiting NO     PHYSICAL EXAM    HEENT Unremarkable PERRLA atraumatic   RESP Fair air entry EXP prolonged    Harsh breath sound Resp distres mild   CARDIAC S1 S2 No S3     NO JVD    ABDOMEN SOFT BS PRESENT NOT DISTENDED No hepatosplenomegaly PEDAL EDEMA present No calf tenderness  NO rash   GENERAL Not TOXIC
Patient is a 58y old  Female who presents with a chief complaint of short of breath (12 Jul 2019 12:56)      INTERVAL HPI/OVERNIGHT EVENTS:    MEDICATIONS  (STANDING):  ALBUTerol    90 MICROgram(s) HFA Inhaler 1 Puff(s) Inhalation every 4 hours  ALBUTerol/ipratropium for Nebulization 3 milliLiter(s) Nebulizer every 6 hours  ARIPiprazole 10 milliGRAM(s) Oral daily  aspirin enteric coated 81 milliGRAM(s) Oral daily  benztropine 2 milliGRAM(s) Oral two times a day  diVALproex  milliGRAM(s) Oral two times a day  doxycycline IVPB 100 milliGRAM(s) IV Intermittent every 12 hours  doxycycline IVPB      heparin  Injectable 5000 Unit(s) SubCutaneous every 12 hours  lactobacillus acidophilus 1 Tablet(s) Oral daily  methylPREDNISolone sodium succinate Injectable 40 milliGRAM(s) IV Push daily  OLANZapine 10 milliGRAM(s) Oral two times a day  pantoprazole  Injectable 40 milliGRAM(s) IV Push daily  piperacillin/tazobactam IVPB.. 3.375 Gram(s) IV Intermittent every 8 hours  sertraline 100 milliGRAM(s) Oral two times a day  tiotropium 18 MICROgram(s) Capsule 1 Capsule(s) Inhalation daily    MEDICATIONS  (PRN):  acetaminophen   Tablet .. 650 milliGRAM(s) Oral every 6 hours PRN Mild Pain (1 - 3)  ibuprofen  Tablet. 400 milliGRAM(s) Oral every 8 hours PRN Severe Pain (7 - 10)      Allergies    No Known Allergies    Intolerances          Vital Signs Last 24 Hrs  T(C): 36.2 (14 Jul 2019 17:02), Max: 36.6 (14 Jul 2019 00:10)  T(F): 97.1 (14 Jul 2019 17:02), Max: 97.8 (14 Jul 2019 00:10)  HR: 89 (14 Jul 2019 17:14) (66 - 94)  BP: 106/67 (14 Jul 2019 17:02) (98/62 - 134/71)  BP(mean): --  RR: 18 (14 Jul 2019 17:02) (18 - 18)  SpO2: 96% (14 Jul 2019 17:14) (91% - 99%)    PHYSICAL EXAM:  GENERAL:   HEAD:    EYES:   ENMT:   NECK:   NERVOUS SYSTEM:    CHEST/LUNG:   HEART:   ABDOMEN:   EXTREMITIES:    LYMPH:   SKIN:     LABS:                        10.4   11.56 )-----------( 258      ( 13 Jul 2019 06:21 )             35.2     07-13    141  |  100  |  21  ----------------------------<  138<H>  4.5   |  36<H>  |  0.61    Ca    8.5      13 Jul 2019 06:21  Phos  2.9     07-13    TPro  7.2  /  Alb  2.5<L>  /  TBili  0.3  /  DBili  x   /  AST  15  /  ALT  14  /  AlkPhos  80  07-13    PT/INR - ( 13 Jul 2019 06:21 )   PT: 12.7 sec;   INR: 1.13 ratio             CAPILLARY BLOOD GLUCOSE          Culture - Urine (collected 12 Jul 2019 09:34)  Source: .Urine  Final Report (13 Jul 2019 11:03):    No growth    Culture - Blood (collected 12 Jul 2019 09:13)  Source: .Blood  Preliminary Report (13 Jul 2019 10:01):    No growth to date.    Culture - Blood (collected 11 Jul 2019 16:20)  Source: .Blood  Preliminary Report (12 Jul 2019 17:01):    No growth to date.    Culture - Blood (collected 11 Jul 2019 16:20)  Source: .Blood  Preliminary Report (12 Jul 2019 17:01):    No growth to date.      RADIOLOGY & ADDITIONAL TESTS:    Imaging Personally Reviewed:  [ ] YES  [ ] NO    Consultant(s) Notes Reviewed:  [ ] YES  [ ] NO    Care Discussed with Consultants/Other Providers [ ] YES  [ ] NO
Patient is a 58y old  Female who presents with a chief complaint of short of breath (12 Jul 2019 12:56)      INTERVAL HPI/OVERNIGHT EVENTS:    MEDICATIONS  (STANDING):  ALBUTerol    90 MICROgram(s) HFA Inhaler 1 Puff(s) Inhalation every 4 hours  ALBUTerol/ipratropium for Nebulization 3 milliLiter(s) Nebulizer every 6 hours  ARIPiprazole 10 milliGRAM(s) Oral daily  aspirin enteric coated 81 milliGRAM(s) Oral daily  benztropine 2 milliGRAM(s) Oral two times a day  diVALproex  milliGRAM(s) Oral two times a day  doxycycline IVPB 100 milliGRAM(s) IV Intermittent every 12 hours  doxycycline IVPB      heparin  Injectable 5000 Unit(s) SubCutaneous every 12 hours  lactobacillus acidophilus 1 Tablet(s) Oral daily  methylPREDNISolone sodium succinate Injectable 40 milliGRAM(s) IV Push daily  OLANZapine 10 milliGRAM(s) Oral two times a day  pantoprazole  Injectable 40 milliGRAM(s) IV Push daily  piperacillin/tazobactam IVPB.. 3.375 Gram(s) IV Intermittent every 8 hours  sertraline 100 milliGRAM(s) Oral two times a day  tiotropium 18 MICROgram(s) Capsule 1 Capsule(s) Inhalation daily    MEDICATIONS  (PRN):  acetaminophen   Tablet .. 650 milliGRAM(s) Oral every 6 hours PRN Mild Pain (1 - 3)  ibuprofen  Tablet. 400 milliGRAM(s) Oral every 8 hours PRN Severe Pain (7 - 10)      Allergies    No Known Allergies    Intolerances          Vital Signs Last 24 Hrs  T(C): 36.2 (14 Jul 2019 17:02), Max: 36.6 (14 Jul 2019 00:10)  T(F): 97.1 (14 Jul 2019 17:02), Max: 97.8 (14 Jul 2019 00:10)  HR: 89 (14 Jul 2019 17:14) (66 - 94)  BP: 106/67 (14 Jul 2019 17:02) (98/62 - 134/71)  BP(mean): --  RR: 18 (14 Jul 2019 17:02) (18 - 18)  SpO2: 96% (14 Jul 2019 17:14) (91% - 99%)    PHYSICAL EXAM:  GENERAL:   HEAD:    EYES:   ENMT:   NECK:   NERVOUS SYSTEM:    CHEST/LUNG:   HEART:   ABDOMEN:   EXTREMITIES:    LYMPH:   SKIN:     LABS:                        10.4   11.56 )-----------( 258      ( 13 Jul 2019 06:21 )             35.2     07-13    141  |  100  |  21  ----------------------------<  138<H>  4.5   |  36<H>  |  0.61    Ca    8.5      13 Jul 2019 06:21  Phos  2.9     07-13    TPro  7.2  /  Alb  2.5<L>  /  TBili  0.3  /  DBili  x   /  AST  15  /  ALT  14  /  AlkPhos  80  07-13    PT/INR - ( 13 Jul 2019 06:21 )   PT: 12.7 sec;   INR: 1.13 ratio             CAPILLARY BLOOD GLUCOSE          Culture - Urine (collected 12 Jul 2019 09:34)  Source: .Urine  Final Report (13 Jul 2019 11:03):    No growth    Culture - Blood (collected 12 Jul 2019 09:13)  Source: .Blood  Preliminary Report (13 Jul 2019 10:01):    No growth to date.    Culture - Blood (collected 11 Jul 2019 16:20)  Source: .Blood  Preliminary Report (12 Jul 2019 17:01):    No growth to date.    Culture - Blood (collected 11 Jul 2019 16:20)  Source: .Blood  Preliminary Report (12 Jul 2019 17:01):    No growth to date.      RADIOLOGY & ADDITIONAL TESTS:    Imaging Personally Reviewed:  [ ] YES  [ ] NO    Consultant(s) Notes Reviewed:  [ ] YES  [ ] NO    Care Discussed with Consultants/Other Providers [ ] YES  [ ] NO
Preliminary note based on available patient data was entered below in order to expedite patient management  Patient will be examined within the next few hours (and this note will be edited if so dictated by the then latest available data) Please note that  by the end of this day the below note should be considered as my final patient progress note for today     JADEN STEIN  07 332   1960 DOA 2019 DR CHRISTINA YORK    ALLERGY      nka   CONTACT          Other rebeca Escamilla [phone]     Initial evaluation/Pulmonary Critical Care consultation requested on  2019  by Dr York   from Dr Irvin   Patient examined chart reviewed    HOSPITAL ADMISSION   PATIENT CAME  FROM (if information available)        TYPE OF VISIT      Subsequent Pulmonary followup     REASON FOR VISIT  For list of problems evaluated/addressed, please see problem list in the note below     PATIENT JADEN STEIN  07 332   1960 DOA 2019 DR CHRISTINA YORK    VITALS/LABS     7/15/2019 afeb 99 116/57 18 95%   7/15/2019 W 12.5 Hb 10.6 Plt 296 INR 1.1 Na 143 K 3.8 CO2 36 Cr .7       PATIENT  JADEN STEIN  07 332   1960 DOA 2019 DR CHRISTINA YORK    MEDICATIONS     CAD   asa 81 ()   ABIO   levaquin ()  COPD   duoneb.4 ()   spriva ()   Pred 40 ()   PSYCH  aripiprazole 10 ()   benztropine 2.2 ()   depakote 500.2 ()   sertraline 100.2 ()   olanzapine 10.2 ()   GASTRITIS   protonix 40 ()      GLOBAL ISSUE/BEST PRACTICE:      PROBLEM: HOB elevation:   y            PROBLEM: Stress ulcer proph:    na                      PROBLEM: VTE prophylaxis:     hpsc ()  PROBLEM: Glycemic control:    na  PROBLEM: Nutrition:   reg ()   PROBLEM: Advanced directive: na     PROBLEM: Allergies:  na

## 2019-07-15 NOTE — DISCHARGE NOTE PROVIDER - NSDCCPCAREPLAN_GEN_ALL_CORE_FT
PRINCIPAL DISCHARGE DIAGNOSIS  Diagnosis: Acute respiratory failure with hypoxia and hypercapnia  Assessment and Plan of Treatment: Found to have CO2 retention. pt refusing BIPAP use. has O2 at home. She was treated for possible PNA. stable to go home. follow up with primary pulmonologist.      SECONDARY DISCHARGE DIAGNOSES  Diagnosis: Hypercapnia  Assessment and Plan of Treatment: Refusing BIPAP    Diagnosis: Schizophrenia, unspecified type  Assessment and Plan of Treatment: Home med    Diagnosis: Hypoxia  Assessment and Plan of Treatment: resolved, has home O2.

## 2019-07-17 LAB
CULTURE RESULTS: SIGNIFICANT CHANGE UP
S PNEUM AG SER QL: SIGNIFICANT CHANGE UP
SPECIMEN SOURCE: SIGNIFICANT CHANGE UP

## 2019-07-24 DIAGNOSIS — J96.02 ACUTE RESPIRATORY FAILURE WITH HYPERCAPNIA: ICD-10-CM

## 2019-07-24 DIAGNOSIS — J96.01 ACUTE RESPIRATORY FAILURE WITH HYPOXIA: ICD-10-CM

## 2019-07-24 DIAGNOSIS — Z86.11 PERSONAL HISTORY OF TUBERCULOSIS: ICD-10-CM

## 2019-07-24 DIAGNOSIS — J18.9 PNEUMONIA, UNSPECIFIED ORGANISM: ICD-10-CM

## 2019-07-24 DIAGNOSIS — F20.9 SCHIZOPHRENIA, UNSPECIFIED: ICD-10-CM

## 2019-07-24 DIAGNOSIS — I25.10 ATHEROSCLEROTIC HEART DISEASE OF NATIVE CORONARY ARTERY WITHOUT ANGINA PECTORIS: ICD-10-CM

## 2019-07-24 DIAGNOSIS — F84.0 AUTISTIC DISORDER: ICD-10-CM

## 2019-07-24 DIAGNOSIS — J43.9 EMPHYSEMA, UNSPECIFIED: ICD-10-CM

## 2019-07-24 DIAGNOSIS — K29.70 GASTRITIS, UNSPECIFIED, WITHOUT BLEEDING: ICD-10-CM

## 2019-08-14 NOTE — PROGRESS NOTE ADULT - PROBLEM SELECTOR PLAN 1
no new episodes of ams and tolerating bipap well at night.  yesterday pt  saturating at 91 % on RA and 88 with exertion,. likely a candidate for home o2. will repeat sats today   remains afebrile.  pulmonary wants bipap on dc. no new episodes of ams and tolerating bipap well at night.  yesterday pt  saturating at 91 % on RA and 88 with exertion,.   remains afebrile.  pulmonary wants bipap on dc.  pt was admitted to hospital with pco2 of 79. Bipap was tried and not successful to keep the co2 levels down and pt continued to have episodes of resp failure. Ruling out biopap ST due to pts progressive nature of dz. trilogy offers volume augmented ventilation not offered in other PAP machines and is warranted None known

## 2020-07-20 NOTE — DISCHARGE NOTE NURSING/CASE MANAGEMENT/SOCIAL WORK - NSDCPEFALRISK_GEN_ALL_CORE
History of hyperlipidemia  Patient admits that she is not watching her diet closely, intake of fats and cholesterol  The she was told that this is extremely important especially with her being at high risk for not only stenosis to carotid arteries but also coronary artery disease  Again we also stressed the importance of quitting smoking    We will check a lipid profile with her next visit Patient information on fall and injury prevention

## 2021-08-19 NOTE — ED ADULT NURSE NOTE - NEURO ASSESSMENT
Next appt 11-30-21  Last appt 5-27-21    Refill request for/Last refilled info;  acetaminophen-codeine (TYLENOL NO.3) 300-30 MG per tablet 30 tablet 0 8/3/2021     Sig - Route: Take 1 tablet by mouth 3 times daily as needed for pain - Oral      Refill unable to be completed per standing protocol due to; Non protocol med    Orders pended, and routed to provider for approval.   Please route any notes back to your nursing pool via patient call NOT Rx Auth.  Thank you, Refill Center Staff      
WDL

## 2023-01-16 NOTE — DIETITIAN INITIAL EVALUATION ADULT. - WEIGHT IN LBS
171 120.1 Zoryve Pregnancy And Lactation Text: It is unknown if this medication can cause problems during pregnancy and breastfeeding.

## 2023-07-10 NOTE — ED ADULT NURSE NOTE - BREATHING, MLM
Spontaneous, unlabored and symmetrical Azithromycin Pregnancy And Lactation Text: This medication is considered safe during pregnancy and is also secreted in breast milk.

## 2023-07-16 NOTE — ED ADULT TRIAGE NOTE - PAIN RATING/NUMBER SCALE (0-10): ACTIVITY
Patient presents with diarrhea and chest pain. non tender abdomen. labs, ekg, cxr done. troponin negative x 2. Results discussed. understands to follow up with her cardiologist. return precautions discussed. 0

## 2023-08-02 NOTE — PATIENT PROFILE ADULT - NSPROHMSYMPCOND_GEN_A_NUR
Refill request received for Meloxicam at 90 day supply with [facility]    Last office visit: 5/15/2023  Next office visit: 8/21/2023  Last refill: 6/7/2023 #30 X 1  Last labs: 4/16/2023
respiratory/gastrointestinal

## 2024-01-12 NOTE — DISCHARGE NOTE NURSING/CASE MANAGEMENT/SOCIAL WORK - MODE OF TRANSPORTATION
Additional Notes: 1/12/23 2014,2004  in [place] Detail Level: Simple Render Risk Assessment In Note?: yes [facility]

## 2025-06-14 NOTE — PATIENT PROFILE ADULT - OVER THE PAST TWO WEEKS, HAVE YOU FELT LITTLE INTEREST OR PLEASURE IN DOING THINGS?
Emergency Department Provider Note        History of Present Illness     History provided by: Patient  External Records Reviewed with Brief Summary: Care Everywhere visit at [facility] on 5/2/2025 which showed visit to establish mental health care, reviewed for medical history    HPI:  Dahiana Harris is a 36 y.o. female with PMHx paranoid schizophrenia on Haldol decanoate IM injections (due 6/18/25), bipolar disorder, delusions of pregnancy, who presents to the emergency department via [employer].  Per PD report, the patient presented to the police station today with her young nephew and stated to PD that he looks like her missing son.  Patient is persistent in her belief that her nephew may be her missing son.    On arrival, patient reports some lower abdominal/suprapubic discomfort.  No fever/chills.  No nausea/vomiting.  She has been having daily soft bowel movements. She denies any trauma, falls, or injuries. No headache, dizziness, vision changes, neck pain, back pain, chest pain, shortness of breath, diarrhea, constipation, vaginal discharge, vaginal bleeding, urinary symptoms, numbness, or tingling. Patient denies any suicidal or homicidal ideation. Patient denies any auditory or visual hallucinations. Patient reports that she has been compliant with her home medications.     Previous outpatient note from 5/2/2025 describes that patient has a 27-year-old daughter and reported having a 21-year-old son, who she stated was kidnapped and is now 6 years old, patient noted to have persistent delusions regarding pregnancy.  Outpatient OB note from 6/6/25 notes that patient has 1 son who lives with his grandmother.    Physical Exam   Triage vitals:  T 35.9 °C (96.7 °F)  HR 88  /67  RR 16  O2 96 % None (Room air)    Physical exam:   Triage vitals reviewed.  Constitutional: Well developed adult in no acute distress, non toxic in appearance  Head: Normocephalic, atraumatic  Skin: Intact, dry. No rashes or  lesions.  Eyes: Pupils are equal. No conjunctival injection. EOMI.   Mouth: mucous membranes moist  Neck: Supple. Trachea is midline. Full ROM intact.   Pulmonary: Normal work of breathing with no accessory muscle use noted.  Clear to auscultation bilaterally.   Cardiovascular: Normal rate, regular rhythm. No murmurs/gallops/rubs appreciated. 2+ radial pulses bilaterally.   Abdomen: Soft, nondistended. Nontender to palpation. No guarding or rebound. No peritoneal signs. Normal bowel sounds.   Extremities: No gross deformities.  Moving all extremities spontaneously without difficulty. No edema.   Neuro: Awake and alert. Face is symmetric. Speech is clear. No obvious focal findings. Cranial nerves 2-12 intact. Strength 5/5 in upper and lower extremities bilaterally.   Psych: Calm, cooperative.  Denies suicidal or homicidal ideation. Denies auditory or visual hallucinations.  Does not appear to be attending to internal stimuli.  She does persist in her delusion that the nephew she brought to the police station is in fact her son who she believes to be missing.    Medical Decision Making & ED Course   Medical Decision Makin y.o. female with PMHx paranoid schizophrenia, bipolar disorder who presents to the emergency department via PD for persistent delusion.  On arrival she was afebrile and hemodynamically stable, saturating well on room air.  She did report some suprapubic abdominal discomfort and UA/urine pregnancy test were obtained and were both negative. Patient denies any vaginal discharge or vaginal bleeding but did agree to STD testing and elected to self swab. She declined any empiric treatment for any possible STD. Wet prep was negative for bacterial vaginosis, yeast, or trichomonas. CBC showed no leukocytosis or anemia. CMP showed creatinine of 1.12 but was otherwise unremarkable. Urine drug screen was positive for marijuana. Acute tox panel was negative. Her abdomen is soft, nontender, non-peritonitic.  Her suprapubic discomfort was completely resolved after warm pack was applied in the ED.     Patient's behavior is concerning as it represents a danger to the community.  Her stated reason for taking her nephew and going to the police department is that he looks like her missing son, and I do have concern that this could occur with another child who is not a family member. In the ED, elopement precautions ordered and EPAT was consulted for patient evaluation.  Patient was assessed in the ED by psychiatry fellow Dr. Vitaly Hodge who recommended admission for inpatient psychiatric care.  Family at bedside with patient and was in agreement with inpatient psychiatric admission for medication adjustment. However, when the psychiatry fellow Dr. Vitaly Hodge was telling the patient that she was going to need admission for inpatient psychiatric care, the patient reportedly did not want to be admitted.  At that time the patient got up from the bed and began to quickly walk out of the emergency department.  PD was immediately contacted for assistance. Attempts were made by the ED team and Dr. Hodge to keep the patient in the ED but the patient was unable to be verbally redirected and  PD had not yet arrived when the patient eloped from the ED and ran away.  PD were unable to stop the patient from leaving.  PD attempted to find and bring the patient back but they were unable to locate the patient. Patient's family was made aware of the importance of attempting to bring the patient back to the ED for continued care and psychiatric placement. Patient eloped from the ED.     ----    Differential diagnoses considered include but are not limited to: Paranoid schizophrenia, decompensated psychiatric illness, metabolic derangement, UTI     Social Determinants of Health which Significantly Impact Care: Mental health disorder The following actions were taken to address these social determinants: Patient evaluated by EPAT      EKG Independent Interpretation: EKG interpreted by myself. Please see ED Course for full interpretation.    Independent Result Review and Interpretation: Relevant laboratory and radiographic results were reviewed and independently interpreted by myself.  As necessary, they are commented on in the ED Course.    Chronic conditions affecting the patient's care: As documented above in [facility]    The patient was discussed with the following consultants/services: EPAT regarding delusions w/ dangerous behavior    Care Considerations: As documented above in [facility]    ED Course:  ED Course as of 06/15/25 0024   Sat Jun 14, 2025   1402 Preg Test, Ur: Negative [HH]   1446 Electrocardiogram, 12-lead  EKG obtained 1419 and interpreted by me: Sinus bradycardia with rate of 52 bpm, normal axis, normal intervals, no QTc prolongation.  No ischemic pattern.  Compared with prior EKG on 6/13/2021, there is no significant change. [HH]   1657 Patient was informed by psychiatry fellow that she would be admitted for inpatient psychiatric care.  At that time patient got up and began to walk out of the emergency department, we were unable to verbally redirect her.   PD were called but had not arrived at the time when patient walked out the front door and ran away. [HH]      ED Course User Index  [HH] Rachna Vega MD         Diagnoses as of 06/15/25 0024   Paranoid schizophrenia (Multi)     Disposition   Eloped    Patient seen and discussed with ED attending physician.    Rachna Vega MD  Emergency Medicine     Rachna Vega MD  Resident  06/15/25 0023       Rachna Vega MD  Resident  06/15/25 0024       Rosaura Hays MD  06/15/25 0600     no
